# Patient Record
Sex: MALE | Race: WHITE | Employment: FULL TIME | URBAN - NONMETROPOLITAN AREA
[De-identification: names, ages, dates, MRNs, and addresses within clinical notes are randomized per-mention and may not be internally consistent; named-entity substitution may affect disease eponyms.]

---

## 2017-03-23 ENCOUNTER — APPOINTMENT (OUTPATIENT)
Dept: GENERAL RADIOLOGY | Age: 42
DRG: 190 | End: 2017-03-23
Payer: COMMERCIAL

## 2017-03-23 ENCOUNTER — HOSPITAL ENCOUNTER (INPATIENT)
Age: 42
LOS: 4 days | Discharge: HOME | DRG: 190 | End: 2017-03-27
Attending: SPECIALIST | Admitting: INTERNAL MEDICINE
Payer: COMMERCIAL

## 2017-03-23 ENCOUNTER — OFFICE VISIT (OUTPATIENT)
Dept: PRIMARY CARE CLINIC | Age: 42
End: 2017-03-23

## 2017-03-23 DIAGNOSIS — J44.1 ACUTE EXACERBATION OF CHRONIC OBSTRUCTIVE PULMONARY DISEASE (COPD) (HCC): Primary | ICD-10-CM

## 2017-03-23 DIAGNOSIS — R06.02 SHORTNESS OF BREATH: Primary | ICD-10-CM

## 2017-03-23 LAB
ABSOLUTE EOS #: 0.15 K/UL (ref 0–0.4)
ABSOLUTE LYMPH #: 1.53 K/UL (ref 1–4.8)
ABSOLUTE MONO #: 1.24 K/UL (ref 0.1–1.2)
ANION GAP SERPL CALCULATED.3IONS-SCNC: 9 MMOL/L (ref 9–17)
BASOPHILS # BLD: 1 % (ref 0–2)
BASOPHILS ABSOLUTE: 0.07 K/UL (ref 0–0.2)
BNP INTERPRETATION: NORMAL
BUN BLDV-MCNC: 12 MG/DL (ref 6–20)
BUN/CREAT BLD: 15 (ref 9–20)
CALCIUM SERPL-MCNC: 9 MG/DL (ref 8.6–10.4)
CHLORIDE BLD-SCNC: 100 MMOL/L (ref 98–107)
CO2: 34 MMOL/L (ref 20–31)
CREAT SERPL-MCNC: 0.81 MG/DL (ref 0.7–1.2)
D DIMER: 428 NG/ML
DIFFERENTIAL TYPE: ABNORMAL
EKG ATRIAL RATE: 105 BPM
EKG P AXIS: 34 DEGREES
EKG P-R INTERVAL: 166 MS
EKG Q-T INTERVAL: 344 MS
EKG QRS DURATION: 80 MS
EKG QTC CALCULATION (BAZETT): 454 MS
EKG R AXIS: -4 DEGREES
EKG T AXIS: 17 DEGREES
EKG VENTRICULAR RATE: 105 BPM
EOSINOPHILS RELATIVE PERCENT: 2 % (ref 1–4)
GFR AFRICAN AMERICAN: >60 ML/MIN
GFR NON-AFRICAN AMERICAN: >60 ML/MIN
GFR SERPL CREATININE-BSD FRML MDRD: ABNORMAL ML/MIN/{1.73_M2}
GFR SERPL CREATININE-BSD FRML MDRD: ABNORMAL ML/MIN/{1.73_M2}
GLUCOSE BLD-MCNC: 127 MG/DL (ref 70–99)
GLUCOSE BLD-MCNC: 243 MG/DL (ref 75–110)
HCT VFR BLD CALC: 47.3 % (ref 41–53)
HEMOGLOBIN: 14.9 G/DL (ref 13.5–17.5)
LYMPHOCYTES # BLD: 21 % (ref 24–44)
MAGNESIUM: 1.9 MG/DL (ref 1.6–2.6)
MCH RBC QN AUTO: 29.8 PG (ref 26–34)
MCHC RBC AUTO-ENTMCNC: 31.6 G/DL (ref 31–37)
MCV RBC AUTO: 94.5 FL (ref 80–100)
MONOCYTES # BLD: 17 % (ref 1–7)
MORPHOLOGY: ABNORMAL
PDW BLD-RTO: 14.9 % (ref 11–14.5)
PLATELET # BLD: 236 K/UL (ref 140–450)
PLATELET ESTIMATE: ABNORMAL
PMV BLD AUTO: 9.3 FL (ref 6–12)
POTASSIUM SERPL-SCNC: 4.2 MMOL/L (ref 3.7–5.3)
PRO-BNP: 56 PG/ML
RBC # BLD: 5 M/UL (ref 4.5–5.9)
RBC # BLD: ABNORMAL 10*6/UL
SEG NEUTROPHILS: 59 % (ref 36–66)
SEGMENTED NEUTROPHILS ABSOLUTE COUNT: 4.31 K/UL (ref 1.8–7.7)
SODIUM BLD-SCNC: 143 MMOL/L (ref 135–144)
TROPONIN INTERP: NORMAL
TROPONIN T: <0.03 NG/ML
WBC # BLD: 7.3 K/UL (ref 3.5–11)
WBC # BLD: ABNORMAL 10*3/UL

## 2017-03-23 PROCEDURE — 85025 COMPLETE CBC W/AUTO DIFF WBC: CPT

## 2017-03-23 PROCEDURE — 6370000000 HC RX 637 (ALT 250 FOR IP): Performed by: NURSE PRACTITIONER

## 2017-03-23 PROCEDURE — 71020 XR CHEST STANDARD TWO VW: CPT

## 2017-03-23 PROCEDURE — 99999 PR OFFICE/OUTPT VISIT,PROCEDURE ONLY: CPT | Performed by: PHYSICIAN ASSISTANT

## 2017-03-23 PROCEDURE — 6360000002 HC RX W HCPCS: Performed by: SPECIALIST

## 2017-03-23 PROCEDURE — 94761 N-INVAS EAR/PLS OXIMETRY MLT: CPT

## 2017-03-23 PROCEDURE — 6360000002 HC RX W HCPCS: Performed by: NURSE PRACTITIONER

## 2017-03-23 PROCEDURE — 36415 COLL VENOUS BLD VENIPUNCTURE: CPT

## 2017-03-23 PROCEDURE — 94640 AIRWAY INHALATION TREATMENT: CPT

## 2017-03-23 PROCEDURE — 84484 ASSAY OF TROPONIN QUANT: CPT

## 2017-03-23 PROCEDURE — 83735 ASSAY OF MAGNESIUM: CPT

## 2017-03-23 PROCEDURE — 6360000002 HC RX W HCPCS: Performed by: INTERNAL MEDICINE

## 2017-03-23 PROCEDURE — 96374 THER/PROPH/DIAG INJ IV PUSH: CPT

## 2017-03-23 PROCEDURE — 71020 XR CHEST STANDARD TWO VW: CPT | Performed by: RADIOLOGY

## 2017-03-23 PROCEDURE — 2580000003 HC RX 258: Performed by: INTERNAL MEDICINE

## 2017-03-23 PROCEDURE — 80048 BASIC METABOLIC PNL TOTAL CA: CPT

## 2017-03-23 PROCEDURE — 94150 VITAL CAPACITY TEST: CPT

## 2017-03-23 PROCEDURE — 83880 ASSAY OF NATRIURETIC PEPTIDE: CPT

## 2017-03-23 PROCEDURE — 2060000000 HC ICU INTERMEDIATE R&B

## 2017-03-23 PROCEDURE — 82947 ASSAY GLUCOSE BLOOD QUANT: CPT

## 2017-03-23 PROCEDURE — 93005 ELECTROCARDIOGRAM TRACING: CPT

## 2017-03-23 PROCEDURE — 85379 FIBRIN DEGRADATION QUANT: CPT

## 2017-03-23 PROCEDURE — 2500000003 HC RX 250 WO HCPCS: Performed by: NURSE PRACTITIONER

## 2017-03-23 PROCEDURE — 6370000000 HC RX 637 (ALT 250 FOR IP): Performed by: SPECIALIST

## 2017-03-23 PROCEDURE — 99285 EMERGENCY DEPT VISIT HI MDM: CPT

## 2017-03-23 RX ORDER — DEXTROSE MONOHYDRATE 25 G/50ML
12.5 INJECTION, SOLUTION INTRAVENOUS PRN
Status: DISCONTINUED | OUTPATIENT
Start: 2017-03-23 | End: 2017-03-27 | Stop reason: HOSPADM

## 2017-03-23 RX ORDER — DEXTROSE MONOHYDRATE 50 MG/ML
100 INJECTION, SOLUTION INTRAVENOUS PRN
Status: DISCONTINUED | OUTPATIENT
Start: 2017-03-23 | End: 2017-03-27 | Stop reason: HOSPADM

## 2017-03-23 RX ORDER — CEPHALEXIN 500 MG/1
500 CAPSULE ORAL 2 TIMES DAILY
Status: ON HOLD | COMMUNITY
End: 2017-03-27 | Stop reason: HOSPADM

## 2017-03-23 RX ORDER — ALBUTEROL SULFATE 90 UG/1
2 AEROSOL, METERED RESPIRATORY (INHALATION) EVERY 6 HOURS PRN
Status: DISCONTINUED | OUTPATIENT
Start: 2017-03-23 | End: 2017-03-23

## 2017-03-23 RX ORDER — IPRATROPIUM BROMIDE AND ALBUTEROL SULFATE 2.5; .5 MG/3ML; MG/3ML
1 SOLUTION RESPIRATORY (INHALATION)
Status: DISCONTINUED | OUTPATIENT
Start: 2017-03-23 | End: 2017-03-23

## 2017-03-23 RX ORDER — METHYLPREDNISOLONE SODIUM SUCCINATE 125 MG/2ML
125 INJECTION, POWDER, LYOPHILIZED, FOR SOLUTION INTRAMUSCULAR; INTRAVENOUS EVERY 6 HOURS
Status: DISCONTINUED | OUTPATIENT
Start: 2017-03-23 | End: 2017-03-25

## 2017-03-23 RX ORDER — PANTOPRAZOLE SODIUM 40 MG/1
40 TABLET, DELAYED RELEASE ORAL DAILY
Status: DISCONTINUED | OUTPATIENT
Start: 2017-03-24 | End: 2017-03-27 | Stop reason: HOSPADM

## 2017-03-23 RX ORDER — NICOTINE POLACRILEX 4 MG
15 LOZENGE BUCCAL PRN
Status: DISCONTINUED | OUTPATIENT
Start: 2017-03-23 | End: 2017-03-27 | Stop reason: HOSPADM

## 2017-03-23 RX ORDER — FLUTICASONE PROPIONATE 50 MCG
1 SPRAY, SUSPENSION (ML) NASAL DAILY
Status: DISCONTINUED | OUTPATIENT
Start: 2017-03-24 | End: 2017-03-27 | Stop reason: HOSPADM

## 2017-03-23 RX ORDER — ASPIRIN 81 MG/1
81 TABLET ORAL DAILY
Status: DISCONTINUED | OUTPATIENT
Start: 2017-03-24 | End: 2017-03-27 | Stop reason: HOSPADM

## 2017-03-23 RX ORDER — CEPHALEXIN 250 MG/1
500 CAPSULE ORAL 2 TIMES DAILY
Status: DISCONTINUED | OUTPATIENT
Start: 2017-03-23 | End: 2017-03-26

## 2017-03-23 RX ORDER — SODIUM CHLORIDE FOR INHALATION 0.9 %
3 VIAL, NEBULIZER (ML) INHALATION
Status: DISCONTINUED | OUTPATIENT
Start: 2017-03-23 | End: 2017-03-23

## 2017-03-23 RX ORDER — ONDANSETRON 2 MG/ML
4 INJECTION INTRAMUSCULAR; INTRAVENOUS EVERY 6 HOURS PRN
Status: DISCONTINUED | OUTPATIENT
Start: 2017-03-23 | End: 2017-03-27 | Stop reason: HOSPADM

## 2017-03-23 RX ORDER — IPRATROPIUM BROMIDE AND ALBUTEROL SULFATE 2.5; .5 MG/3ML; MG/3ML
1 SOLUTION RESPIRATORY (INHALATION) EVERY 4 HOURS
Status: DISCONTINUED | OUTPATIENT
Start: 2017-03-23 | End: 2017-03-27 | Stop reason: HOSPADM

## 2017-03-23 RX ORDER — ALBUTEROL SULFATE 2.5 MG/3ML
2.5 SOLUTION RESPIRATORY (INHALATION)
Status: DISCONTINUED | OUTPATIENT
Start: 2017-03-23 | End: 2017-03-27 | Stop reason: HOSPADM

## 2017-03-23 RX ORDER — METHYLPREDNISOLONE SODIUM SUCCINATE 125 MG/2ML
125 INJECTION, POWDER, LYOPHILIZED, FOR SOLUTION INTRAMUSCULAR; INTRAVENOUS ONCE
Status: COMPLETED | OUTPATIENT
Start: 2017-03-23 | End: 2017-03-23

## 2017-03-23 RX ORDER — SODIUM CHLORIDE 0.9 % (FLUSH) 0.9 %
10 SYRINGE (ML) INJECTION EVERY 12 HOURS SCHEDULED
Status: DISCONTINUED | OUTPATIENT
Start: 2017-03-23 | End: 2017-03-27 | Stop reason: HOSPADM

## 2017-03-23 RX ORDER — DEXTROSE MONOHYDRATE 50 MG/ML
INJECTION, SOLUTION INTRAVENOUS
Status: DISPENSED
Start: 2017-03-23 | End: 2017-03-24

## 2017-03-23 RX ORDER — ACETAMINOPHEN 325 MG/1
650 TABLET ORAL EVERY 4 HOURS PRN
Status: DISCONTINUED | OUTPATIENT
Start: 2017-03-23 | End: 2017-03-27 | Stop reason: HOSPADM

## 2017-03-23 RX ORDER — LISINOPRIL 5 MG/1
5 TABLET ORAL DAILY
Status: DISCONTINUED | OUTPATIENT
Start: 2017-03-24 | End: 2017-03-27 | Stop reason: HOSPADM

## 2017-03-23 RX ORDER — SODIUM CHLORIDE 0.9 % (FLUSH) 0.9 %
10 SYRINGE (ML) INJECTION PRN
Status: DISCONTINUED | OUTPATIENT
Start: 2017-03-23 | End: 2017-03-27 | Stop reason: HOSPADM

## 2017-03-23 RX ADMIN — AZITHROMYCIN MONOHYDRATE 500 MG: 500 INJECTION, POWDER, LYOPHILIZED, FOR SOLUTION INTRAVENOUS at 19:23

## 2017-03-23 RX ADMIN — INSULIN LISPRO 1 UNITS: 100 INJECTION, SOLUTION INTRAVENOUS; SUBCUTANEOUS at 21:19

## 2017-03-23 RX ADMIN — METHYLPREDNISOLONE SODIUM SUCCINATE 125 MG: 125 INJECTION, POWDER, FOR SOLUTION INTRAMUSCULAR; INTRAVENOUS at 15:52

## 2017-03-23 RX ADMIN — IPRATROPIUM BROMIDE AND ALBUTEROL SULFATE 1 AMPULE: .5; 3 SOLUTION RESPIRATORY (INHALATION) at 16:51

## 2017-03-23 RX ADMIN — CEFTRIAXONE 1 G: 1 INJECTION, POWDER, FOR SOLUTION INTRAMUSCULAR; INTRAVENOUS at 18:55

## 2017-03-23 RX ADMIN — METOPROLOL TARTRATE 25 MG: 25 TABLET ORAL at 21:19

## 2017-03-23 RX ADMIN — IPRATROPIUM BROMIDE AND ALBUTEROL SULFATE 1 AMPULE: .5; 3 SOLUTION RESPIRATORY (INHALATION) at 20:02

## 2017-03-23 RX ADMIN — METHYLPREDNISOLONE SODIUM SUCCINATE 125 MG: 125 INJECTION, POWDER, FOR SOLUTION INTRAMUSCULAR; INTRAVENOUS at 21:19

## 2017-03-23 RX ADMIN — CEPHALEXIN 500 MG: 250 CAPSULE ORAL at 21:19

## 2017-03-23 RX ADMIN — METOPROLOL TARTRATE 5 MG: 5 INJECTION INTRAVENOUS at 21:44

## 2017-03-23 RX ADMIN — IPRATROPIUM BROMIDE AND ALBUTEROL SULFATE 1 AMPULE: .5; 3 SOLUTION RESPIRATORY (INHALATION) at 16:26

## 2017-03-23 ASSESSMENT — PAIN SCALES - GENERAL: PAINLEVEL_OUTOF10: 0

## 2017-03-23 ASSESSMENT — ENCOUNTER SYMPTOMS
WHEEZING: 1
SHORTNESS OF BREATH: 1
COUGH: 1

## 2017-03-24 ENCOUNTER — APPOINTMENT (OUTPATIENT)
Dept: GENERAL RADIOLOGY | Age: 42
DRG: 190 | End: 2017-03-24
Payer: COMMERCIAL

## 2017-03-24 ENCOUNTER — APPOINTMENT (OUTPATIENT)
Dept: CT IMAGING | Age: 42
DRG: 190 | End: 2017-03-24
Payer: COMMERCIAL

## 2017-03-24 LAB
ABSOLUTE EOS #: 0 K/UL (ref 0–0.4)
ABSOLUTE LYMPH #: 0.7 K/UL (ref 1–4.8)
ABSOLUTE MONO #: 0.2 K/UL (ref 0.1–1.2)
ALBUMIN SERPL-MCNC: 4.3 G/DL (ref 3.5–5.2)
ALBUMIN/GLOBULIN RATIO: 1.2 (ref 1–2.5)
ALP BLD-CCNC: 65 U/L (ref 40–129)
ALT SERPL-CCNC: 40 U/L (ref 5–41)
ANION GAP SERPL CALCULATED.3IONS-SCNC: 14 MMOL/L (ref 9–17)
AST SERPL-CCNC: 29 U/L
BASOPHILS # BLD: 0 % (ref 0–2)
BASOPHILS ABSOLUTE: 0 K/UL (ref 0–0.2)
BILIRUB SERPL-MCNC: 0.2 MG/DL (ref 0.3–1.2)
BNP INTERPRETATION: NORMAL
BUN BLDV-MCNC: 16 MG/DL (ref 6–20)
BUN/CREAT BLD: 19 (ref 9–20)
CALCIUM SERPL-MCNC: 9.1 MG/DL (ref 8.6–10.4)
CHLORIDE BLD-SCNC: 96 MMOL/L (ref 98–107)
CHOLESTEROL/HDL RATIO: 4.9
CHOLESTEROL: 194 MG/DL
CO2: 30 MMOL/L (ref 20–31)
CREAT SERPL-MCNC: 0.85 MG/DL (ref 0.7–1.2)
DIFFERENTIAL TYPE: ABNORMAL
EOSINOPHILS RELATIVE PERCENT: 0 % (ref 1–4)
GFR AFRICAN AMERICAN: >60 ML/MIN
GFR NON-AFRICAN AMERICAN: >60 ML/MIN
GFR SERPL CREATININE-BSD FRML MDRD: ABNORMAL ML/MIN/{1.73_M2}
GFR SERPL CREATININE-BSD FRML MDRD: ABNORMAL ML/MIN/{1.73_M2}
GLUCOSE BLD-MCNC: 201 MG/DL (ref 75–110)
GLUCOSE BLD-MCNC: 215 MG/DL (ref 75–110)
GLUCOSE BLD-MCNC: 234 MG/DL (ref 75–110)
GLUCOSE BLD-MCNC: 248 MG/DL (ref 70–99)
HCT VFR BLD CALC: 48.3 % (ref 41–53)
HDLC SERPL-MCNC: 40 MG/DL
HEMOGLOBIN: 15.3 G/DL (ref 13.5–17.5)
LDL CHOLESTEROL: 139 MG/DL (ref 0–130)
LYMPHOCYTES # BLD: 11 % (ref 24–44)
MCH RBC QN AUTO: 29.7 PG (ref 26–34)
MCHC RBC AUTO-ENTMCNC: 31.6 G/DL (ref 31–37)
MCV RBC AUTO: 94.1 FL (ref 80–100)
MONOCYTES # BLD: 3 % (ref 1–7)
PDW BLD-RTO: 14.9 % (ref 11–14.5)
PLATELET # BLD: 248 K/UL (ref 140–450)
PLATELET ESTIMATE: ABNORMAL
PMV BLD AUTO: 9.9 FL (ref 6–12)
POTASSIUM SERPL-SCNC: 4.2 MMOL/L (ref 3.7–5.3)
PRO-BNP: 110 PG/ML
RBC # BLD: 5.13 M/UL (ref 4.5–5.9)
RBC # BLD: ABNORMAL 10*6/UL
SEG NEUTROPHILS: 86 % (ref 36–66)
SEGMENTED NEUTROPHILS ABSOLUTE COUNT: 5.8 K/UL (ref 1.8–7.7)
SODIUM BLD-SCNC: 140 MMOL/L (ref 135–144)
TOTAL PROTEIN: 7.9 G/DL (ref 6.4–8.3)
TRIGL SERPL-MCNC: 74 MG/DL
VLDLC SERPL CALC-MCNC: 15 MG/DL (ref 1–30)
WBC # BLD: 6.7 K/UL (ref 3.5–11)
WBC # BLD: ABNORMAL 10*3/UL

## 2017-03-24 PROCEDURE — 2060000000 HC ICU INTERMEDIATE R&B

## 2017-03-24 PROCEDURE — 80061 LIPID PANEL: CPT

## 2017-03-24 PROCEDURE — 82947 ASSAY GLUCOSE BLOOD QUANT: CPT

## 2017-03-24 PROCEDURE — 80053 COMPREHEN METABOLIC PANEL: CPT

## 2017-03-24 PROCEDURE — 2580000003 HC RX 258: Performed by: INTERNAL MEDICINE

## 2017-03-24 PROCEDURE — 97116 GAIT TRAINING THERAPY: CPT | Performed by: PHYSICAL THERAPIST

## 2017-03-24 PROCEDURE — 6360000002 HC RX W HCPCS: Performed by: INTERNAL MEDICINE

## 2017-03-24 PROCEDURE — 94761 N-INVAS EAR/PLS OXIMETRY MLT: CPT

## 2017-03-24 PROCEDURE — 6370000000 HC RX 637 (ALT 250 FOR IP): Performed by: INTERNAL MEDICINE

## 2017-03-24 PROCEDURE — 36415 COLL VENOUS BLD VENIPUNCTURE: CPT

## 2017-03-24 PROCEDURE — G8978 MOBILITY CURRENT STATUS: HCPCS | Performed by: PHYSICAL THERAPIST

## 2017-03-24 PROCEDURE — 6370000000 HC RX 637 (ALT 250 FOR IP): Performed by: NURSE PRACTITIONER

## 2017-03-24 PROCEDURE — 71275 CT ANGIOGRAPHY CHEST: CPT | Performed by: RADIOLOGY

## 2017-03-24 PROCEDURE — G8979 MOBILITY GOAL STATUS: HCPCS | Performed by: PHYSICAL THERAPIST

## 2017-03-24 PROCEDURE — 6360000004 HC RX CONTRAST MEDICATION: Performed by: INTERNAL MEDICINE

## 2017-03-24 PROCEDURE — 6360000002 HC RX W HCPCS: Performed by: NURSE PRACTITIONER

## 2017-03-24 PROCEDURE — 71010 XR CHEST PORTABLE: CPT

## 2017-03-24 PROCEDURE — 97162 PT EVAL MOD COMPLEX 30 MIN: CPT | Performed by: PHYSICAL THERAPIST

## 2017-03-24 PROCEDURE — G8980 MOBILITY D/C STATUS: HCPCS | Performed by: PHYSICAL THERAPIST

## 2017-03-24 PROCEDURE — 71275 CT ANGIOGRAPHY CHEST: CPT

## 2017-03-24 PROCEDURE — 83880 ASSAY OF NATRIURETIC PEPTIDE: CPT

## 2017-03-24 PROCEDURE — 99232 SBSQ HOSP IP/OBS MODERATE 35: CPT | Performed by: INTERNAL MEDICINE

## 2017-03-24 PROCEDURE — 71010 XR CHEST PORTABLE: CPT | Performed by: RADIOLOGY

## 2017-03-24 PROCEDURE — 85025 COMPLETE CBC W/AUTO DIFF WBC: CPT

## 2017-03-24 PROCEDURE — 94640 AIRWAY INHALATION TREATMENT: CPT

## 2017-03-24 RX ORDER — FUROSEMIDE 10 MG/ML
40 INJECTION INTRAMUSCULAR; INTRAVENOUS ONCE
Status: COMPLETED | OUTPATIENT
Start: 2017-03-24 | End: 2017-03-24

## 2017-03-24 RX ORDER — SIMVASTATIN 20 MG
20 TABLET ORAL NIGHTLY
Status: DISCONTINUED | OUTPATIENT
Start: 2017-03-24 | End: 2017-03-27 | Stop reason: HOSPADM

## 2017-03-24 RX ORDER — FUROSEMIDE 40 MG/1
40 TABLET ORAL DAILY
Status: DISCONTINUED | OUTPATIENT
Start: 2017-03-25 | End: 2017-03-27 | Stop reason: HOSPADM

## 2017-03-24 RX ORDER — INSULIN GLARGINE 100 [IU]/ML
30 INJECTION, SOLUTION SUBCUTANEOUS DAILY
Status: DISCONTINUED | OUTPATIENT
Start: 2017-03-24 | End: 2017-03-25

## 2017-03-24 RX ORDER — ENALAPRILAT 2.5 MG/2ML
1.25 INJECTION INTRAVENOUS EVERY 6 HOURS PRN
Status: DISCONTINUED | OUTPATIENT
Start: 2017-03-24 | End: 2017-03-27 | Stop reason: HOSPADM

## 2017-03-24 RX ADMIN — IPRATROPIUM BROMIDE AND ALBUTEROL SULFATE 1 AMPULE: .5; 3 SOLUTION RESPIRATORY (INHALATION) at 19:57

## 2017-03-24 RX ADMIN — FUROSEMIDE 40 MG: 10 INJECTION, SOLUTION INTRAMUSCULAR; INTRAVENOUS at 03:03

## 2017-03-24 RX ADMIN — METHYLPREDNISOLONE SODIUM SUCCINATE 125 MG: 125 INJECTION, POWDER, FOR SOLUTION INTRAMUSCULAR; INTRAVENOUS at 21:16

## 2017-03-24 RX ADMIN — Medication 10 ML: at 08:39

## 2017-03-24 RX ADMIN — IPRATROPIUM BROMIDE AND ALBUTEROL SULFATE 1 AMPULE: .5; 3 SOLUTION RESPIRATORY (INHALATION) at 00:16

## 2017-03-24 RX ADMIN — SERTRALINE HYDROCHLORIDE 25 MG: 50 TABLET, FILM COATED ORAL at 21:15

## 2017-03-24 RX ADMIN — IOVERSOL 100 ML: 741 INJECTION INTRA-ARTERIAL; INTRAVENOUS at 14:09

## 2017-03-24 RX ADMIN — METHYLPREDNISOLONE SODIUM SUCCINATE 125 MG: 125 INJECTION, POWDER, FOR SOLUTION INTRAMUSCULAR; INTRAVENOUS at 02:33

## 2017-03-24 RX ADMIN — INSULIN LISPRO 2 UNITS: 100 INJECTION, SOLUTION INTRAVENOUS; SUBCUTANEOUS at 17:04

## 2017-03-24 RX ADMIN — INSULIN LISPRO 2 UNITS: 100 INJECTION, SOLUTION INTRAVENOUS; SUBCUTANEOUS at 12:20

## 2017-03-24 RX ADMIN — Medication 10 ML: at 14:59

## 2017-03-24 RX ADMIN — Medication 10 ML: at 14:31

## 2017-03-24 RX ADMIN — METOPROLOL TARTRATE 25 MG: 25 TABLET ORAL at 21:15

## 2017-03-24 RX ADMIN — AZITHROMYCIN MONOHYDRATE 500 MG: 500 INJECTION, POWDER, LYOPHILIZED, FOR SOLUTION INTRAVENOUS at 18:39

## 2017-03-24 RX ADMIN — IPRATROPIUM BROMIDE AND ALBUTEROL SULFATE 1 AMPULE: .5; 3 SOLUTION RESPIRATORY (INHALATION) at 12:28

## 2017-03-24 RX ADMIN — IPRATROPIUM BROMIDE AND ALBUTEROL SULFATE 1 AMPULE: .5; 3 SOLUTION RESPIRATORY (INHALATION) at 08:50

## 2017-03-24 RX ADMIN — CEPHALEXIN 500 MG: 250 CAPSULE ORAL at 08:38

## 2017-03-24 RX ADMIN — INSULIN GLARGINE 30 UNITS: 100 INJECTION, SOLUTION SUBCUTANEOUS at 12:19

## 2017-03-24 RX ADMIN — ASPIRIN 81 MG: 81 TABLET, COATED ORAL at 08:38

## 2017-03-24 RX ADMIN — IPRATROPIUM BROMIDE AND ALBUTEROL SULFATE 1 AMPULE: .5; 3 SOLUTION RESPIRATORY (INHALATION) at 03:47

## 2017-03-24 RX ADMIN — INSULIN LISPRO 10 UNITS: 100 INJECTION, SOLUTION INTRAVENOUS; SUBCUTANEOUS at 12:19

## 2017-03-24 RX ADMIN — IPRATROPIUM BROMIDE AND ALBUTEROL SULFATE 1 AMPULE: .5; 3 SOLUTION RESPIRATORY (INHALATION) at 23:39

## 2017-03-24 RX ADMIN — PANTOPRAZOLE SODIUM 40 MG: 40 TABLET, DELAYED RELEASE ORAL at 08:38

## 2017-03-24 RX ADMIN — SIMVASTATIN 20 MG: 20 TABLET, FILM COATED ORAL at 21:15

## 2017-03-24 RX ADMIN — CEPHALEXIN 500 MG: 250 CAPSULE ORAL at 21:15

## 2017-03-24 RX ADMIN — METHYLPREDNISOLONE SODIUM SUCCINATE 125 MG: 125 INJECTION, POWDER, FOR SOLUTION INTRAMUSCULAR; INTRAVENOUS at 14:59

## 2017-03-24 RX ADMIN — INSULIN LISPRO 2 UNITS: 100 INJECTION, SOLUTION INTRAVENOUS; SUBCUTANEOUS at 08:39

## 2017-03-24 RX ADMIN — IPRATROPIUM BROMIDE AND ALBUTEROL SULFATE 1 AMPULE: .5; 3 SOLUTION RESPIRATORY (INHALATION) at 16:20

## 2017-03-24 RX ADMIN — FUROSEMIDE 40 MG: 10 INJECTION, SOLUTION INTRAMUSCULAR; INTRAVENOUS at 14:31

## 2017-03-24 RX ADMIN — METOPROLOL TARTRATE 25 MG: 25 TABLET ORAL at 08:44

## 2017-03-24 RX ADMIN — INSULIN LISPRO 1 UNITS: 100 INJECTION, SOLUTION INTRAVENOUS; SUBCUTANEOUS at 21:16

## 2017-03-24 RX ADMIN — Medication 10 ML: at 02:33

## 2017-03-24 RX ADMIN — METHYLPREDNISOLONE SODIUM SUCCINATE 125 MG: 125 INJECTION, POWDER, FOR SOLUTION INTRAMUSCULAR; INTRAVENOUS at 08:39

## 2017-03-24 RX ADMIN — CEFTRIAXONE 1 G: 1 INJECTION, POWDER, FOR SOLUTION INTRAMUSCULAR; INTRAVENOUS at 19:53

## 2017-03-24 RX ADMIN — FLUTICASONE PROPIONATE 1 SPRAY: 50 SPRAY, METERED NASAL at 08:41

## 2017-03-24 RX ADMIN — LISINOPRIL 5 MG: 5 TABLET ORAL at 08:38

## 2017-03-24 RX ADMIN — INSULIN LISPRO 10 UNITS: 100 INJECTION, SOLUTION INTRAVENOUS; SUBCUTANEOUS at 17:05

## 2017-03-24 ASSESSMENT — PAIN SCALES - GENERAL: PAINLEVEL_OUTOF10: 0

## 2017-03-25 ENCOUNTER — APPOINTMENT (OUTPATIENT)
Dept: GENERAL RADIOLOGY | Age: 42
DRG: 190 | End: 2017-03-25
Payer: COMMERCIAL

## 2017-03-25 LAB
ABSOLUTE EOS #: 0 K/UL (ref 0–0.4)
ABSOLUTE LYMPH #: 1.4 K/UL (ref 1–4.8)
ABSOLUTE MONO #: 1.2 K/UL (ref 0.1–1.2)
ANION GAP SERPL CALCULATED.3IONS-SCNC: 12 MMOL/L (ref 9–17)
BASOPHILS # BLD: 0 % (ref 0–2)
BASOPHILS ABSOLUTE: 0 K/UL (ref 0–0.2)
BUN BLDV-MCNC: 30 MG/DL (ref 6–20)
BUN/CREAT BLD: 35 (ref 9–20)
CALCIUM SERPL-MCNC: 9 MG/DL (ref 8.6–10.4)
CHLORIDE BLD-SCNC: 95 MMOL/L (ref 98–107)
CO2: 31 MMOL/L (ref 20–31)
CREAT SERPL-MCNC: 0.85 MG/DL (ref 0.7–1.2)
DIFFERENTIAL TYPE: ABNORMAL
EOSINOPHILS RELATIVE PERCENT: 0 % (ref 1–4)
GFR AFRICAN AMERICAN: >60 ML/MIN
GFR NON-AFRICAN AMERICAN: >60 ML/MIN
GFR SERPL CREATININE-BSD FRML MDRD: ABNORMAL ML/MIN/{1.73_M2}
GFR SERPL CREATININE-BSD FRML MDRD: ABNORMAL ML/MIN/{1.73_M2}
GLUCOSE BLD-MCNC: 167 MG/DL (ref 75–110)
GLUCOSE BLD-MCNC: 228 MG/DL (ref 70–99)
GLUCOSE BLD-MCNC: 240 MG/DL (ref 75–110)
GLUCOSE BLD-MCNC: 297 MG/DL (ref 75–110)
GLUCOSE BLD-MCNC: 306 MG/DL (ref 75–110)
HCT VFR BLD CALC: 46.3 % (ref 41–53)
HEMOGLOBIN: 14.6 G/DL (ref 13.5–17.5)
LYMPHOCYTES # BLD: 8 % (ref 24–44)
MCH RBC QN AUTO: 29.6 PG (ref 26–34)
MCHC RBC AUTO-ENTMCNC: 31.4 G/DL (ref 31–37)
MCV RBC AUTO: 94.3 FL (ref 80–100)
MONOCYTES # BLD: 7 % (ref 1–7)
PDW BLD-RTO: 14.9 % (ref 11–14.5)
PLATELET # BLD: 279 K/UL (ref 140–450)
PLATELET ESTIMATE: ABNORMAL
PMV BLD AUTO: 9.8 FL (ref 6–12)
POTASSIUM SERPL-SCNC: 4.4 MMOL/L (ref 3.7–5.3)
RBC # BLD: 4.92 M/UL (ref 4.5–5.9)
RBC # BLD: ABNORMAL 10*6/UL
SEG NEUTROPHILS: 85 % (ref 36–66)
SEGMENTED NEUTROPHILS ABSOLUTE COUNT: 15 K/UL (ref 1.8–7.7)
SODIUM BLD-SCNC: 138 MMOL/L (ref 135–144)
WBC # BLD: 17.7 K/UL (ref 3.5–11)
WBC # BLD: ABNORMAL 10*3/UL

## 2017-03-25 PROCEDURE — 6370000000 HC RX 637 (ALT 250 FOR IP): Performed by: NURSE PRACTITIONER

## 2017-03-25 PROCEDURE — 6370000000 HC RX 637 (ALT 250 FOR IP): Performed by: INTERNAL MEDICINE

## 2017-03-25 PROCEDURE — 94640 AIRWAY INHALATION TREATMENT: CPT

## 2017-03-25 PROCEDURE — 80048 BASIC METABOLIC PNL TOTAL CA: CPT

## 2017-03-25 PROCEDURE — 6360000002 HC RX W HCPCS: Performed by: INTERNAL MEDICINE

## 2017-03-25 PROCEDURE — 94761 N-INVAS EAR/PLS OXIMETRY MLT: CPT

## 2017-03-25 PROCEDURE — 2580000003 HC RX 258: Performed by: INTERNAL MEDICINE

## 2017-03-25 PROCEDURE — 71020 XR CHEST STANDARD TWO VW: CPT

## 2017-03-25 PROCEDURE — 82947 ASSAY GLUCOSE BLOOD QUANT: CPT

## 2017-03-25 PROCEDURE — 36415 COLL VENOUS BLD VENIPUNCTURE: CPT

## 2017-03-25 PROCEDURE — 99232 SBSQ HOSP IP/OBS MODERATE 35: CPT | Performed by: FAMILY MEDICINE

## 2017-03-25 PROCEDURE — 2060000000 HC ICU INTERMEDIATE R&B

## 2017-03-25 PROCEDURE — 85025 COMPLETE CBC W/AUTO DIFF WBC: CPT

## 2017-03-25 PROCEDURE — 71020 XR CHEST STANDARD TWO VW: CPT | Performed by: RADIOLOGY

## 2017-03-25 PROCEDURE — 94150 VITAL CAPACITY TEST: CPT

## 2017-03-25 PROCEDURE — 6360000002 HC RX W HCPCS: Performed by: NURSE PRACTITIONER

## 2017-03-25 RX ORDER — INSULIN GLARGINE 100 [IU]/ML
35 INJECTION, SOLUTION SUBCUTANEOUS DAILY
Status: DISCONTINUED | OUTPATIENT
Start: 2017-03-26 | End: 2017-03-27 | Stop reason: HOSPADM

## 2017-03-25 RX ORDER — METHYLPREDNISOLONE SODIUM SUCCINATE 125 MG/2ML
80 INJECTION, POWDER, LYOPHILIZED, FOR SOLUTION INTRAMUSCULAR; INTRAVENOUS EVERY 8 HOURS
Status: DISCONTINUED | OUTPATIENT
Start: 2017-03-25 | End: 2017-03-26

## 2017-03-25 RX ADMIN — IPRATROPIUM BROMIDE AND ALBUTEROL SULFATE 1 AMPULE: .5; 3 SOLUTION RESPIRATORY (INHALATION) at 04:28

## 2017-03-25 RX ADMIN — INSULIN LISPRO 10 UNITS: 100 INJECTION, SOLUTION INTRAVENOUS; SUBCUTANEOUS at 11:43

## 2017-03-25 RX ADMIN — CEFTRIAXONE 1 G: 1 INJECTION, POWDER, FOR SOLUTION INTRAMUSCULAR; INTRAVENOUS at 19:44

## 2017-03-25 RX ADMIN — INSULIN LISPRO 3 UNITS: 100 INJECTION, SOLUTION INTRAVENOUS; SUBCUTANEOUS at 21:51

## 2017-03-25 RX ADMIN — INSULIN LISPRO 4 UNITS: 100 INJECTION, SOLUTION INTRAVENOUS; SUBCUTANEOUS at 09:11

## 2017-03-25 RX ADMIN — IPRATROPIUM BROMIDE AND ALBUTEROL SULFATE 1 AMPULE: .5; 3 SOLUTION RESPIRATORY (INHALATION) at 19:59

## 2017-03-25 RX ADMIN — SERTRALINE HYDROCHLORIDE 25 MG: 50 TABLET, FILM COATED ORAL at 21:50

## 2017-03-25 RX ADMIN — INSULIN LISPRO 1 UNITS: 100 INJECTION, SOLUTION INTRAVENOUS; SUBCUTANEOUS at 16:36

## 2017-03-25 RX ADMIN — SIMVASTATIN 20 MG: 20 TABLET, FILM COATED ORAL at 21:50

## 2017-03-25 RX ADMIN — PANTOPRAZOLE SODIUM 40 MG: 40 TABLET, DELAYED RELEASE ORAL at 09:14

## 2017-03-25 RX ADMIN — IPRATROPIUM BROMIDE AND ALBUTEROL SULFATE 1 AMPULE: .5; 3 SOLUTION RESPIRATORY (INHALATION) at 11:27

## 2017-03-25 RX ADMIN — METOPROLOL TARTRATE 25 MG: 25 TABLET ORAL at 21:50

## 2017-03-25 RX ADMIN — IPRATROPIUM BROMIDE AND ALBUTEROL SULFATE 1 AMPULE: .5; 3 SOLUTION RESPIRATORY (INHALATION) at 16:08

## 2017-03-25 RX ADMIN — INSULIN GLARGINE 30 UNITS: 100 INJECTION, SOLUTION SUBCUTANEOUS at 09:11

## 2017-03-25 RX ADMIN — INSULIN LISPRO 2 UNITS: 100 INJECTION, SOLUTION INTRAVENOUS; SUBCUTANEOUS at 11:43

## 2017-03-25 RX ADMIN — ACETAMINOPHEN 650 MG: 325 TABLET ORAL at 19:56

## 2017-03-25 RX ADMIN — INSULIN LISPRO 10 UNITS: 100 INJECTION, SOLUTION INTRAVENOUS; SUBCUTANEOUS at 16:35

## 2017-03-25 RX ADMIN — CEPHALEXIN 500 MG: 250 CAPSULE ORAL at 21:50

## 2017-03-25 RX ADMIN — CEPHALEXIN 500 MG: 250 CAPSULE ORAL at 09:13

## 2017-03-25 RX ADMIN — LISINOPRIL 5 MG: 5 TABLET ORAL at 09:14

## 2017-03-25 RX ADMIN — ASPIRIN 81 MG: 81 TABLET, COATED ORAL at 09:14

## 2017-03-25 RX ADMIN — IPRATROPIUM BROMIDE AND ALBUTEROL SULFATE 1 AMPULE: .5; 3 SOLUTION RESPIRATORY (INHALATION) at 07:46

## 2017-03-25 RX ADMIN — Medication 10 ML: at 09:14

## 2017-03-25 RX ADMIN — METHYLPREDNISOLONE SODIUM SUCCINATE 125 MG: 125 INJECTION, POWDER, FOR SOLUTION INTRAMUSCULAR; INTRAVENOUS at 14:38

## 2017-03-25 RX ADMIN — INSULIN LISPRO 10 UNITS: 100 INJECTION, SOLUTION INTRAVENOUS; SUBCUTANEOUS at 09:12

## 2017-03-25 RX ADMIN — METHYLPREDNISOLONE SODIUM SUCCINATE 125 MG: 125 INJECTION, POWDER, FOR SOLUTION INTRAMUSCULAR; INTRAVENOUS at 04:30

## 2017-03-25 RX ADMIN — METHYLPREDNISOLONE SODIUM SUCCINATE 125 MG: 125 INJECTION, POWDER, FOR SOLUTION INTRAMUSCULAR; INTRAVENOUS at 09:13

## 2017-03-25 RX ADMIN — FLUTICASONE PROPIONATE 1 SPRAY: 50 SPRAY, METERED NASAL at 09:16

## 2017-03-25 RX ADMIN — AZITHROMYCIN MONOHYDRATE 500 MG: 500 INJECTION, POWDER, LYOPHILIZED, FOR SOLUTION INTRAVENOUS at 18:33

## 2017-03-25 RX ADMIN — METOPROLOL TARTRATE 25 MG: 25 TABLET ORAL at 09:14

## 2017-03-25 RX ADMIN — Medication 10 ML: at 21:53

## 2017-03-25 ASSESSMENT — PAIN SCALES - GENERAL: PAINLEVEL_OUTOF10: 4

## 2017-03-26 LAB
ABSOLUTE EOS #: 0 K/UL (ref 0–0.4)
ABSOLUTE LYMPH #: 1.5 K/UL (ref 1–4.8)
ABSOLUTE MONO #: 1.5 K/UL (ref 0.1–1.2)
ANION GAP SERPL CALCULATED.3IONS-SCNC: 9 MMOL/L (ref 9–17)
BASOPHILS # BLD: 0 % (ref 0–2)
BASOPHILS ABSOLUTE: 0 K/UL (ref 0–0.2)
BUN BLDV-MCNC: 31 MG/DL (ref 6–20)
BUN/CREAT BLD: 36 (ref 9–20)
CALCIUM SERPL-MCNC: 9.1 MG/DL (ref 8.6–10.4)
CHLORIDE BLD-SCNC: 97 MMOL/L (ref 98–107)
CO2: 36 MMOL/L (ref 20–31)
CREAT SERPL-MCNC: 0.86 MG/DL (ref 0.7–1.2)
DIFFERENTIAL TYPE: ABNORMAL
EOSINOPHILS RELATIVE PERCENT: 0 % (ref 1–4)
GFR AFRICAN AMERICAN: >60 ML/MIN
GFR NON-AFRICAN AMERICAN: >60 ML/MIN
GFR SERPL CREATININE-BSD FRML MDRD: ABNORMAL ML/MIN/{1.73_M2}
GFR SERPL CREATININE-BSD FRML MDRD: ABNORMAL ML/MIN/{1.73_M2}
GLUCOSE BLD-MCNC: 163 MG/DL (ref 70–99)
GLUCOSE BLD-MCNC: 210 MG/DL (ref 75–110)
GLUCOSE BLD-MCNC: 278 MG/DL (ref 75–110)
GLUCOSE BLD-MCNC: 330 MG/DL (ref 75–110)
HCT VFR BLD CALC: 46.8 % (ref 41–53)
HEMOGLOBIN: 14.5 G/DL (ref 13.5–17.5)
LYMPHOCYTES # BLD: 9 % (ref 24–44)
MCH RBC QN AUTO: 29.3 PG (ref 26–34)
MCHC RBC AUTO-ENTMCNC: 31.1 G/DL (ref 31–37)
MCV RBC AUTO: 94.4 FL (ref 80–100)
MONOCYTES # BLD: 9 % (ref 1–7)
PDW BLD-RTO: 15.1 % (ref 11–14.5)
PLATELET # BLD: 303 K/UL (ref 140–450)
PLATELET ESTIMATE: ABNORMAL
PMV BLD AUTO: 9.1 FL (ref 6–12)
POTASSIUM SERPL-SCNC: 5 MMOL/L (ref 3.7–5.3)
RBC # BLD: 4.95 M/UL (ref 4.5–5.9)
RBC # BLD: ABNORMAL 10*6/UL
SEG NEUTROPHILS: 82 % (ref 36–66)
SEGMENTED NEUTROPHILS ABSOLUTE COUNT: 14.3 K/UL (ref 1.8–7.7)
SODIUM BLD-SCNC: 142 MMOL/L (ref 135–144)
WBC # BLD: 17.4 K/UL (ref 3.5–11)
WBC # BLD: ABNORMAL 10*3/UL

## 2017-03-26 PROCEDURE — 94761 N-INVAS EAR/PLS OXIMETRY MLT: CPT

## 2017-03-26 PROCEDURE — 2580000003 HC RX 258: Performed by: INTERNAL MEDICINE

## 2017-03-26 PROCEDURE — 6370000000 HC RX 637 (ALT 250 FOR IP): Performed by: INTERNAL MEDICINE

## 2017-03-26 PROCEDURE — 85025 COMPLETE CBC W/AUTO DIFF WBC: CPT

## 2017-03-26 PROCEDURE — 80048 BASIC METABOLIC PNL TOTAL CA: CPT

## 2017-03-26 PROCEDURE — 94150 VITAL CAPACITY TEST: CPT

## 2017-03-26 PROCEDURE — 6360000002 HC RX W HCPCS: Performed by: INTERNAL MEDICINE

## 2017-03-26 PROCEDURE — 6360000002 HC RX W HCPCS: Performed by: FAMILY MEDICINE

## 2017-03-26 PROCEDURE — 2060000000 HC ICU INTERMEDIATE R&B

## 2017-03-26 PROCEDURE — 36415 COLL VENOUS BLD VENIPUNCTURE: CPT

## 2017-03-26 PROCEDURE — 99232 SBSQ HOSP IP/OBS MODERATE 35: CPT | Performed by: FAMILY MEDICINE

## 2017-03-26 PROCEDURE — 94640 AIRWAY INHALATION TREATMENT: CPT

## 2017-03-26 PROCEDURE — 6370000000 HC RX 637 (ALT 250 FOR IP): Performed by: NURSE PRACTITIONER

## 2017-03-26 PROCEDURE — 82947 ASSAY GLUCOSE BLOOD QUANT: CPT

## 2017-03-26 RX ORDER — METHYLPREDNISOLONE SODIUM SUCCINATE 40 MG/ML
40 INJECTION, POWDER, LYOPHILIZED, FOR SOLUTION INTRAMUSCULAR; INTRAVENOUS EVERY 8 HOURS
Status: DISCONTINUED | OUTPATIENT
Start: 2017-03-26 | End: 2017-03-27 | Stop reason: HOSPADM

## 2017-03-26 RX ORDER — IBUPROFEN 600 MG/1
600 TABLET ORAL 3 TIMES DAILY PRN
Status: DISCONTINUED | OUTPATIENT
Start: 2017-03-26 | End: 2017-03-27 | Stop reason: HOSPADM

## 2017-03-26 RX ADMIN — METHYLPREDNISOLONE SODIUM SUCCINATE 80 MG: 125 INJECTION, POWDER, FOR SOLUTION INTRAMUSCULAR; INTRAVENOUS at 06:24

## 2017-03-26 RX ADMIN — CEFTRIAXONE 1 G: 1 INJECTION, POWDER, FOR SOLUTION INTRAMUSCULAR; INTRAVENOUS at 23:51

## 2017-03-26 RX ADMIN — INSULIN LISPRO 10 UNITS: 100 INJECTION, SOLUTION INTRAVENOUS; SUBCUTANEOUS at 07:57

## 2017-03-26 RX ADMIN — IPRATROPIUM BROMIDE AND ALBUTEROL SULFATE 1 AMPULE: .5; 3 SOLUTION RESPIRATORY (INHALATION) at 12:30

## 2017-03-26 RX ADMIN — INSULIN LISPRO 10 UNITS: 100 INJECTION, SOLUTION INTRAVENOUS; SUBCUTANEOUS at 17:12

## 2017-03-26 RX ADMIN — METOPROLOL TARTRATE 25 MG: 25 TABLET ORAL at 09:17

## 2017-03-26 RX ADMIN — INSULIN LISPRO 6 UNITS: 100 INJECTION, SOLUTION INTRAVENOUS; SUBCUTANEOUS at 11:58

## 2017-03-26 RX ADMIN — ASPIRIN 81 MG: 81 TABLET, COATED ORAL at 09:17

## 2017-03-26 RX ADMIN — Medication 10 ML: at 22:31

## 2017-03-26 RX ADMIN — IPRATROPIUM BROMIDE AND ALBUTEROL SULFATE 1 AMPULE: .5; 3 SOLUTION RESPIRATORY (INHALATION) at 07:40

## 2017-03-26 RX ADMIN — SIMVASTATIN 20 MG: 20 TABLET, FILM COATED ORAL at 22:24

## 2017-03-26 RX ADMIN — INSULIN LISPRO 4 UNITS: 100 INJECTION, SOLUTION INTRAVENOUS; SUBCUTANEOUS at 17:12

## 2017-03-26 RX ADMIN — SERTRALINE HYDROCHLORIDE 25 MG: 50 TABLET, FILM COATED ORAL at 22:25

## 2017-03-26 RX ADMIN — METHYLPREDNISOLONE SODIUM SUCCINATE 40 MG: 125 INJECTION, POWDER, FOR SOLUTION INTRAMUSCULAR; INTRAVENOUS at 14:33

## 2017-03-26 RX ADMIN — LISINOPRIL 5 MG: 5 TABLET ORAL at 09:17

## 2017-03-26 RX ADMIN — METHYLPREDNISOLONE SODIUM SUCCINATE 40 MG: 125 INJECTION, POWDER, FOR SOLUTION INTRAMUSCULAR; INTRAVENOUS at 22:30

## 2017-03-26 RX ADMIN — IBUPROFEN 600 MG: 600 TABLET, FILM COATED ORAL at 20:37

## 2017-03-26 RX ADMIN — METOPROLOL TARTRATE 25 MG: 25 TABLET ORAL at 22:24

## 2017-03-26 RX ADMIN — INSULIN GLARGINE 35 UNITS: 100 INJECTION, SOLUTION SUBCUTANEOUS at 09:17

## 2017-03-26 RX ADMIN — INSULIN LISPRO 10 UNITS: 100 INJECTION, SOLUTION INTRAVENOUS; SUBCUTANEOUS at 11:58

## 2017-03-26 RX ADMIN — FLUTICASONE PROPIONATE 1 SPRAY: 50 SPRAY, METERED NASAL at 09:20

## 2017-03-26 RX ADMIN — INSULIN LISPRO 4 UNITS: 100 INJECTION, SOLUTION INTRAVENOUS; SUBCUTANEOUS at 22:25

## 2017-03-26 RX ADMIN — FUROSEMIDE 40 MG: 40 TABLET ORAL at 09:17

## 2017-03-26 RX ADMIN — IPRATROPIUM BROMIDE AND ALBUTEROL SULFATE 1 AMPULE: .5; 3 SOLUTION RESPIRATORY (INHALATION) at 19:57

## 2017-03-26 RX ADMIN — INSULIN LISPRO 2 UNITS: 100 INJECTION, SOLUTION INTRAVENOUS; SUBCUTANEOUS at 07:57

## 2017-03-26 RX ADMIN — AZITHROMYCIN MONOHYDRATE 500 MG: 500 INJECTION, POWDER, LYOPHILIZED, FOR SOLUTION INTRAVENOUS at 18:45

## 2017-03-26 RX ADMIN — IPRATROPIUM BROMIDE AND ALBUTEROL SULFATE 1 AMPULE: .5; 3 SOLUTION RESPIRATORY (INHALATION) at 00:14

## 2017-03-26 RX ADMIN — Medication 10 ML: at 09:20

## 2017-03-26 RX ADMIN — CEPHALEXIN 500 MG: 250 CAPSULE ORAL at 09:17

## 2017-03-26 RX ADMIN — METHYLPREDNISOLONE SODIUM SUCCINATE 80 MG: 125 INJECTION, POWDER, FOR SOLUTION INTRAMUSCULAR; INTRAVENOUS at 00:18

## 2017-03-26 RX ADMIN — IPRATROPIUM BROMIDE AND ALBUTEROL SULFATE 1 AMPULE: .5; 3 SOLUTION RESPIRATORY (INHALATION) at 15:51

## 2017-03-26 RX ADMIN — PANTOPRAZOLE SODIUM 40 MG: 40 TABLET, DELAYED RELEASE ORAL at 09:17

## 2017-03-26 ASSESSMENT — PAIN SCALES - GENERAL: PAINLEVEL_OUTOF10: 4

## 2017-03-27 VITALS
OXYGEN SATURATION: 91 % | DIASTOLIC BLOOD PRESSURE: 83 MMHG | RESPIRATION RATE: 18 BRPM | BODY MASS INDEX: 42.66 KG/M2 | HEART RATE: 97 BPM | SYSTOLIC BLOOD PRESSURE: 146 MMHG | TEMPERATURE: 98.6 F | HEIGHT: 72 IN | WEIGHT: 315 LBS

## 2017-03-27 LAB
ABSOLUTE EOS #: 0 K/UL (ref 0–0.4)
ABSOLUTE LYMPH #: 1.5 K/UL (ref 1–4.8)
ABSOLUTE MONO #: 1.3 K/UL (ref 0.1–1.2)
ALBUMIN SERPL-MCNC: 4.1 G/DL (ref 3.5–5.2)
ALBUMIN/GLOBULIN RATIO: 1.4 (ref 1–2.5)
ALP BLD-CCNC: 57 U/L (ref 40–129)
ALT SERPL-CCNC: 46 U/L (ref 5–41)
ANION GAP SERPL CALCULATED.3IONS-SCNC: 10 MMOL/L (ref 9–17)
AST SERPL-CCNC: 23 U/L
BASOPHILS # BLD: 0 % (ref 0–2)
BASOPHILS ABSOLUTE: 0 K/UL (ref 0–0.2)
BILIRUB SERPL-MCNC: 0.25 MG/DL (ref 0.3–1.2)
BUN BLDV-MCNC: 30 MG/DL (ref 6–20)
BUN/CREAT BLD: 33 (ref 9–20)
CALCIUM SERPL-MCNC: 8.9 MG/DL (ref 8.6–10.4)
CHLORIDE BLD-SCNC: 98 MMOL/L (ref 98–107)
CO2: 36 MMOL/L (ref 20–31)
CREAT SERPL-MCNC: 0.91 MG/DL (ref 0.7–1.2)
DIFFERENTIAL TYPE: ABNORMAL
EOSINOPHILS RELATIVE PERCENT: 0 % (ref 1–4)
GFR AFRICAN AMERICAN: >60 ML/MIN
GFR NON-AFRICAN AMERICAN: >60 ML/MIN
GFR SERPL CREATININE-BSD FRML MDRD: ABNORMAL ML/MIN/{1.73_M2}
GFR SERPL CREATININE-BSD FRML MDRD: ABNORMAL ML/MIN/{1.73_M2}
GLUCOSE BLD-MCNC: 176 MG/DL (ref 70–99)
GLUCOSE BLD-MCNC: 248 MG/DL (ref 75–110)
HCT VFR BLD CALC: 47.4 % (ref 41–53)
HEMOGLOBIN: 14.8 G/DL (ref 13.5–17.5)
LYMPHOCYTES # BLD: 10 % (ref 24–44)
MCH RBC QN AUTO: 29.6 PG (ref 26–34)
MCHC RBC AUTO-ENTMCNC: 31.3 G/DL (ref 31–37)
MCV RBC AUTO: 94.7 FL (ref 80–100)
MONOCYTES # BLD: 9 % (ref 1–7)
PDW BLD-RTO: 14.7 % (ref 11–14.5)
PLATELET # BLD: 326 K/UL (ref 140–450)
PLATELET ESTIMATE: ABNORMAL
PMV BLD AUTO: 9.1 FL (ref 6–12)
POTASSIUM SERPL-SCNC: 4.3 MMOL/L (ref 3.7–5.3)
RBC # BLD: 5.01 M/UL (ref 4.5–5.9)
RBC # BLD: ABNORMAL 10*6/UL
SEG NEUTROPHILS: 81 % (ref 36–66)
SEGMENTED NEUTROPHILS ABSOLUTE COUNT: 11.7 K/UL (ref 1.8–7.7)
SODIUM BLD-SCNC: 144 MMOL/L (ref 135–144)
TOTAL PROTEIN: 7 G/DL (ref 6.4–8.3)
WBC # BLD: 14.5 K/UL (ref 3.5–11)
WBC # BLD: ABNORMAL 10*3/UL

## 2017-03-27 PROCEDURE — 6370000000 HC RX 637 (ALT 250 FOR IP): Performed by: NURSE PRACTITIONER

## 2017-03-27 PROCEDURE — 85025 COMPLETE CBC W/AUTO DIFF WBC: CPT

## 2017-03-27 PROCEDURE — 36415 COLL VENOUS BLD VENIPUNCTURE: CPT

## 2017-03-27 PROCEDURE — 80053 COMPREHEN METABOLIC PANEL: CPT

## 2017-03-27 PROCEDURE — 6360000002 HC RX W HCPCS: Performed by: FAMILY MEDICINE

## 2017-03-27 PROCEDURE — 99239 HOSP IP/OBS DSCHRG MGMT >30: CPT | Performed by: FAMILY MEDICINE

## 2017-03-27 PROCEDURE — 82947 ASSAY GLUCOSE BLOOD QUANT: CPT

## 2017-03-27 PROCEDURE — 94640 AIRWAY INHALATION TREATMENT: CPT

## 2017-03-27 PROCEDURE — 94760 N-INVAS EAR/PLS OXIMETRY 1: CPT

## 2017-03-27 RX ORDER — SIMVASTATIN 20 MG
20 TABLET ORAL NIGHTLY
Qty: 30 TABLET | Refills: 0 | Status: SHIPPED | OUTPATIENT
Start: 2017-03-27 | End: 2017-05-17 | Stop reason: SDUPTHER

## 2017-03-27 RX ORDER — CEFUROXIME AXETIL 500 MG/1
500 TABLET ORAL 2 TIMES DAILY
Qty: 14 TABLET | Refills: 0 | Status: SHIPPED | OUTPATIENT
Start: 2017-03-27 | End: 2017-03-27

## 2017-03-27 RX ORDER — PREDNISONE 10 MG/1
TABLET ORAL
Qty: 14 TABLET | Refills: 0 | Status: SHIPPED | OUTPATIENT
Start: 2017-03-27 | End: 2017-03-31

## 2017-03-27 RX ORDER — CEFUROXIME AXETIL 500 MG/1
500 TABLET ORAL 2 TIMES DAILY
Qty: 14 TABLET | Refills: 0 | Status: SHIPPED | OUTPATIENT
Start: 2017-03-27 | End: 2017-04-03

## 2017-03-27 RX ORDER — AZITHROMYCIN 500 MG/1
500 TABLET, FILM COATED ORAL DAILY
Qty: 4 TABLET | Refills: 0 | Status: SHIPPED | OUTPATIENT
Start: 2017-03-27 | End: 2017-03-31 | Stop reason: ALTCHOICE

## 2017-03-27 RX ADMIN — IPRATROPIUM BROMIDE AND ALBUTEROL SULFATE 1 AMPULE: .5; 3 SOLUTION RESPIRATORY (INHALATION) at 04:34

## 2017-03-27 RX ADMIN — ASPIRIN 81 MG: 81 TABLET, COATED ORAL at 08:24

## 2017-03-27 RX ADMIN — INSULIN LISPRO 15 UNITS: 100 INJECTION, SOLUTION INTRAVENOUS; SUBCUTANEOUS at 11:51

## 2017-03-27 RX ADMIN — FLUTICASONE PROPIONATE 1 SPRAY: 50 SPRAY, METERED NASAL at 08:24

## 2017-03-27 RX ADMIN — METOPROLOL TARTRATE 25 MG: 25 TABLET ORAL at 08:24

## 2017-03-27 RX ADMIN — FUROSEMIDE 40 MG: 40 TABLET ORAL at 08:24

## 2017-03-27 RX ADMIN — IPRATROPIUM BROMIDE AND ALBUTEROL SULFATE 1 AMPULE: .5; 3 SOLUTION RESPIRATORY (INHALATION) at 00:10

## 2017-03-27 RX ADMIN — IPRATROPIUM BROMIDE AND ALBUTEROL SULFATE 1 AMPULE: .5; 3 SOLUTION RESPIRATORY (INHALATION) at 07:39

## 2017-03-27 RX ADMIN — INSULIN LISPRO 2 UNITS: 100 INJECTION, SOLUTION INTRAVENOUS; SUBCUTANEOUS at 08:26

## 2017-03-27 RX ADMIN — METHYLPREDNISOLONE SODIUM SUCCINATE 40 MG: 125 INJECTION, POWDER, FOR SOLUTION INTRAMUSCULAR; INTRAVENOUS at 06:44

## 2017-03-27 RX ADMIN — PANTOPRAZOLE SODIUM 40 MG: 40 TABLET, DELAYED RELEASE ORAL at 08:24

## 2017-03-27 RX ADMIN — INSULIN LISPRO 15 UNITS: 100 INJECTION, SOLUTION INTRAVENOUS; SUBCUTANEOUS at 08:25

## 2017-03-27 RX ADMIN — LISINOPRIL 5 MG: 5 TABLET ORAL at 08:24

## 2017-03-27 RX ADMIN — INSULIN GLARGINE 35 UNITS: 100 INJECTION, SOLUTION SUBCUTANEOUS at 08:24

## 2017-03-27 RX ADMIN — IPRATROPIUM BROMIDE AND ALBUTEROL SULFATE 1 AMPULE: .5; 3 SOLUTION RESPIRATORY (INHALATION) at 11:31

## 2017-03-27 RX ADMIN — INSULIN LISPRO 4 UNITS: 100 INJECTION, SOLUTION INTRAVENOUS; SUBCUTANEOUS at 11:49

## 2017-03-27 ASSESSMENT — PAIN SCALES - GENERAL: PAINLEVEL_OUTOF10: 0

## 2017-03-28 ENCOUNTER — TELEPHONE (OUTPATIENT)
Dept: INTERNAL MEDICINE | Age: 42
End: 2017-03-28

## 2017-03-31 ENCOUNTER — OFFICE VISIT (OUTPATIENT)
Dept: INTERNAL MEDICINE | Age: 42
End: 2017-03-31
Payer: COMMERCIAL

## 2017-03-31 VITALS
HEIGHT: 73 IN | HEART RATE: 92 BPM | TEMPERATURE: 99.3 F | SYSTOLIC BLOOD PRESSURE: 140 MMHG | BODY MASS INDEX: 41.75 KG/M2 | RESPIRATION RATE: 20 BRPM | DIASTOLIC BLOOD PRESSURE: 80 MMHG | WEIGHT: 315 LBS

## 2017-03-31 DIAGNOSIS — Z11.4 SCREENING FOR HIV (HUMAN IMMUNODEFICIENCY VIRUS): ICD-10-CM

## 2017-03-31 DIAGNOSIS — R91.1 PULMONARY NODULE: ICD-10-CM

## 2017-03-31 DIAGNOSIS — E11.9 TYPE 2 DIABETES MELLITUS WITHOUT COMPLICATION, WITHOUT LONG-TERM CURRENT USE OF INSULIN (HCC): ICD-10-CM

## 2017-03-31 DIAGNOSIS — J44.9 CHRONIC OBSTRUCTIVE PULMONARY DISEASE, UNSPECIFIED COPD TYPE (HCC): ICD-10-CM

## 2017-03-31 DIAGNOSIS — I10 ESSENTIAL HYPERTENSION: Primary | ICD-10-CM

## 2017-03-31 DIAGNOSIS — I71.21 ASCENDING AORTIC ANEURYSM: ICD-10-CM

## 2017-03-31 PROCEDURE — 99214 OFFICE O/P EST MOD 30 MIN: CPT | Performed by: INTERNAL MEDICINE

## 2017-03-31 ASSESSMENT — ENCOUNTER SYMPTOMS
CONSTIPATION: 0
DIARRHEA: 0
ABDOMINAL PAIN: 0
BLOOD IN STOOL: 0
NAUSEA: 0
COUGH: 0
VOMITING: 0
SHORTNESS OF BREATH: 0
BACK PAIN: 0
EYE PAIN: 0

## 2017-03-31 ASSESSMENT — COPD QUESTIONNAIRES: COPD: 1

## 2017-04-04 ENCOUNTER — TELEPHONE (OUTPATIENT)
Dept: CARDIOTHORACIC SURGERY | Age: 42
End: 2017-04-04

## 2017-05-18 RX ORDER — SIMVASTATIN 20 MG
20 TABLET ORAL NIGHTLY
Qty: 30 TABLET | Refills: 11 | Status: SHIPPED | OUTPATIENT
Start: 2017-05-18 | End: 2018-01-01 | Stop reason: SDUPTHER

## 2017-05-26 DIAGNOSIS — J44.9 CHRONIC OBSTRUCTIVE PULMONARY DISEASE, UNSPECIFIED COPD TYPE (HCC): Primary | ICD-10-CM

## 2017-05-31 ENCOUNTER — OFFICE VISIT (OUTPATIENT)
Dept: PULMONOLOGY | Age: 42
End: 2017-05-31
Payer: COMMERCIAL

## 2017-05-31 VITALS
HEIGHT: 71 IN | RESPIRATION RATE: 18 BRPM | HEART RATE: 111 BPM | TEMPERATURE: 98.9 F | OXYGEN SATURATION: 91 % | WEIGHT: 315 LBS | BODY MASS INDEX: 44.1 KG/M2 | SYSTOLIC BLOOD PRESSURE: 134 MMHG | DIASTOLIC BLOOD PRESSURE: 84 MMHG

## 2017-05-31 DIAGNOSIS — E66.9 RESTRICTIVE LUNG DISEASE SECONDARY TO OBESITY: ICD-10-CM

## 2017-05-31 DIAGNOSIS — G47.30 SLEEP-RELATED BREATHING DISORDER: ICD-10-CM

## 2017-05-31 DIAGNOSIS — E66.01 MORBID OBESITY DUE TO EXCESS CALORIES (HCC): ICD-10-CM

## 2017-05-31 DIAGNOSIS — J45.909 UNCOMPLICATED ASTHMA, UNSPECIFIED ASTHMA SEVERITY: ICD-10-CM

## 2017-05-31 DIAGNOSIS — I71.21 ASCENDING AORTIC ANEURYSM: ICD-10-CM

## 2017-05-31 DIAGNOSIS — Z90.81 S/P SPLENECTOMY: ICD-10-CM

## 2017-05-31 DIAGNOSIS — R91.8 MULTIPLE LUNG NODULES ON CT: Primary | ICD-10-CM

## 2017-05-31 DIAGNOSIS — I27.81 COR PULMONALE (CHRONIC) (HCC): ICD-10-CM

## 2017-05-31 DIAGNOSIS — J98.4 RESTRICTIVE LUNG DISEASE SECONDARY TO OBESITY: ICD-10-CM

## 2017-05-31 PROCEDURE — 99205 OFFICE O/P NEW HI 60 MIN: CPT | Performed by: INTERNAL MEDICINE

## 2017-07-12 ENCOUNTER — INITIAL CONSULT (OUTPATIENT)
Dept: CARDIOTHORACIC SURGERY | Age: 42
End: 2017-07-12
Payer: COMMERCIAL

## 2017-07-12 VITALS
DIASTOLIC BLOOD PRESSURE: 83 MMHG | SYSTOLIC BLOOD PRESSURE: 133 MMHG | HEART RATE: 113 BPM | WEIGHT: 315 LBS | HEIGHT: 73 IN | OXYGEN SATURATION: 78 % | BODY MASS INDEX: 41.75 KG/M2 | RESPIRATION RATE: 23 BRPM

## 2017-07-12 DIAGNOSIS — I71.21 ASCENDING AORTIC ANEURYSM: Primary | ICD-10-CM

## 2017-07-12 PROCEDURE — 99215 OFFICE O/P EST HI 40 MIN: CPT | Performed by: PHYSICIAN ASSISTANT

## 2017-07-12 RX ORDER — SERTRALINE HYDROCHLORIDE 20 MG/ML
25 SOLUTION ORAL DAILY
COMMUNITY
End: 2017-08-07 | Stop reason: CLARIF

## 2017-08-02 DIAGNOSIS — G47.33 SEVERE OBSTRUCTIVE SLEEP APNEA: Primary | ICD-10-CM

## 2017-08-07 ENCOUNTER — OFFICE VISIT (OUTPATIENT)
Dept: INTERNAL MEDICINE | Age: 42
End: 2017-08-07
Payer: COMMERCIAL

## 2017-08-07 VITALS
OXYGEN SATURATION: 88 % | HEIGHT: 73 IN | HEART RATE: 100 BPM | SYSTOLIC BLOOD PRESSURE: 124 MMHG | BODY MASS INDEX: 41.75 KG/M2 | DIASTOLIC BLOOD PRESSURE: 88 MMHG | RESPIRATION RATE: 20 BRPM | WEIGHT: 315 LBS

## 2017-08-07 DIAGNOSIS — J44.9 CHRONIC OBSTRUCTIVE PULMONARY DISEASE, UNSPECIFIED COPD TYPE (HCC): ICD-10-CM

## 2017-08-07 DIAGNOSIS — I10 ESSENTIAL HYPERTENSION: Primary | ICD-10-CM

## 2017-08-07 DIAGNOSIS — E11.9 TYPE 2 DIABETES MELLITUS WITHOUT COMPLICATION, WITHOUT LONG-TERM CURRENT USE OF INSULIN (HCC): ICD-10-CM

## 2017-08-07 DIAGNOSIS — G47.33 SEVERE OBSTRUCTIVE SLEEP APNEA: ICD-10-CM

## 2017-08-07 PROCEDURE — 99214 OFFICE O/P EST MOD 30 MIN: CPT | Performed by: INTERNAL MEDICINE

## 2017-08-07 RX ORDER — LISINOPRIL 5 MG/1
TABLET ORAL
Qty: 90 TABLET | Refills: 3 | Status: SHIPPED | OUTPATIENT
Start: 2017-08-07 | End: 2018-01-01 | Stop reason: SDUPTHER

## 2017-08-07 RX ORDER — SERTRALINE HYDROCHLORIDE 25 MG/1
25 TABLET, FILM COATED ORAL DAILY
COMMUNITY
End: 2018-03-02 | Stop reason: SDUPTHER

## 2017-08-07 ASSESSMENT — PATIENT HEALTH QUESTIONNAIRE - PHQ9
1. LITTLE INTEREST OR PLEASURE IN DOING THINGS: 0
2. FEELING DOWN, DEPRESSED OR HOPELESS: 0
SUM OF ALL RESPONSES TO PHQ9 QUESTIONS 1 & 2: 0
SUM OF ALL RESPONSES TO PHQ QUESTIONS 1-9: 0

## 2017-08-07 ASSESSMENT — ENCOUNTER SYMPTOMS
CONSTIPATION: 0
ABDOMINAL PAIN: 0
VOMITING: 0
BLOOD IN STOOL: 0
EYE PAIN: 0
SHORTNESS OF BREATH: 0
DIARRHEA: 0
BACK PAIN: 0
NAUSEA: 0
COUGH: 0

## 2017-08-07 ASSESSMENT — COPD QUESTIONNAIRES: COPD: 1

## 2017-09-20 ENCOUNTER — TELEPHONE (OUTPATIENT)
Dept: INTERNAL MEDICINE | Age: 42
End: 2017-09-20

## 2017-10-01 ENCOUNTER — HOSPITAL ENCOUNTER (INPATIENT)
Age: 42
LOS: 1 days | Discharge: ANOTHER HOSPITAL | DRG: 189 | End: 2017-10-02
Attending: EMERGENCY MEDICINE | Admitting: FAMILY MEDICINE
Payer: COMMERCIAL

## 2017-10-01 ENCOUNTER — APPOINTMENT (OUTPATIENT)
Dept: GENERAL RADIOLOGY | Age: 42
DRG: 189 | End: 2017-10-01
Payer: COMMERCIAL

## 2017-10-01 DIAGNOSIS — I50.9 ACUTE CONGESTIVE HEART FAILURE, UNSPECIFIED CONGESTIVE HEART FAILURE TYPE: Primary | ICD-10-CM

## 2017-10-01 DIAGNOSIS — R06.89 HYPERCAPNIA: ICD-10-CM

## 2017-10-01 PROBLEM — J96.92 HYPERCAPNIC RESPIRATORY FAILURE (HCC): Status: ACTIVE | Noted: 2017-10-01

## 2017-10-01 LAB
ABSOLUTE EOS #: 0.1 K/UL (ref 0–0.4)
ABSOLUTE LYMPH #: 2.8 K/UL (ref 1–4.8)
ABSOLUTE MONO #: 1.4 K/UL (ref 0.1–1.2)
ANION GAP SERPL CALCULATED.3IONS-SCNC: 7 MMOL/L (ref 9–17)
BASOPHILS # BLD: 0 % (ref 0–2)
BASOPHILS ABSOLUTE: 0.1 K/UL (ref 0–0.2)
BNP INTERPRETATION: ABNORMAL
BUN BLDV-MCNC: 25 MG/DL (ref 6–20)
BUN/CREAT BLD: 29 (ref 9–20)
CALCIUM SERPL-MCNC: 8.9 MG/DL (ref 8.6–10.4)
CHLORIDE BLD-SCNC: 99 MMOL/L (ref 98–107)
CK MB: 5 NG/ML
CO2: 37 MMOL/L (ref 20–31)
CREAT SERPL-MCNC: 0.86 MG/DL (ref 0.7–1.2)
DIFFERENTIAL TYPE: ABNORMAL
EOSINOPHILS RELATIVE PERCENT: 1 % (ref 1–8)
FIO2: 3
GFR AFRICAN AMERICAN: >60 ML/MIN
GFR NON-AFRICAN AMERICAN: >60 ML/MIN
GFR SERPL CREATININE-BSD FRML MDRD: ABNORMAL ML/MIN/{1.73_M2}
GFR SERPL CREATININE-BSD FRML MDRD: ABNORMAL ML/MIN/{1.73_M2}
GLUCOSE BLD-MCNC: 124 MG/DL (ref 75–110)
GLUCOSE BLD-MCNC: 190 MG/DL (ref 70–99)
HCT VFR BLD CALC: 50.7 % (ref 41–53)
HEMOGLOBIN: 15.2 G/DL (ref 13.5–17.5)
INR BLD: 1.1
LYMPHOCYTES # BLD: 21 % (ref 15–43)
MCH RBC QN AUTO: 28.3 PG (ref 26–34)
MCHC RBC AUTO-ENTMCNC: 30 G/DL (ref 31–37)
MCV RBC AUTO: 94.5 FL (ref 80–100)
MONOCYTES # BLD: 11 % (ref 6–14)
MYOGLOBIN: 53 NG/ML (ref 28–72)
NEGATIVE BASE EXCESS, ART: ABNORMAL (ref 0–2)
O2 DEVICE/FLOW/%: ABNORMAL
PARTIAL THROMBOPLASTIN TIME: 25.9 SEC (ref 27–35)
PATIENT TEMP: 37
PDW BLD-RTO: 16.4 % (ref 11–14.5)
PLATELET # BLD: 234 K/UL (ref 140–450)
PLATELET ESTIMATE: ABNORMAL
PMV BLD AUTO: 9.6 FL (ref 6–12)
POC HCO3: 38.8 MMOL/L (ref 22–27)
POC O2 SATURATION: ABNORMAL %
POC PCO2 TEMP: ABNORMAL MM HG
POC PCO2: 76 MM HG (ref 32–45)
POC PH TEMP: ABNORMAL
POC PH: 7.32 (ref 7.35–7.45)
POC PO2 TEMP: ABNORMAL MM HG
POC PO2: 42 MM HG (ref 75–95)
POSITIVE BASE EXCESS, ART: 13 (ref 0–2)
POTASSIUM SERPL-SCNC: 4.7 MMOL/L (ref 3.7–5.3)
PRO-BNP: 2914 PG/ML
PROTHROMBIN TIME: 11.8 SEC (ref 9.4–11.3)
RBC # BLD: 5.36 M/UL (ref 4.5–5.9)
RBC # BLD: ABNORMAL 10*6/UL
SEG NEUTROPHILS: 67 % (ref 44–74)
SEGMENTED NEUTROPHILS ABSOLUTE COUNT: 9.2 K/UL (ref 1.8–7.7)
SODIUM BLD-SCNC: 143 MMOL/L (ref 135–144)
TCO2 (CALC), ART: 41 MMOL/L (ref 23–28)
TOTAL CK: 142 U/L (ref 39–308)
TROPONIN INTERP: NORMAL
TROPONIN T: <0.03 NG/ML
WBC # BLD: 13.6 K/UL (ref 3.5–11)
WBC # BLD: ABNORMAL 10*3/UL

## 2017-10-01 PROCEDURE — 93005 ELECTROCARDIOGRAM TRACING: CPT

## 2017-10-01 PROCEDURE — 2060000000 HC ICU INTERMEDIATE R&B

## 2017-10-01 PROCEDURE — 82803 BLOOD GASES ANY COMBINATION: CPT

## 2017-10-01 PROCEDURE — 82550 ASSAY OF CK (CPK): CPT

## 2017-10-01 PROCEDURE — 82553 CREATINE MB FRACTION: CPT

## 2017-10-01 PROCEDURE — 85730 THROMBOPLASTIN TIME PARTIAL: CPT

## 2017-10-01 PROCEDURE — 96374 THER/PROPH/DIAG INJ IV PUSH: CPT

## 2017-10-01 PROCEDURE — 36415 COLL VENOUS BLD VENIPUNCTURE: CPT

## 2017-10-01 PROCEDURE — 36600 WITHDRAWAL OF ARTERIAL BLOOD: CPT

## 2017-10-01 PROCEDURE — 82947 ASSAY GLUCOSE BLOOD QUANT: CPT

## 2017-10-01 PROCEDURE — 85610 PROTHROMBIN TIME: CPT

## 2017-10-01 PROCEDURE — 83880 ASSAY OF NATRIURETIC PEPTIDE: CPT

## 2017-10-01 PROCEDURE — 84484 ASSAY OF TROPONIN QUANT: CPT

## 2017-10-01 PROCEDURE — 99285 EMERGENCY DEPT VISIT HI MDM: CPT

## 2017-10-01 PROCEDURE — 6370000000 HC RX 637 (ALT 250 FOR IP): Performed by: EMERGENCY MEDICINE

## 2017-10-01 PROCEDURE — 6360000002 HC RX W HCPCS: Performed by: EMERGENCY MEDICINE

## 2017-10-01 PROCEDURE — 83874 ASSAY OF MYOGLOBIN: CPT

## 2017-10-01 PROCEDURE — 85025 COMPLETE CBC W/AUTO DIFF WBC: CPT

## 2017-10-01 PROCEDURE — 80048 BASIC METABOLIC PNL TOTAL CA: CPT

## 2017-10-01 PROCEDURE — 71020 XR CHEST STANDARD TWO VW: CPT

## 2017-10-01 PROCEDURE — 94002 VENT MGMT INPAT INIT DAY: CPT

## 2017-10-01 PROCEDURE — 94640 AIRWAY INHALATION TREATMENT: CPT

## 2017-10-01 PROCEDURE — 51702 INSERT TEMP BLADDER CATH: CPT

## 2017-10-01 RX ORDER — NICOTINE POLACRILEX 4 MG
15 LOZENGE BUCCAL PRN
Status: DISCONTINUED | OUTPATIENT
Start: 2017-10-01 | End: 2017-10-02 | Stop reason: HOSPADM

## 2017-10-01 RX ORDER — IPRATROPIUM BROMIDE AND ALBUTEROL SULFATE 2.5; .5 MG/3ML; MG/3ML
1 SOLUTION RESPIRATORY (INHALATION) ONCE
Status: COMPLETED | OUTPATIENT
Start: 2017-10-01 | End: 2017-10-01

## 2017-10-01 RX ORDER — ONDANSETRON 2 MG/ML
4 INJECTION INTRAMUSCULAR; INTRAVENOUS EVERY 6 HOURS PRN
Status: DISCONTINUED | OUTPATIENT
Start: 2017-10-01 | End: 2017-10-02 | Stop reason: HOSPADM

## 2017-10-01 RX ORDER — ASPIRIN 81 MG/1
81 TABLET ORAL DAILY
Status: DISCONTINUED | OUTPATIENT
Start: 2017-10-02 | End: 2017-10-02 | Stop reason: HOSPADM

## 2017-10-01 RX ORDER — ALBUTEROL SULFATE 2.5 MG/3ML
2.5 SOLUTION RESPIRATORY (INHALATION) EVERY 4 HOURS
Status: DISCONTINUED | OUTPATIENT
Start: 2017-10-01 | End: 2017-10-02 | Stop reason: HOSPADM

## 2017-10-01 RX ORDER — FUROSEMIDE 10 MG/ML
40 INJECTION INTRAMUSCULAR; INTRAVENOUS 2 TIMES DAILY
Status: DISCONTINUED | OUTPATIENT
Start: 2017-10-02 | End: 2017-10-02 | Stop reason: HOSPADM

## 2017-10-01 RX ORDER — FUROSEMIDE 10 MG/ML
40 INJECTION INTRAMUSCULAR; INTRAVENOUS ONCE
Status: COMPLETED | OUTPATIENT
Start: 2017-10-01 | End: 2017-10-01

## 2017-10-01 RX ORDER — DEXTROSE MONOHYDRATE 25 G/50ML
12.5 INJECTION, SOLUTION INTRAVENOUS PRN
Status: DISCONTINUED | OUTPATIENT
Start: 2017-10-01 | End: 2017-10-02 | Stop reason: HOSPADM

## 2017-10-01 RX ORDER — SODIUM CHLORIDE 0.9 % (FLUSH) 0.9 %
10 SYRINGE (ML) INJECTION EVERY 12 HOURS SCHEDULED
Status: DISCONTINUED | OUTPATIENT
Start: 2017-10-01 | End: 2017-10-02 | Stop reason: HOSPADM

## 2017-10-01 RX ORDER — ACETAMINOPHEN 325 MG/1
650 TABLET ORAL EVERY 4 HOURS PRN
Status: DISCONTINUED | OUTPATIENT
Start: 2017-10-01 | End: 2017-10-02 | Stop reason: HOSPADM

## 2017-10-01 RX ORDER — LISINOPRIL 5 MG/1
5 TABLET ORAL DAILY
Status: DISCONTINUED | OUTPATIENT
Start: 2017-10-02 | End: 2017-10-02 | Stop reason: HOSPADM

## 2017-10-01 RX ORDER — DEXTROSE MONOHYDRATE 50 MG/ML
100 INJECTION, SOLUTION INTRAVENOUS PRN
Status: DISCONTINUED | OUTPATIENT
Start: 2017-10-01 | End: 2017-10-02 | Stop reason: HOSPADM

## 2017-10-01 RX ORDER — PANTOPRAZOLE SODIUM 40 MG/1
40 TABLET, DELAYED RELEASE ORAL
Status: DISCONTINUED | OUTPATIENT
Start: 2017-10-02 | End: 2017-10-02 | Stop reason: HOSPADM

## 2017-10-01 RX ORDER — SIMVASTATIN 20 MG
20 TABLET ORAL NIGHTLY
Status: DISCONTINUED | OUTPATIENT
Start: 2017-10-01 | End: 2017-10-02 | Stop reason: HOSPADM

## 2017-10-01 RX ORDER — SODIUM CHLORIDE 0.9 % (FLUSH) 0.9 %
10 SYRINGE (ML) INJECTION PRN
Status: DISCONTINUED | OUTPATIENT
Start: 2017-10-01 | End: 2017-10-02 | Stop reason: HOSPADM

## 2017-10-01 RX ORDER — METHYLPREDNISOLONE SODIUM SUCCINATE 125 MG/2ML
125 INJECTION, POWDER, LYOPHILIZED, FOR SOLUTION INTRAMUSCULAR; INTRAVENOUS EVERY 6 HOURS
Status: DISCONTINUED | OUTPATIENT
Start: 2017-10-01 | End: 2017-10-02 | Stop reason: HOSPADM

## 2017-10-01 RX ADMIN — IPRATROPIUM BROMIDE AND ALBUTEROL SULFATE 1 AMPULE: .5; 3 SOLUTION RESPIRATORY (INHALATION) at 21:49

## 2017-10-01 RX ADMIN — FUROSEMIDE 40 MG: 10 INJECTION, SOLUTION INTRAMUSCULAR; INTRAVENOUS at 22:50

## 2017-10-01 ASSESSMENT — PAIN SCALES - GENERAL: PAINLEVEL_OUTOF10: 0

## 2017-10-01 ASSESSMENT — ENCOUNTER SYMPTOMS
SHORTNESS OF BREATH: 1
EYES NEGATIVE: 1
GASTROINTESTINAL NEGATIVE: 1

## 2017-10-02 ENCOUNTER — APPOINTMENT (OUTPATIENT)
Dept: GENERAL RADIOLOGY | Age: 42
DRG: 208 | End: 2017-10-02
Attending: INTERNAL MEDICINE
Payer: COMMERCIAL

## 2017-10-02 ENCOUNTER — HOSPITAL ENCOUNTER (INPATIENT)
Age: 42
LOS: 5 days | Discharge: HOME | DRG: 208 | End: 2017-10-07
Attending: INTERNAL MEDICINE | Admitting: INTERNAL MEDICINE
Payer: COMMERCIAL

## 2017-10-02 ENCOUNTER — APPOINTMENT (OUTPATIENT)
Dept: GENERAL RADIOLOGY | Age: 42
DRG: 189 | End: 2017-10-02
Payer: COMMERCIAL

## 2017-10-02 VITALS
RESPIRATION RATE: 19 BRPM | TEMPERATURE: 98.5 F | BODY MASS INDEX: 42.66 KG/M2 | OXYGEN SATURATION: 96 % | SYSTOLIC BLOOD PRESSURE: 110 MMHG | HEIGHT: 72 IN | WEIGHT: 315 LBS | DIASTOLIC BLOOD PRESSURE: 64 MMHG | HEART RATE: 66 BPM

## 2017-10-02 DIAGNOSIS — J44.1 ACUTE EXACERBATION OF CHRONIC OBSTRUCTIVE PULMONARY DISEASE (COPD) (HCC): Primary | ICD-10-CM

## 2017-10-02 PROBLEM — J45.901 ASTHMA WITH ACUTE EXACERBATION: Status: ACTIVE | Noted: 2017-10-02

## 2017-10-02 PROBLEM — J98.4 MIXED RESTRICTIVE AND OBSTRUCTIVE LUNG DISEASE (HCC): Status: ACTIVE | Noted: 2017-10-02

## 2017-10-02 PROBLEM — J43.9 MIXED RESTRICTIVE AND OBSTRUCTIVE LUNG DISEASE (HCC): Status: ACTIVE | Noted: 2017-10-02

## 2017-10-02 PROBLEM — I50.813 ACUTE ON CHRONIC RIGHT HEART FAILURE (HCC): Status: ACTIVE | Noted: 2017-10-02

## 2017-10-02 PROBLEM — L03.311 ABDOMINAL WALL CELLULITIS: Status: ACTIVE | Noted: 2017-10-02

## 2017-10-02 PROBLEM — J96.21 ACUTE ON CHRONIC RESPIRATORY FAILURE WITH HYPOXIA AND HYPERCAPNIA (HCC): Status: ACTIVE | Noted: 2017-10-01

## 2017-10-02 PROBLEM — J96.22 ACUTE ON CHRONIC RESPIRATORY FAILURE WITH HYPOXIA AND HYPERCAPNIA (HCC): Status: ACTIVE | Noted: 2017-10-01

## 2017-10-02 PROBLEM — E66.01 MORBID OBESITY WITH BMI OF 45.0-49.9, ADULT (HCC): Status: ACTIVE | Noted: 2017-10-02

## 2017-10-02 LAB
-: ABNORMAL
ABSOLUTE EOS #: 0 K/UL (ref 0–0.4)
ABSOLUTE LYMPH #: 1.1 K/UL (ref 1–4.8)
ABSOLUTE MONO #: 0.6 K/UL (ref 0.1–1.2)
ALBUMIN SERPL-MCNC: 2.8 G/DL (ref 3.5–5.2)
ALBUMIN/GLOBULIN RATIO: 0.8 (ref 1–2.5)
ALP BLD-CCNC: 56 U/L (ref 40–129)
ALT SERPL-CCNC: 22 U/L (ref 5–41)
ALT SERPL-CCNC: ABNORMAL U/L (ref 5–41)
AMORPHOUS: ABNORMAL
ANION GAP SERPL CALCULATED.3IONS-SCNC: 17 MMOL/L (ref 9–17)
AST SERPL-CCNC: 32 U/L
AST SERPL-CCNC: ABNORMAL U/L
BACTERIA: ABNORMAL
BASOPHILS # BLD: 0 %
BASOPHILS ABSOLUTE: 0 K/UL (ref 0–0.2)
BILIRUB SERPL-MCNC: 0.51 MG/DL (ref 0.3–1.2)
BILIRUB SERPL-MCNC: ABNORMAL MG/DL (ref 0.3–1.2)
BILIRUBIN DIRECT: <0.08 MG/DL
BILIRUBIN DIRECT: ABNORMAL MG/DL
BILIRUBIN URINE: NEGATIVE
BILIRUBIN, INDIRECT: ABNORMAL MG/DL (ref 0–1)
BILIRUBIN, INDIRECT: NORMAL MG/DL (ref 0–1)
BNP INTERPRETATION: ABNORMAL
BUN BLDV-MCNC: 25 MG/DL (ref 6–20)
BUN/CREAT BLD: ABNORMAL (ref 9–20)
CALCIUM SERPL-MCNC: 8.8 MG/DL (ref 8.6–10.4)
CASTS UA: ABNORMAL /LPF (ref 0–2)
CHLORIDE BLD-SCNC: 94 MMOL/L (ref 98–107)
CO2: 30 MMOL/L (ref 20–31)
COLOR: YELLOW
COMMENT UA: ABNORMAL
CREAT SERPL-MCNC: 0.91 MG/DL (ref 0.7–1.2)
CRYSTALS, UA: ABNORMAL /HPF
DIFFERENTIAL TYPE: ABNORMAL
DIRECT EXAM: NORMAL
EKG ATRIAL RATE: 96 BPM
EKG P AXIS: 37 DEGREES
EKG P-R INTERVAL: 160 MS
EKG Q-T INTERVAL: 328 MS
EKG QRS DURATION: 84 MS
EKG QTC CALCULATION (BAZETT): 414 MS
EKG R AXIS: 15 DEGREES
EKG T AXIS: 1 DEGREES
EKG VENTRICULAR RATE: 96 BPM
EOSINOPHILS RELATIVE PERCENT: 0 %
EPITHELIAL CELLS UA: ABNORMAL /HPF (ref 0–5)
GFR AFRICAN AMERICAN: >60 ML/MIN
GFR NON-AFRICAN AMERICAN: >60 ML/MIN
GFR SERPL CREATININE-BSD FRML MDRD: ABNORMAL ML/MIN/{1.73_M2}
GFR SERPL CREATININE-BSD FRML MDRD: ABNORMAL ML/MIN/{1.73_M2}
GLOBULIN: ABNORMAL G/DL (ref 1.5–3.8)
GLUCOSE BLD-MCNC: 132 MG/DL (ref 75–110)
GLUCOSE BLD-MCNC: 142 MG/DL (ref 70–99)
GLUCOSE BLD-MCNC: 143 MG/DL (ref 75–110)
GLUCOSE BLD-MCNC: 147 MG/DL (ref 75–110)
GLUCOSE BLD-MCNC: 151 MG/DL (ref 75–110)
GLUCOSE URINE: NEGATIVE
HCT VFR BLD CALC: 48 % (ref 41–53)
HEMOGLOBIN: 15.2 G/DL (ref 13.5–17.5)
KETONES, URINE: NEGATIVE
LACTIC ACID, WHOLE BLOOD: 2 MMOL/L (ref 0.7–2.1)
LEUKOCYTE ESTERASE, URINE: ABNORMAL
LV EF: 50 %
LVEF MODALITY: NORMAL
LYMPHOCYTES # BLD: 8 %
Lab: NORMAL
MAGNESIUM: 1.9 MG/DL (ref 1.6–2.6)
MCH RBC QN AUTO: 28.7 PG (ref 26–34)
MCHC RBC AUTO-ENTMCNC: 31.7 G/DL (ref 31–37)
MCV RBC AUTO: 90.6 FL (ref 80–100)
MONOCYTES # BLD: 4 %
MRSA, DNA, NASAL: NORMAL
MUCUS: ABNORMAL
NITRITE, URINE: NEGATIVE
OTHER OBSERVATIONS UA: ABNORMAL
PDW BLD-RTO: 16.9 % (ref 12.5–15.4)
PH UA: 5.5 (ref 5–8)
PHOSPHORUS: 1.8 MG/DL (ref 2.5–4.5)
PLATELET # BLD: 224 K/UL (ref 140–450)
PLATELET ESTIMATE: ABNORMAL
PMV BLD AUTO: 9.3 FL (ref 6–12)
POTASSIUM SERPL-SCNC: 4.7 MMOL/L (ref 3.7–5.3)
PRO-BNP: 1470 PG/ML
PROTEIN UA: ABNORMAL
RBC # BLD: 5.3 M/UL (ref 4.5–5.9)
RBC # BLD: ABNORMAL 10*6/UL
RBC UA: ABNORMAL /HPF (ref 0–2)
RENAL EPITHELIAL, UA: ABNORMAL /HPF
SEG NEUTROPHILS: 88 %
SEGMENTED NEUTROPHILS ABSOLUTE COUNT: 12.2 K/UL (ref 1.8–7.7)
SODIUM BLD-SCNC: 141 MMOL/L (ref 135–144)
SPECIFIC GRAVITY UA: 1.02 (ref 1–1.03)
SPECIMEN DESCRIPTION: NORMAL
STATUS: NORMAL
TOTAL PROTEIN: 6.4 G/DL (ref 6.4–8.3)
TRICHOMONAS: ABNORMAL
TROPONIN INTERP: NORMAL
TROPONIN T: <0.03 NG/ML
TURBIDITY: ABNORMAL
URINE HGB: ABNORMAL
UROBILINOGEN, URINE: NORMAL
WBC # BLD: 13.8 K/UL (ref 3.5–11)
WBC # BLD: ABNORMAL 10*3/UL
WBC UA: ABNORMAL /HPF (ref 0–5)
YEAST: ABNORMAL

## 2017-10-02 PROCEDURE — 94003 VENT MGMT INPAT SUBQ DAY: CPT

## 2017-10-02 PROCEDURE — 82947 ASSAY GLUCOSE BLOOD QUANT: CPT

## 2017-10-02 PROCEDURE — 36600 WITHDRAWAL OF ARTERIAL BLOOD: CPT

## 2017-10-02 PROCEDURE — 87205 SMEAR GRAM STAIN: CPT

## 2017-10-02 PROCEDURE — 51798 US URINE CAPACITY MEASURE: CPT

## 2017-10-02 PROCEDURE — 84450 TRANSFERASE (AST) (SGOT): CPT

## 2017-10-02 PROCEDURE — 87804 INFLUENZA ASSAY W/OPTIC: CPT

## 2017-10-02 PROCEDURE — 82803 BLOOD GASES ANY COMBINATION: CPT

## 2017-10-02 PROCEDURE — 76937 US GUIDE VASCULAR ACCESS: CPT

## 2017-10-02 PROCEDURE — 6360000002 HC RX W HCPCS: Performed by: PHYSICIAN ASSISTANT

## 2017-10-02 PROCEDURE — 31500 INSERT EMERGENCY AIRWAY: CPT

## 2017-10-02 PROCEDURE — 93970 EXTREMITY STUDY: CPT

## 2017-10-02 PROCEDURE — 2500000003 HC RX 250 WO HCPCS: Performed by: EMERGENCY MEDICINE

## 2017-10-02 PROCEDURE — 6360000002 HC RX W HCPCS: Performed by: EMERGENCY MEDICINE

## 2017-10-02 PROCEDURE — 87641 MR-STAPH DNA AMP PROBE: CPT

## 2017-10-02 PROCEDURE — 6370000000 HC RX 637 (ALT 250 FOR IP): Performed by: PHYSICIAN ASSISTANT

## 2017-10-02 PROCEDURE — 36620 INSERTION CATHETER ARTERY: CPT

## 2017-10-02 PROCEDURE — 99291 CRITICAL CARE FIRST HOUR: CPT | Performed by: INTERNAL MEDICINE

## 2017-10-02 PROCEDURE — 87449 NOS EACH ORGANISM AG IA: CPT

## 2017-10-02 PROCEDURE — 6360000002 HC RX W HCPCS

## 2017-10-02 PROCEDURE — 81001 URINALYSIS AUTO W/SCOPE: CPT

## 2017-10-02 PROCEDURE — 6360000002 HC RX W HCPCS: Performed by: HOSPITALIST

## 2017-10-02 PROCEDURE — 84460 ALANINE AMINO (ALT) (SGPT): CPT

## 2017-10-02 PROCEDURE — 74000 XR ABDOMEN LIMITED (KUB): CPT

## 2017-10-02 PROCEDURE — 6370000000 HC RX 637 (ALT 250 FOR IP): Performed by: EMERGENCY MEDICINE

## 2017-10-02 PROCEDURE — 87040 BLOOD CULTURE FOR BACTERIA: CPT

## 2017-10-02 PROCEDURE — 94770 HC ETCO2 MONITOR DAILY: CPT

## 2017-10-02 PROCEDURE — 83605 ASSAY OF LACTIC ACID: CPT

## 2017-10-02 PROCEDURE — 85025 COMPLETE CBC W/AUTO DIFF WBC: CPT

## 2017-10-02 PROCEDURE — S0028 INJECTION, FAMOTIDINE, 20 MG: HCPCS | Performed by: EMERGENCY MEDICINE

## 2017-10-02 PROCEDURE — 94640 AIRWAY INHALATION TREATMENT: CPT

## 2017-10-02 PROCEDURE — 82247 BILIRUBIN TOTAL: CPT

## 2017-10-02 PROCEDURE — 0BH17EZ INSERTION OF ENDOTRACHEAL AIRWAY INTO TRACHEA, VIA NATURAL OR ARTIFICIAL OPENING: ICD-10-PCS | Performed by: EMERGENCY MEDICINE

## 2017-10-02 PROCEDURE — 87070 CULTURE OTHR SPECIMN AEROBIC: CPT

## 2017-10-02 PROCEDURE — 2000000000 HC ICU R&B

## 2017-10-02 PROCEDURE — 6370000000 HC RX 637 (ALT 250 FOR IP): Performed by: HOSPITALIST

## 2017-10-02 PROCEDURE — 87899 AGENT NOS ASSAY W/OPTIC: CPT

## 2017-10-02 PROCEDURE — 80076 HEPATIC FUNCTION PANEL: CPT

## 2017-10-02 PROCEDURE — 2580000003 HC RX 258: Performed by: EMERGENCY MEDICINE

## 2017-10-02 PROCEDURE — 93306 TTE W/DOPPLER COMPLETE: CPT

## 2017-10-02 PROCEDURE — 94762 N-INVAS EAR/PLS OXIMTRY CONT: CPT

## 2017-10-02 PROCEDURE — 94002 VENT MGMT INPAT INIT DAY: CPT

## 2017-10-02 PROCEDURE — 2580000003 HC RX 258: Performed by: HOSPITALIST

## 2017-10-02 PROCEDURE — 6360000002 HC RX W HCPCS: Performed by: INTERNAL MEDICINE

## 2017-10-02 PROCEDURE — 86738 MYCOPLASMA ANTIBODY: CPT

## 2017-10-02 PROCEDURE — 87086 URINE CULTURE/COLONY COUNT: CPT

## 2017-10-02 PROCEDURE — 6370000000 HC RX 637 (ALT 250 FOR IP): Performed by: INTERNAL MEDICINE

## 2017-10-02 PROCEDURE — 84100 ASSAY OF PHOSPHORUS: CPT

## 2017-10-02 PROCEDURE — 84484 ASSAY OF TROPONIN QUANT: CPT

## 2017-10-02 PROCEDURE — 2500000003 HC RX 250 WO HCPCS: Performed by: HOSPITALIST

## 2017-10-02 PROCEDURE — 71010 XR CHEST PORTABLE: CPT

## 2017-10-02 PROCEDURE — 51701 INSERT BLADDER CATHETER: CPT

## 2017-10-02 PROCEDURE — 82248 BILIRUBIN DIRECT: CPT

## 2017-10-02 PROCEDURE — 5A1945Z RESPIRATORY VENTILATION, 24-96 CONSECUTIVE HOURS: ICD-10-PCS | Performed by: INTERNAL MEDICINE

## 2017-10-02 PROCEDURE — 83880 ASSAY OF NATRIURETIC PEPTIDE: CPT

## 2017-10-02 PROCEDURE — 94761 N-INVAS EAR/PLS OXIMETRY MLT: CPT

## 2017-10-02 PROCEDURE — 80048 BASIC METABOLIC PNL TOTAL CA: CPT

## 2017-10-02 PROCEDURE — 2580000003 HC RX 258: Performed by: PHYSICIAN ASSISTANT

## 2017-10-02 PROCEDURE — 83735 ASSAY OF MAGNESIUM: CPT

## 2017-10-02 RX ORDER — SENNA PLUS 8.6 MG/1
1 TABLET ORAL NIGHTLY
Status: DISCONTINUED | OUTPATIENT
Start: 2017-10-02 | End: 2017-10-07 | Stop reason: HOSPADM

## 2017-10-02 RX ORDER — FUROSEMIDE 10 MG/ML
20 INJECTION INTRAMUSCULAR; INTRAVENOUS 2 TIMES DAILY
Status: DISCONTINUED | OUTPATIENT
Start: 2017-10-02 | End: 2017-10-05

## 2017-10-02 RX ORDER — PROPOFOL 10 MG/ML
INJECTION, EMULSION INTRAVENOUS
Status: COMPLETED
Start: 2017-10-02 | End: 2017-10-02

## 2017-10-02 RX ORDER — ONDANSETRON 2 MG/ML
4 INJECTION INTRAMUSCULAR; INTRAVENOUS EVERY 6 HOURS PRN
Status: DISCONTINUED | OUTPATIENT
Start: 2017-10-02 | End: 2017-10-07 | Stop reason: HOSPADM

## 2017-10-02 RX ORDER — METHYLPREDNISOLONE SODIUM SUCCINATE 125 MG/2ML
80 INJECTION, POWDER, LYOPHILIZED, FOR SOLUTION INTRAMUSCULAR; INTRAVENOUS EVERY 8 HOURS
Status: DISCONTINUED | OUTPATIENT
Start: 2017-10-02 | End: 2017-10-02

## 2017-10-02 RX ORDER — ALBUTEROL SULFATE 2.5 MG/3ML
2.5 SOLUTION RESPIRATORY (INHALATION) 2 TIMES DAILY
Status: DISCONTINUED | OUTPATIENT
Start: 2017-10-03 | End: 2017-10-02

## 2017-10-02 RX ORDER — PROPOFOL 10 MG/ML
20 INJECTION, EMULSION INTRAVENOUS
Status: DISCONTINUED | OUTPATIENT
Start: 2017-10-02 | End: 2017-10-02 | Stop reason: HOSPADM

## 2017-10-02 RX ORDER — SUCCINYLCHOLINE CHLORIDE 20 MG/ML
INJECTION INTRAMUSCULAR; INTRAVENOUS
Status: COMPLETED | OUTPATIENT
Start: 2017-10-02 | End: 2017-10-02

## 2017-10-02 RX ORDER — SODIUM CHLORIDE 0.9 % (FLUSH) 0.9 %
10 SYRINGE (ML) INJECTION PRN
Status: DISCONTINUED | OUTPATIENT
Start: 2017-10-02 | End: 2017-10-07 | Stop reason: HOSPADM

## 2017-10-02 RX ORDER — SODIUM CHLORIDE 9 MG/ML
INJECTION, SOLUTION INTRAVENOUS CONTINUOUS
Status: DISCONTINUED | OUTPATIENT
Start: 2017-10-02 | End: 2017-10-07 | Stop reason: HOSPADM

## 2017-10-02 RX ORDER — CHLORHEXIDINE GLUCONATE 0.12 MG/ML
15 RINSE ORAL 2 TIMES DAILY
Status: DISCONTINUED | OUTPATIENT
Start: 2017-10-02 | End: 2017-10-02

## 2017-10-02 RX ORDER — DEXTROSE MONOHYDRATE 25 G/50ML
12.5 INJECTION, SOLUTION INTRAVENOUS PRN
Status: DISCONTINUED | OUTPATIENT
Start: 2017-10-02 | End: 2017-10-07 | Stop reason: HOSPADM

## 2017-10-02 RX ORDER — MIDAZOLAM HYDROCHLORIDE 1 MG/ML
INJECTION INTRAMUSCULAR; INTRAVENOUS
Status: COMPLETED | OUTPATIENT
Start: 2017-10-02 | End: 2017-10-02

## 2017-10-02 RX ORDER — FENTANYL CITRATE 50 UG/ML
50 INJECTION, SOLUTION INTRAMUSCULAR; INTRAVENOUS
Status: DISCONTINUED | OUTPATIENT
Start: 2017-10-02 | End: 2017-10-03

## 2017-10-02 RX ORDER — NICOTINE POLACRILEX 4 MG
15 LOZENGE BUCCAL PRN
Status: DISCONTINUED | OUTPATIENT
Start: 2017-10-02 | End: 2017-10-07 | Stop reason: HOSPADM

## 2017-10-02 RX ORDER — AZITHROMYCIN 500 MG/1
INJECTION, POWDER, LYOPHILIZED, FOR SOLUTION INTRAVENOUS
Status: DISCONTINUED
Start: 2017-10-02 | End: 2017-10-02 | Stop reason: HOSPADM

## 2017-10-02 RX ORDER — DEXTROSE MONOHYDRATE 50 MG/ML
INJECTION, SOLUTION INTRAVENOUS
Status: DISCONTINUED
Start: 2017-10-02 | End: 2017-10-02 | Stop reason: HOSPADM

## 2017-10-02 RX ORDER — CEFTRIAXONE 1 G/1
INJECTION, POWDER, FOR SOLUTION INTRAMUSCULAR; INTRAVENOUS
Status: DISCONTINUED
Start: 2017-10-02 | End: 2017-10-02 | Stop reason: HOSPADM

## 2017-10-02 RX ORDER — SODIUM CHLORIDE 0.9 % (FLUSH) 0.9 %
10 SYRINGE (ML) INJECTION EVERY 12 HOURS SCHEDULED
Status: DISCONTINUED | OUTPATIENT
Start: 2017-10-02 | End: 2017-10-07 | Stop reason: HOSPADM

## 2017-10-02 RX ORDER — IPRATROPIUM BROMIDE AND ALBUTEROL SULFATE 2.5; .5 MG/3ML; MG/3ML
1 SOLUTION RESPIRATORY (INHALATION) 2 TIMES DAILY
Status: DISCONTINUED | OUTPATIENT
Start: 2017-10-02 | End: 2017-10-02

## 2017-10-02 RX ORDER — IPRATROPIUM BROMIDE AND ALBUTEROL SULFATE 2.5; .5 MG/3ML; MG/3ML
1 SOLUTION RESPIRATORY (INHALATION) 4 TIMES DAILY
Status: DISCONTINUED | OUTPATIENT
Start: 2017-10-02 | End: 2017-10-06

## 2017-10-02 RX ORDER — DEXTROSE MONOHYDRATE 50 MG/ML
100 INJECTION, SOLUTION INTRAVENOUS PRN
Status: DISCONTINUED | OUTPATIENT
Start: 2017-10-02 | End: 2017-10-07 | Stop reason: HOSPADM

## 2017-10-02 RX ORDER — METHYLPREDNISOLONE SODIUM SUCCINATE 40 MG/ML
40 INJECTION, POWDER, LYOPHILIZED, FOR SOLUTION INTRAMUSCULAR; INTRAVENOUS EVERY 12 HOURS
Status: DISCONTINUED | OUTPATIENT
Start: 2017-10-02 | End: 2017-10-03

## 2017-10-02 RX ORDER — ACETAMINOPHEN 325 MG/1
650 TABLET ORAL EVERY 4 HOURS PRN
Status: DISCONTINUED | OUTPATIENT
Start: 2017-10-02 | End: 2017-10-07 | Stop reason: HOSPADM

## 2017-10-02 RX ORDER — KETAMINE HYDROCHLORIDE 50 MG/ML
INJECTION, SOLUTION, CONCENTRATE INTRAMUSCULAR; INTRAVENOUS
Status: COMPLETED | OUTPATIENT
Start: 2017-10-02 | End: 2017-10-02

## 2017-10-02 RX ORDER — PROPOFOL 10 MG/ML
10 INJECTION, EMULSION INTRAVENOUS
Status: DISCONTINUED | OUTPATIENT
Start: 2017-10-02 | End: 2017-10-04

## 2017-10-02 RX ORDER — IPRATROPIUM BROMIDE AND ALBUTEROL SULFATE 2.5; .5 MG/3ML; MG/3ML
1 SOLUTION RESPIRATORY (INHALATION) 4 TIMES DAILY
Status: DISCONTINUED | OUTPATIENT
Start: 2017-10-02 | End: 2017-10-02

## 2017-10-02 RX ORDER — ALBUTEROL SULFATE 2.5 MG/3ML
2.5 SOLUTION RESPIRATORY (INHALATION) 2 TIMES DAILY
Status: DISCONTINUED | OUTPATIENT
Start: 2017-10-03 | End: 2017-10-04

## 2017-10-02 RX ORDER — DOCUSATE SODIUM 100 MG/1
100 CAPSULE, LIQUID FILLED ORAL DAILY
Status: DISCONTINUED | OUTPATIENT
Start: 2017-10-02 | End: 2017-10-03

## 2017-10-02 RX ADMIN — FENTANYL CITRATE 50 MCG: 50 INJECTION, SOLUTION INTRAMUSCULAR; INTRAVENOUS at 23:44

## 2017-10-02 RX ADMIN — SUCCINYLCHOLINE CHLORIDE 200 MG: 20 INJECTION, SOLUTION INTRAMUSCULAR; INTRAVENOUS at 02:12

## 2017-10-02 RX ADMIN — PROPOFOL 50 MCG/KG/MIN: 10 INJECTION, EMULSION INTRAVENOUS at 07:00

## 2017-10-02 RX ADMIN — PROPOFOL 10 MCG/KG/MIN: 10 INJECTION, EMULSION INTRAVENOUS at 07:58

## 2017-10-02 RX ADMIN — IPRATROPIUM BROMIDE AND ALBUTEROL SULFATE 1 AMPULE: .5; 3 SOLUTION RESPIRATORY (INHALATION) at 16:35

## 2017-10-02 RX ADMIN — SODIUM CHLORIDE: 9 INJECTION, SOLUTION INTRAVENOUS at 10:29

## 2017-10-02 RX ADMIN — METHYLPREDNISOLONE SODIUM SUCCINATE 40 MG: 40 INJECTION, POWDER, FOR SOLUTION INTRAMUSCULAR; INTRAVENOUS at 20:18

## 2017-10-02 RX ADMIN — VANCOMYCIN HYDROCHLORIDE 1500 MG: 10 INJECTION, POWDER, LYOPHILIZED, FOR SOLUTION INTRAVENOUS at 08:13

## 2017-10-02 RX ADMIN — IPRATROPIUM BROMIDE AND ALBUTEROL SULFATE 1 AMPULE: .5; 3 SOLUTION RESPIRATORY (INHALATION) at 07:27

## 2017-10-02 RX ADMIN — IPRATROPIUM BROMIDE AND ALBUTEROL SULFATE 1 AMPULE: .5; 3 SOLUTION RESPIRATORY (INHALATION) at 11:22

## 2017-10-02 RX ADMIN — METOPROLOL TARTRATE 25 MG: 25 TABLET ORAL at 00:00

## 2017-10-02 RX ADMIN — PROPOFOL 35 MCG/KG/MIN: 10 INJECTION, EMULSION INTRAVENOUS at 18:27

## 2017-10-02 RX ADMIN — PROPOFOL 20 MCG/KG/MIN: 10 INJECTION, EMULSION INTRAVENOUS at 02:25

## 2017-10-02 RX ADMIN — VANCOMYCIN HYDROCHLORIDE 1500 MG: 10 INJECTION, POWDER, LYOPHILIZED, FOR SOLUTION INTRAVENOUS at 22:35

## 2017-10-02 RX ADMIN — DOCUSATE SODIUM 100 MG: 100 CAPSULE, LIQUID FILLED ORAL at 10:30

## 2017-10-02 RX ADMIN — ENOXAPARIN SODIUM 30 MG: 30 INJECTION SUBCUTANEOUS at 10:30

## 2017-10-02 RX ADMIN — ENOXAPARIN SODIUM 30 MG: 30 INJECTION SUBCUTANEOUS at 20:19

## 2017-10-02 RX ADMIN — PROPOFOL 50 MCG/KG/MIN: 10 INJECTION, EMULSION INTRAVENOUS at 03:50

## 2017-10-02 RX ADMIN — INSULIN LISPRO 2 UNITS: 100 INJECTION, SOLUTION INTRAVENOUS; SUBCUTANEOUS at 16:30

## 2017-10-02 RX ADMIN — FENTANYL CITRATE 50 MCG: 50 INJECTION, SOLUTION INTRAMUSCULAR; INTRAVENOUS at 08:15

## 2017-10-02 RX ADMIN — METHYLPREDNISOLONE SODIUM SUCCINATE 125 MG: 125 INJECTION, POWDER, FOR SOLUTION INTRAMUSCULAR; INTRAVENOUS at 00:00

## 2017-10-02 RX ADMIN — Medication 10 ML: at 08:07

## 2017-10-02 RX ADMIN — IPRATROPIUM BROMIDE AND ALBUTEROL SULFATE 1 AMPULE: .5; 3 SOLUTION RESPIRATORY (INHALATION) at 19:34

## 2017-10-02 RX ADMIN — VANCOMYCIN HYDROCHLORIDE 1500 MG: 10 INJECTION, POWDER, LYOPHILIZED, FOR SOLUTION INTRAVENOUS at 15:49

## 2017-10-02 RX ADMIN — ALBUTEROL SULFATE 2.5 MG: 2.5 SOLUTION RESPIRATORY (INHALATION) at 23:14

## 2017-10-02 RX ADMIN — PROPOFOL 20 MCG/KG/MIN: 10 INJECTION, EMULSION INTRAVENOUS at 12:07

## 2017-10-02 RX ADMIN — CEFTRIAXONE 1 G: 1 INJECTION, POWDER, FOR SOLUTION INTRAMUSCULAR; INTRAVENOUS at 03:34

## 2017-10-02 RX ADMIN — Medication 10 ML: at 20:19

## 2017-10-02 RX ADMIN — FENTANYL CITRATE 50 MCG: 50 INJECTION, SOLUTION INTRAMUSCULAR; INTRAVENOUS at 12:21

## 2017-10-02 RX ADMIN — FENTANYL CITRATE 50 MCG: 50 INJECTION, SOLUTION INTRAMUSCULAR; INTRAVENOUS at 16:09

## 2017-10-02 RX ADMIN — PROPOFOL 35 MCG/KG/MIN: 10 INJECTION, EMULSION INTRAVENOUS at 21:59

## 2017-10-02 RX ADMIN — INSULIN LISPRO 1 UNITS: 100 INJECTION, SOLUTION INTRAVENOUS; SUBCUTANEOUS at 20:16

## 2017-10-02 RX ADMIN — FAMOTIDINE 20 MG: 10 INJECTION, SOLUTION INTRAVENOUS at 20:18

## 2017-10-02 RX ADMIN — Medication 10 ML: at 00:00

## 2017-10-02 RX ADMIN — CEFTRIAXONE SODIUM 2 G: 2 INJECTION, POWDER, FOR SOLUTION INTRAMUSCULAR; INTRAVENOUS at 07:27

## 2017-10-02 RX ADMIN — SENNA 8.6 MG: 8.6 TABLET, COATED ORAL at 20:18

## 2017-10-02 RX ADMIN — SODIUM PHOSPHATE, MONOBASIC, MONOHYDRATE 26.43 MMOL: 276; 142 INJECTION, SOLUTION INTRAVENOUS at 22:05

## 2017-10-02 RX ADMIN — PROPOFOL 30 MCG/KG/MIN: 10 INJECTION, EMULSION INTRAVENOUS at 15:47

## 2017-10-02 RX ADMIN — METHYLPREDNISOLONE SODIUM SUCCINATE 80 MG: 125 INJECTION, POWDER, FOR SOLUTION INTRAMUSCULAR; INTRAVENOUS at 07:44

## 2017-10-02 RX ADMIN — FAMOTIDINE 20 MG: 10 INJECTION, SOLUTION INTRAVENOUS at 08:07

## 2017-10-02 RX ADMIN — FENTANYL CITRATE 50 MCG: 50 INJECTION, SOLUTION INTRAMUSCULAR; INTRAVENOUS at 18:27

## 2017-10-02 RX ADMIN — PROPOFOL 1 MCG/KG/MIN: 10 INJECTION, EMULSION INTRAVENOUS at 02:18

## 2017-10-02 RX ADMIN — FENTANYL CITRATE 50 MCG: 50 INJECTION, SOLUTION INTRAMUSCULAR; INTRAVENOUS at 20:23

## 2017-10-02 RX ADMIN — FUROSEMIDE 20 MG: 10 INJECTION, SOLUTION INTRAVENOUS at 18:30

## 2017-10-02 RX ADMIN — SIMVASTATIN 20 MG: 20 TABLET, FILM COATED ORAL at 00:00

## 2017-10-02 RX ADMIN — INSULIN LISPRO 2 UNITS: 100 INJECTION, SOLUTION INTRAVENOUS; SUBCUTANEOUS at 07:51

## 2017-10-02 RX ADMIN — ALBUTEROL SULFATE 2.5 MG: 2.5 SOLUTION RESPIRATORY (INHALATION) at 00:18

## 2017-10-02 RX ADMIN — FUROSEMIDE 20 MG: 10 INJECTION, SOLUTION INTRAVENOUS at 10:30

## 2017-10-02 RX ADMIN — KETAMINE HYDROCHLORIDE 150 MG: 50 INJECTION INTRAMUSCULAR; INTRAVENOUS at 02:04

## 2017-10-02 RX ADMIN — MIDAZOLAM HYDROCHLORIDE 2 MG: 1 INJECTION, SOLUTION INTRAMUSCULAR; INTRAVENOUS at 02:07

## 2017-10-02 ASSESSMENT — PULMONARY FUNCTION TESTS
PIF_VALUE: 39
PIF_VALUE: 33
PIF_VALUE: 36
PIF_VALUE: 30
PIF_VALUE: 33
PIF_VALUE: 41
PIF_VALUE: 37
PIF_VALUE: 33
PIF_VALUE: 31
PIF_VALUE: 33
PIF_VALUE: 39
PIF_VALUE: 31

## 2017-10-02 NOTE — PROGRESS NOTES
Smoking Cessation - topics covered   []  Health Risks  []  Benefits of Quitting   []  Smoking Cessation  []  Patient has no history of tobacco use  [x]  Patient is former smoker. Patient quit in 2000. [x]  No need for tobacco cessation education. []  Booklet given  []  Patient verbalizes understanding. []  Patient denies need for tobacco cessation education.   Adam Dalal  7:59 AM

## 2017-10-02 NOTE — ED PROVIDER NOTES
eMERGENCY dEPARTMENT eNCOUnter      Pt Name: Teresa Camacho  MRN: [id number]  [place] 1975  Date of evaluation: 10/1/2017      CHIEF COMPLAINT       Chief Complaint   Patient presents with    Shortness of Breath     x 2 weeks          HISTORY OF PRESENT ILLNESS    Teresa Camacho is a 38 y.o. male who presents To the emergency department complaining of several day history of dizziness and shortness of breath. The patient is quite overweight is a diabetic and has a history of COPD. He denies any chest pain nausea vomiting or diaphoresis. He is afebrile here today. He is not on home O2. His sats on room air however were running about 77%. REVIEW OF SYSTEMS       Review of Systems   Constitutional: Positive for malaise/fatigue. HENT: Negative. Eyes: Negative. Respiratory: Positive for shortness of breath. Cardiovascular: Positive for leg swelling. Gastrointestinal: Negative. Genitourinary: Negative. Musculoskeletal:        Positive for joint swelling   Skin: Negative. Neurological: Negative. Psychiatric/Behavioral: Negative. PAST MEDICAL HISTORY    has a past medical history of Anxiety; Arthritis; Asthma; COPD (chronic obstructive pulmonary disease) ([address] Diabetes mellitus ([address] GERD (gastroesophageal reflux disease); Hypertension; Morbid obesity ([address] Peripheral edema; and Thoracic ascending aortic aneurysm ([address] SURGICAL HISTORY      has a past surgical history that includes Splenectomy; Anterior cruciate ligament repair (Left); Arm Surgery (Right); and Anterior cruciate ligament repair (Left). CURRENT MEDICATIONS       Previous Medications    ASPIRIN EC 81 MG EC TABLET    Take 1 tablet by mouth daily    CPAP MACHINE MISC    by Does not apply route With O2 at 4 LPM. Pressure of 13.     FLUTICASONE (FLONASE) 50 MCG/ACT NASAL SPRAY    1 spray by Nasal route daily as needed     LISINOPRIL (PRINIVIL;ZESTRIL) 5 MG TABLET    TAKE 1 TABLET BY MOUTH DAILY    METFORMIN (GLUCOPHAGE) 500 MG TABLET    TAKE 1 TABLET BY MOUTH TWICE DAILY WITH MEALS    METOPROLOL TARTRATE (LOPRESSOR) 25 MG TABLET    TAKE 1 TABLET BY MOUTH TWICE DAILY    MOMETASONE-FORMOTEROL (DULERA) 200-5 MCG/ACT INHALER    Inhale 2 puffs into the lungs 2 times daily    PANTOPRAZOLE (PROTONIX) 40 MG TABLET    TAKE 1 TABLET BY MOUTH DAILY    PROAIR  (90 BASE) MCG/ACT INHALER    INHALE 2 PUFFS BY MOUTH INTO LUNGS EVERY 6 HOURS AS NEEDED FOR WHEEZING    SERTRALINE (ZOLOFT) 25 MG TABLET    Take 25 mg by mouth daily    SIMVASTATIN (ZOCOR) 20 MG TABLET    Take 1 tablet by mouth nightly       ALLERGIES     is allergic to bee venom and other. FAMILY HISTORY     indicated that his mother is alive. He indicated that his father is alive. He indicated that both of his sisters are alive. He indicated that all of his seven brothers are alive. family history includes Heart Disease in his father. SOCIAL HISTORY      reports that he quit smoking about 17 years ago. His smoking use included Cigarettes. He has a 5.00 pack-year smoking history. He has never used smokeless tobacco. He reports that he drinks alcohol. He reports that he does not use illicit drugs. PHYSICAL EXAM     INITIAL VITALS:  height is 6' (1.829 m) and weight is 320 lb (145.2 kg) (abnormal). His blood pressure is 151/89 (abnormal) and his pulse is 99. His respiration is 14 and oxygen saturation is 90%. Physical Exam   Constitutional: He is oriented to person, place, and time. He appears well-developed. He appears distressed. HENT:   Head: Normocephalic and atraumatic. Eyes: EOM are normal. Pupils are equal, round, and reactive to light. Neck: Normal range of motion. Neck supple. No JVD present. Cardiovascular: Regular rhythm, normal heart sounds and intact distal pulses. Exam reveals no gallop and no friction rub. No murmur heard. Pulmonary/Chest: He is in respiratory distress. He has wheezes. He has no rales.  He exhibits no 1 - 8 %    Basophils 0 0 - 2 %    Segs Absolute 9.20 (H) 1.8 - 7.7 k/uL    Absolute Lymph # 2.80 1.0 - 4.8 k/uL    Absolute Mono # 1.40 (H) 0.1 - 1.2 k/uL    Absolute Eos # 0.10 0.0 - 0.4 k/uL    Basophils # 0.10 0.0 - 0.2 k/uL   CK   Result Value Ref Range    Total  39 - 308 U/L   CK MB   Result Value Ref Range    CK-MB 5.0 <10.5 ng/mL   Myoglobin, Serum   Result Value Ref Range    Myoglobin 53 28 - 72 ng/mL   Protime-INR   Result Value Ref Range    Protime 11.8 (H) 9.4 - 11.3 sec    INR 1.1    APTT   Result Value Ref Range    PTT 25.9 (L) 27.0 - 35.0 sec   Troponin   Result Value Ref Range    Troponin T <0.03 <0.03 ng/mL    Troponin Interp         EKG 12 Lead   Result Value Ref Range    Ventricular Rate 96 BPM    Atrial Rate 96 BPM    P-R Interval 160 ms    QRS Duration 84 ms    Q-T Interval 328 ms    QTc Calculation (Bazett) 414 ms    P Axis 37 degrees    R Axis 15 degrees    T Axis 1 degrees       Not indicated unless otherwise documented above    RADIOLOGY:   I reviewed the radiologist interpretations:  XR CHEST STANDARD (2 VW)   Preliminary Result   No focal airspace disease. Cardiomegaly and pulmonary venous congestion. Xr Chest Standard (2 Vw)    Result Date: 10/1/2017  EXAMINATION: TWO VIEWS OF THE CHEST, 10/1/2017 9:34 pm COMPARISON: None HISTORY: ORDERING SYSTEM PROVIDED HISTORY: CHF TECHNOLOGIST PROVIDED HISTORY: Reason for exam:->CHF Ordering Physician Provided Reason for Exam: Increased SOB today, hx asthma, COPD, former smoker Acuity: Acute Type of Exam: Initial FINDINGS: Cardiomegaly. Indistinct pulmonary vasculature. No focal consolidation. No significant pleural effusion. No pneumothorax. No focal airspace disease. Cardiomegaly and pulmonary venous congestion.      Not indicated unless otherwise documented above    EMERGENCY DEPARTMENT COURSE:     The patient was given the following medications:  Orders Placed This Encounter   Medications    ipratropium-albuterol voice recognition program.  Efforts were made to edit the dictations but occasionally words are mis-transcribed.)    Veliz MD  Attending Emergency Physician               Opal Garcia MD  10/02/17 [id number]

## 2017-10-02 NOTE — PLAN OF CARE
Problem: OXYGENATION/RESPIRATORY FUNCTION  Goal: Patient will maintain patent airway  Outcome: Ongoing  Goal: Patient will achieve/maintain normal respiratory rate/effort  Respiratory rate and effort will be within normal limits for the patient  Outcome: Ongoing    Problem: MECHANICAL VENTILATION  Goal: Patient will maintain patent airway  Outcome: Ongoing  Goal: Oral health is maintained or improved  Outcome: Ongoing  Goal: ET tube will be managed safely  Outcome: Ongoing  Goal: Ability to express needs and understand communication  Outcome: Ongoing    Problem: RESPIRATORY  Intervention: Respiratory assessment  Ventilator Bronchodilator assessment     Breath sounds: diminished  Inspiratory Pressure: 31  Plateau Pressure: 26     Patient assessed at level 3              [ ]    Bronchodilator Assessment     BRONCHODILATOR ASSESSMENT SCORE  Score 0 (Home) 1 2 3 4   Breath Sounds    [ ]  Chronic Ventilator: Patient at baseline [ ]  Mild Wheezes/ Clear [ ]  Intermittent wheezes with good air entry [X]  Bilateral/unilateral wheezing with diminished air entry [ ]  Insp/Exp wheeze and/or poor aeration   Ventilator Pressures   [ ]  Chronic Ventilator [ ]  Insp. Pressure less than 25 cm H20 [ ]  Insp. Pressure less than 25 cm H20 [X]  Insp. Pressure exceeds 25 cm H20 [X]  Insp.  Pressure exceeds 30 cm H20   Plateau Pressure [ ]  NA    [ ]  Plateau Pressure less than 4  [X]  Plateau Pressure less than or equal to 5 [X]  Plateau Pressure greater than or equal to 6 [ ]  Plateau Pressure greater than or equal to 8         YE JUAREZ  8:08 AM

## 2017-10-02 NOTE — LETTER
[facility]  [address] [place] [address]  Phone: [phone]             October 7, 2017    Patient: Mary John   YOB: 1975   Date of Visit: 10/2/2017       To Whom It May Concern:    Clementine Gamino was seen and treated in our facility  beginning 10/2/2017 until 10/7/2017. He can not return to work until he is cleared by his personal care physician.      Sincerely,       Stacy Luque RN         Signature:__________________________________

## 2017-10-02 NOTE — PLAN OF CARE
Problem: OXYGENATION/RESPIRATORY FUNCTION  Goal: Patient will maintain patent airway  Outcome: Met This Shift  Goal: Patient will achieve/maintain normal respiratory rate/effort  Respiratory rate and effort will be within normal limits for the patient   Outcome: Met This Shift    Problem: MECHANICAL VENTILATION  Goal: Patient will maintain patent airway  Outcome: Met This Shift  Goal: Oral health is maintained or improved  Outcome: Met This Shift  Goal: ET tube will be managed safely  Outcome: Met This Shift  Goal: Ability to express needs and understand communication  Outcome: Met This Shift  Goal: Mobility/activity is maintained at optimum level for patient  Outcome: Met This Shift    Problem: Risk for Impaired Skin Integrity  Goal: Tissue integrity - skin and mucous membranes  Structural intactness and normal physiological function of skin and  mucous membranes.    Outcome: Met This Shift    Problem: Falls - Risk of  Goal: Absence of falls  Outcome: Met This Shift    Problem: Restraint Use - Nonviolent/Non-Self-Destructive Behavior:  Goal: Absence of restraint indications  Absence of restraint indications   Outcome: Not Met This Shift  Goal: Absence of restraint-related injury  Absence of restraint-related injury   Outcome: Met This Shift    Problem: Nutrition  Goal: Optimal nutrition therapy  Outcome: Met This Shift

## 2017-10-02 NOTE — H&P
Critical Care - History and Physical Examination    Patient's name:  Nury Bowen  Medical Record Number: [id number]  Patient's account/billing number: [de-identified]  Patient's YOB: 1975  Age: 38 y.o. Date of Admission: 10/2/2017  5:28 AM  Date of History and Physical Examination: 10/2/2017      Primary Care Physician: Montana Clay MD  Attending Physician:  [x]Dr. Holger Mora  []Dr. Sabrina Katz  []Dr. Serafin Mcfarland  []Dr. Zohaib Paige    Code Status: Full Code    Chief complaint: shortness of breath    HISTORY OF PRESENT ILLNESS:   History was obtained from chart review and unobtainable from patient due to  Patient being intubated and sedated and mechanically ventilated. Nury Bowen is a 38 y. o.  who comes from [place]. We're told the patient presented to the ER with progressive shortness of breath and feeling weak all over for the last 2 weeks. He had also complained of scrotal edema peripheral edema for the last few weeks  He denied any chest pain, cough or fever. Patient was a poor historian on arrival, According to the ER notes. He did have sats in the 70s. He was immediately placed on nasal cannula and PCO2 was 76. He was then transitioned to BiPAP. He had wheezes so he was given 125 mg Solu-Medrol, DuoNeb, albuterol. He also got 40 mg of IV Lasix. He was also treated with zithromax and Rocephin and admitted to the hospital at [place] to the internal medicine service. On the internal medicine service, he became more confused, trying to rip off his BiPAP and CO2 was obtained and it had increased from 76 in the ER to 118 on the floor. The decision was then made to intubate the patient and transfer him here. No Other history is available at this time because patient is currently intubated. Upon chart review, it appears that patient was admitted for COPD exacerbation in March of this year and was discharged with zithromax and ceftin.  His Uncle Was also present at bedside an hour after patient arrived to Encounters:   10/02/17 (!) 364 lb 3.2 oz (165.2 kg)   10/01/17 (!) 362 lb 8 oz (164.4 kg)   08/07/17 (!) 355 lb (161 kg)       Body Mass Index : Body mass index is 49.39 kg/(m^2). PHYSICAL EXAMINATION :    General appearance - intubated, sedated and mechanically ventilated  Eyes - pupils equal and reactive  Mouth - mucous membranes moist, pharynx normal without lesions  Neck - supple, no significant adenopathy  Chest - Rales to bibasilar area with rhonchi. No wheezes noted  Heart - normal rate, regular rhythm, normal S1, S2, no murmurs, rubs, clicks or gallops  Abdomen - soft, nontender, nondistended, no masses or organomegaly. There is a large area of erythema and warmth on the anterior abdominal wall  Extremities - peripheral pulses normal, +3 pedal edema and scrotal edema, no clubbing or cyanosis  Skin - normal coloration and turgor    Any additional physical findings:      Laboratory findings:-    CBC:   Recent Labs      10/01/17   2055   WBC  13.6*   HGB  15.2   PLT  234     BMP:    Recent Labs      10/01/17   2055   NA  143   K  4.7   CL  99   CO2  37*   BUN  25*   CREATININE  0.86   GLUCOSE  190*     S. Calcium:  Recent Labs      10/01/17   2055   CALCIUM  8.9     S. Ionized Calcium:No results for input(s): IONCA in the last 72 hours. S. Magnesium:No results for input(s): MG in the last 72 hours. S. Phosphorus:No results for input(s): PHOS in the last 72 hours. S. Glucose:  Recent Labs      10/01/17   2346   POCGLU  124*     Glycosylated hemoglobin A1C: No results for input(s): LABA1C in the last 72 hours. INR:   Recent Labs      10/01/17   2055   INR  1.1     Hepatic functions: No results for input(s): ALKPHOS, ALT, AST, PROT, BILITOT, BILIDIR, LABALBU in the last 72 hours. Pancreatic functions:No results for input(s): LACTA, AMYLASE in the last 72 hours. S. Lactic Acid: No results for input(s): LACTA in the last 72 hours.   Cardiac enzymes:  Recent Labs      10/01/17   2055   CKTOTAL  142 CKMB  5.0     BNP:No results for input(s): BNP in the last 72 hours. Lipid profile: No results for input(s): CHOL, TRIG, HDL, LDLCALC in the last 72 hours. Invalid input(s): LDL  Blood Gases: No results found for: PH, PCO2, PO2, HCO3, O2SAT  Thyroid functions: No results found for: TSH     Urinalysis:     Microbiology:    Cultures during this admission:     Blood cultures:                 [x] pending      [] Negative             []  Positive (Details:  )  Urine Culture:                   [x] pending        [] Negative             []  Positive (Details:  )  Sputum Culture:               [x] pending         [] Negative             []  Positive (Details:  )   Endotracheal aspirate:     [] None drawn       [] Negative             []  Positive (Details:  )         -----------------------------------------------------------------  Radiological reports:    EXAMINATION:   SINGLE VIEW OF THE CHEST       10/2/2017 2:29 am       COMPARISON:   October 1, 2017       HISTORY:   ORDERING SYSTEM PROVIDED HISTORY: post intubation   TECHNOLOGIST PROVIDED HISTORY:   Reason for exam:->post intubation   Ordering Physician Provided Reason for Exam: post intubation   Acuity: Acute   Type of Exam: Subsequent/Follow-up       FINDINGS:   Enteric tube tip terminates at the level of the clavicles, approximately 5 cm   above the joan.  Enlarged cardiomediastinal silhouette is stable.    Indistinct pulmonary vascularity suggests mild vascular congestion.  Low lung   volumes.  No lobar lung consolidation.  No pneumothorax or significant   pleural effusion.  Osseous structures are unchanged.           Impression   Endotracheal tube in appropriate position.       Stable exam.           Recent Cardiac Catheterization summary:   (See actual reports for details)    Electrocardiogram:     Last Echocardiogram findings:   March 31 2017 - EF 55-60%       Assessment and Plan       Active Hospital Problems    Diagnosis Date Noted    Acute on chronic not charted yet. The patient has made at least 900 mL since given Lasix  - Continue to monitor  - IVF: NS @ 10 / hr for med; pt already in fluid overload and will need more diuresis  - will discuss continuing Lasix 20 mg twice a day IV    ID/ HEME:  - Afebrile   - history of asplenia. - Vancomycin for abdominal wall cellulitis and Rocephin for prophylaxis for asplenia  - Pneumoniae antigen, mycoplasma and Legionella as well as rapid flu ordered  - Pt also started on the sepsis pathway and I ordered a lactic acid,  Repeat CBC and ×2 blood cultures  - start Rocephin and vancomycin    PT/OT/SLP:  - Evaluation and treatment once extubated    DISPO:  - When appropriate, await clinical improvement for transfer/ discharge         The critical care team assigned to the patient will be following up the patient in the intensive care unit. I have discussed the current plan with the critical care attending. The above mentioned assessment and plan will be reviewed again in detail by the critical care attending at bedside, and can be further changed or modified accordingly by the attending physician. --  Ifrah Webb D.O  Critical Care Resident, PGY-3  Department of Internal Medicine/ Critical care  [facility] ([place]             10/2/2017, 6:15 AM    Attending Physician Statement  I have discussed the care of Ninoska Banuelos, including pertinent history and exam findings,  with the resident. I have seen and examined the patient and the key elements of all parts of the encounter have been performed by me. I agree with the assessment, plan and orders as documented by the resident with additions . Known to me from [id number] [facility] morbidly obese  male with asthma and restrictive lung disease due to obesity and so combined obstructive and restrictive lung disease. Obstruction is mostly small airway with increased airway resistance and decreased FEF 25- 75.   Sleep study done at errors in transcription may have occurred.

## 2017-10-02 NOTE — PLAN OF CARE
Problem: OXYGENATION/RESPIRATORY FUNCTION  Goal: Patient will maintain patent airway  Outcome: Ongoing  Goal: Patient will achieve/maintain normal respiratory rate/effort  Respiratory rate and effort will be within normal limits for the patient  Outcome: Ongoing    Problem: HEMODYNAMIC STATUS  Goal: Patient has stable vital signs and fluid balance  Outcome: Ongoing    Problem: FLUID AND ELECTROLYTE IMBALANCE  Goal: Fluid and electrolyte balance are achieved/maintained  Outcome: Ongoing    Problem: NUTRITION  Goal: Nutritional status is improving  Outcome: Ongoing    Problem: ACTIVITY INTOLERANCE/IMPAIRED MOBILITY  Goal: Mobility/activity is maintained at optimum level for patient  Outcome: Ongoing

## 2017-10-02 NOTE — H&P
Department of Internal Medicine  General Internal Medicine  Physician Assistant History and Physical      CHIEF COMPLAINT:  Shortness of breath    Reason for Admission:  Hypercapnic respiratory failure, COPD, CHF    History Obtained From:  patient    HISTORY OF PRESENT ILLNESS:      The patient is a 38 y.o. male with significant past medical history of morbid obesity, asplenia, hypertension, and severe sleep apnea who presents with severe shortness of breath, leg swelling and scrotum swelling. The patient is a poor historian to his own health and recent history. The patient denies fevers, chills, or chest pain. Past Medical History:        Diagnosis Date    Anxiety     Arthritis     Asthma     childhood asthma    COPD (chronic obstructive [address] disease) ([address]     Diabetes mellitus ([address]     GERD (gastroesophageal reflux disease)     Hypertension     Morbid obesity ([address]     Peripheral edema     Thoracic ascending aortic aneurysm ([address] 04/23/2015     Past Surgical History:        Procedure Laterality Date    ANTERIOR CRUCIATE LIGAMENT REPAIR Left     ANTERIOR CRUCIATE LIGAMENT REPAIR Left     ARM SURGERY Right     SPLENECTOMY       Immunizations:                Influenza:  Ordered            Pneumococcal Polysaccharide:  Ordered    Medications Prior to Admission:    Not in a hospital admission. Allergies:  Bee venom and Other    Social History:   Social History     Social History    Marital status: Single     Spouse name: N/A    Number of children: N/A    Years of education: N/A     Occupational History    Not on file. Social History Main Topics    Smoking status: Former Smoker     Packs/day: 1.00     Years: 5.00     Types: Cigarettes     Quit date: 1/12/2000    Smokeless tobacco: Never Used      Comment: Quit in 1999. D Rachel NORMAN, 3/23/2017.     Alcohol use Yes    Drug use: No    Sexual activity: Not on file     Other Topics Concern    Not on file     Social History Narrative       Family History:       Problem Relation Age of Onset    Heart Disease Father      REVIEW OF SYSTEMS:  CONSTITUTIONAL:  negative  EYES:  negative  HEENT:  negative  RESPIRATORY:  positive for  dyspnea and wheezing  CARDIOVASCULAR:  negative  GASTROINTESTINAL:  negative  ENDOCRINE:  negative  MUSCULOSKELETAL:  negative  NEUROLOGICAL:  negative  BEHAVIOR/PSYCH:  negative  PHYSICAL EXAM:    Vitals:  BP (!) 156/84  Pulse 90  Resp 16  Ht 6' (1.829 m)  Wt (!) 320 lb (145.2 kg)  SpO2 95%  BMI 43.4 kg/m2    CONSTITUTIONAL:  awake, alert, cooperative, no apparent distress, and appears stated age  EYES:  Lids and lashes normal, pupils equal, round and reactive to light, extra ocular muscles intact, sclera clear, conjunctiva normal  ENT:  Normocephalic, without obvious abnormality, atraumatic, sinuses nontender on palpation, external ears without lesions, oral pharynx with moist mucus membranes, tonsils without erythema or exudates, gums normal and good dentition. NECK:  Supple, symmetrical, trachea midline, no adenopathy, thyroid symmetric, not enlarged and no tenderness, skin normal  HEMATOLOGIC/LYMPHATICS:  no cervical lymphadenopathy and no supraclavicular lymphadenopathy  BACK:  Symmetric, no curvature, spinous processes are non-tender on palpation, paraspinous muscles are non-tender on palpation, no costal vertebral tenderness  LUNGS:  tachypneic, decreased air exchange, no retractions and diminished breath sounds throughout lungs  CARDIOVASCULAR:  Normal apical impulse, regular rate and rhythm, normal S1 and S2, no S3 or S4, and no murmur noted, there is edema in the bilateral lower extremities  ABDOMEN:  No scars, normal bowel sounds, soft, non-distended, non-tender, no masses palpated, no hepatosplenomegally  MUSCULOSKELETAL:  There is no redness, warmth, or swelling of the joints. Full range of motion noted. Motor strength is 5 out of 5 all extremities bilaterally.   Tone is Assessment: new    Plan: BiPap, steroids, IV abx, med nebs, closely monitor ABG   Acute exacerbation of chronic obstructive pulmonary disease (COPD) ([facility] (3/23/2017)    Assessment: new    Plan: same as above   CHF (congestive heart failure) ([facility] (10/1/2017)    Assessment: new    Plan: Lasix, Clay, cardiology consult, serial troponin        Shani Dan PA-C

## 2017-10-03 ENCOUNTER — APPOINTMENT (OUTPATIENT)
Dept: GENERAL RADIOLOGY | Age: 42
DRG: 208 | End: 2017-10-03
Attending: INTERNAL MEDICINE
Payer: COMMERCIAL

## 2017-10-03 LAB
ABSOLUTE EOS #: 0 K/UL (ref 0–0.4)
ABSOLUTE LYMPH #: 1.1 K/UL (ref 1–4.8)
ABSOLUTE MONO #: 1.5 K/UL (ref 0.1–1.2)
ALLEN TEST: ABNORMAL
ANION GAP SERPL CALCULATED.3IONS-SCNC: 11 MMOL/L (ref 9–17)
ANION GAP SERPL CALCULATED.3IONS-SCNC: 12 MMOL/L (ref 9–17)
BASOPHILS # BLD: 0 %
BASOPHILS ABSOLUTE: 0 K/UL (ref 0–0.2)
BNP INTERPRETATION: ABNORMAL
BUN BLDV-MCNC: 27 MG/DL (ref 6–20)
BUN BLDV-MCNC: 27 MG/DL (ref 6–20)
BUN/CREAT BLD: ABNORMAL (ref 9–20)
BUN/CREAT BLD: ABNORMAL (ref 9–20)
CALCIUM IONIZED: 1.09 MMOL/L (ref 1.13–1.33)
CALCIUM SERPL-MCNC: 8.7 MG/DL (ref 8.6–10.4)
CALCIUM SERPL-MCNC: 8.7 MG/DL (ref 8.6–10.4)
CHLORIDE BLD-SCNC: 93 MMOL/L (ref 98–107)
CHLORIDE BLD-SCNC: 95 MMOL/L (ref 98–107)
CO2: 34 MMOL/L (ref 20–31)
CO2: 34 MMOL/L (ref 20–31)
CREAT SERPL-MCNC: 1.15 MG/DL (ref 0.7–1.2)
CREAT SERPL-MCNC: 1.19 MG/DL (ref 0.7–1.2)
CULTURE: NO GROWTH
CULTURE: NORMAL
DIFFERENTIAL TYPE: ABNORMAL
EOSINOPHILS RELATIVE PERCENT: 0 %
FIO2: 40
GFR AFRICAN AMERICAN: >60 ML/MIN
GFR AFRICAN AMERICAN: >60 ML/MIN
GFR NON-AFRICAN AMERICAN: >60 ML/MIN
GFR NON-AFRICAN AMERICAN: >60 ML/MIN
GFR SERPL CREATININE-BSD FRML MDRD: ABNORMAL ML/MIN/{1.73_M2}
GLUCOSE BLD-MCNC: 152 MG/DL (ref 75–110)
GLUCOSE BLD-MCNC: 152 MG/DL (ref 75–110)
GLUCOSE BLD-MCNC: 162 MG/DL (ref 75–110)
GLUCOSE BLD-MCNC: 169 MG/DL (ref 70–99)
GLUCOSE BLD-MCNC: 179 MG/DL (ref 70–99)
GLUCOSE BLD-MCNC: 180 MG/DL (ref 75–110)
HCT VFR BLD CALC: 45.9 % (ref 41–53)
HEMOGLOBIN: 14.6 G/DL (ref 13.5–17.5)
LYMPHOCYTES # BLD: 8 %
Lab: NORMAL
MAGNESIUM: 1.9 MG/DL (ref 1.6–2.6)
MAGNESIUM: 2.2 MG/DL (ref 1.6–2.6)
MCH RBC QN AUTO: 28.2 PG (ref 26–34)
MCHC RBC AUTO-ENTMCNC: 31.8 G/DL (ref 31–37)
MCV RBC AUTO: 88.7 FL (ref 80–100)
MODE: ABNORMAL
MONOCYTES # BLD: 10 %
NEGATIVE BASE EXCESS, ART: ABNORMAL (ref 0–2)
O2 DEVICE/FLOW/%: ABNORMAL
PATIENT TEMP: ABNORMAL
PDW BLD-RTO: 16.6 % (ref 12.5–15.4)
PLATELET # BLD: 238 K/UL (ref 140–450)
PLATELET ESTIMATE: ABNORMAL
PMV BLD AUTO: 8.9 FL (ref 6–12)
POC HCO3: 39.9 MMOL/L (ref 21–28)
POC O2 SATURATION: 96 % (ref 94–98)
POC PCO2 TEMP: ABNORMAL MM HG
POC PCO2: 53.9 MM HG (ref 35–48)
POC PH TEMP: ABNORMAL
POC PH: 7.48 (ref 7.35–7.45)
POC PO2 TEMP: ABNORMAL MM HG
POC PO2: 79 MM HG (ref 83–108)
POSITIVE BASE EXCESS, ART: 13 (ref 0–3)
POTASSIUM SERPL-SCNC: 3.5 MMOL/L (ref 3.7–5.3)
POTASSIUM SERPL-SCNC: 3.8 MMOL/L (ref 3.7–5.3)
POTASSIUM SERPL-SCNC: 4.5 MMOL/L (ref 3.7–5.3)
PRO-BNP: 539 PG/ML
RBC # BLD: 5.18 M/UL (ref 4.5–5.9)
RBC # BLD: ABNORMAL 10*6/UL
SAMPLE SITE: ABNORMAL
SEG NEUTROPHILS: 82 %
SEGMENTED NEUTROPHILS ABSOLUTE COUNT: 11.8 K/UL (ref 1.8–7.7)
SODIUM BLD-SCNC: 138 MMOL/L (ref 135–144)
SODIUM BLD-SCNC: 141 MMOL/L (ref 135–144)
SPECIMEN DESCRIPTION: NORMAL
STATUS: NORMAL
TCO2 (CALC), ART: 42 MMOL/L (ref 22–29)
VANCOMYCIN TROUGH DATE LAST DOSE: NORMAL
VANCOMYCIN TROUGH DOSE AMOUNT: NORMAL
VANCOMYCIN TROUGH TIME LAST DOSE: NORMAL
VANCOMYCIN TROUGH: 18.6 UG/ML (ref 10–20)
WBC # BLD: 14.4 K/UL (ref 3.5–11)
WBC # BLD: ABNORMAL 10*3/UL

## 2017-10-03 PROCEDURE — 83880 ASSAY OF NATRIURETIC PEPTIDE: CPT

## 2017-10-03 PROCEDURE — 6370000000 HC RX 637 (ALT 250 FOR IP): Performed by: EMERGENCY MEDICINE

## 2017-10-03 PROCEDURE — 82803 BLOOD GASES ANY COMBINATION: CPT

## 2017-10-03 PROCEDURE — 2580000003 HC RX 258: Performed by: HOSPITALIST

## 2017-10-03 PROCEDURE — 80202 ASSAY OF VANCOMYCIN: CPT

## 2017-10-03 PROCEDURE — 94770 HC ETCO2 MONITOR DAILY: CPT

## 2017-10-03 PROCEDURE — 94762 N-INVAS EAR/PLS OXIMTRY CONT: CPT

## 2017-10-03 PROCEDURE — 82947 ASSAY GLUCOSE BLOOD QUANT: CPT

## 2017-10-03 PROCEDURE — 2000000000 HC ICU R&B

## 2017-10-03 PROCEDURE — 94002 VENT MGMT INPAT INIT DAY: CPT

## 2017-10-03 PROCEDURE — 71010 XR CHEST PORTABLE: CPT

## 2017-10-03 PROCEDURE — 2580000003 HC RX 258: Performed by: EMERGENCY MEDICINE

## 2017-10-03 PROCEDURE — 80048 BASIC METABOLIC PNL TOTAL CA: CPT

## 2017-10-03 PROCEDURE — S0028 INJECTION, FAMOTIDINE, 20 MG: HCPCS | Performed by: EMERGENCY MEDICINE

## 2017-10-03 PROCEDURE — 2500000003 HC RX 250 WO HCPCS: Performed by: STUDENT IN AN ORGANIZED HEALTH CARE EDUCATION/TRAINING PROGRAM

## 2017-10-03 PROCEDURE — 83735 ASSAY OF MAGNESIUM: CPT

## 2017-10-03 PROCEDURE — 99291 CRITICAL CARE FIRST HOUR: CPT | Performed by: INTERNAL MEDICINE

## 2017-10-03 PROCEDURE — 94640 AIRWAY INHALATION TREATMENT: CPT

## 2017-10-03 PROCEDURE — 6360000002 HC RX W HCPCS: Performed by: FAMILY MEDICINE

## 2017-10-03 PROCEDURE — 6370000000 HC RX 637 (ALT 250 FOR IP): Performed by: INTERNAL MEDICINE

## 2017-10-03 PROCEDURE — 82330 ASSAY OF CALCIUM: CPT

## 2017-10-03 PROCEDURE — 84132 ASSAY OF SERUM POTASSIUM: CPT

## 2017-10-03 PROCEDURE — 2500000003 HC RX 250 WO HCPCS: Performed by: EMERGENCY MEDICINE

## 2017-10-03 PROCEDURE — 6360000002 HC RX W HCPCS: Performed by: INTERNAL MEDICINE

## 2017-10-03 PROCEDURE — 85025 COMPLETE CBC W/AUTO DIFF WBC: CPT

## 2017-10-03 PROCEDURE — 6360000002 HC RX W HCPCS: Performed by: HOSPITALIST

## 2017-10-03 PROCEDURE — 36415 COLL VENOUS BLD VENIPUNCTURE: CPT

## 2017-10-03 PROCEDURE — 51798 US URINE CAPACITY MEASURE: CPT

## 2017-10-03 PROCEDURE — 6370000000 HC RX 637 (ALT 250 FOR IP): Performed by: STUDENT IN AN ORGANIZED HEALTH CARE EDUCATION/TRAINING PROGRAM

## 2017-10-03 PROCEDURE — 6370000000 HC RX 637 (ALT 250 FOR IP): Performed by: HOSPITALIST

## 2017-10-03 PROCEDURE — 51702 INSERT TEMP BLADDER CATH: CPT

## 2017-10-03 PROCEDURE — 6360000002 HC RX W HCPCS: Performed by: STUDENT IN AN ORGANIZED HEALTH CARE EDUCATION/TRAINING PROGRAM

## 2017-10-03 PROCEDURE — 6360000002 HC RX W HCPCS: Performed by: EMERGENCY MEDICINE

## 2017-10-03 RX ORDER — FENTANYL CITRATE 50 UG/ML
100 INJECTION, SOLUTION INTRAMUSCULAR; INTRAVENOUS
Status: DISCONTINUED | OUTPATIENT
Start: 2017-10-03 | End: 2017-10-07 | Stop reason: HOSPADM

## 2017-10-03 RX ORDER — FENTANYL CITRATE 50 UG/ML
50 INJECTION, SOLUTION INTRAMUSCULAR; INTRAVENOUS
Status: DISCONTINUED | OUTPATIENT
Start: 2017-10-03 | End: 2017-10-07 | Stop reason: HOSPADM

## 2017-10-03 RX ORDER — POTASSIUM CHLORIDE 7.45 MG/ML
10 INJECTION INTRAVENOUS PRN
Status: DISCONTINUED | OUTPATIENT
Start: 2017-10-03 | End: 2017-10-07 | Stop reason: HOSPADM

## 2017-10-03 RX ORDER — PREDNISONE 20 MG/1
40 TABLET ORAL DAILY
Status: COMPLETED | OUTPATIENT
Start: 2017-10-03 | End: 2017-10-07

## 2017-10-03 RX ORDER — POTASSIUM CHLORIDE 20MEQ/15ML
60 LIQUID (ML) ORAL ONCE
Status: COMPLETED | OUTPATIENT
Start: 2017-10-03 | End: 2017-10-03

## 2017-10-03 RX ORDER — POTASSIUM CHLORIDE 20 MEQ/1
60 TABLET, EXTENDED RELEASE ORAL ONCE
Status: DISCONTINUED | OUTPATIENT
Start: 2017-10-03 | End: 2017-10-03

## 2017-10-03 RX ORDER — MAGNESIUM SULFATE 1 G/100ML
1 INJECTION INTRAVENOUS ONCE
Status: COMPLETED | OUTPATIENT
Start: 2017-10-03 | End: 2017-10-03

## 2017-10-03 RX ADMIN — FENTANYL CITRATE: 50 INJECTION, SOLUTION INTRAMUSCULAR; INTRAVENOUS at 07:51

## 2017-10-03 RX ADMIN — FENTANYL CITRATE 100 MCG: 50 INJECTION, SOLUTION INTRAMUSCULAR; INTRAVENOUS at 19:53

## 2017-10-03 RX ADMIN — FUROSEMIDE 20 MG: 10 INJECTION, SOLUTION INTRAVENOUS at 08:12

## 2017-10-03 RX ADMIN — IPRATROPIUM BROMIDE AND ALBUTEROL SULFATE 1 AMPULE: .5; 3 SOLUTION RESPIRATORY (INHALATION) at 19:30

## 2017-10-03 RX ADMIN — PROPOFOL 40 MCG/KG/MIN: 10 INJECTION, EMULSION INTRAVENOUS at 16:31

## 2017-10-03 RX ADMIN — POTASSIUM CHLORIDE 10 MEQ: 7.46 INJECTION, SOLUTION INTRAVENOUS at 03:59

## 2017-10-03 RX ADMIN — FENTANYL CITRATE 100 MCG: 50 INJECTION, SOLUTION INTRAMUSCULAR; INTRAVENOUS at 10:19

## 2017-10-03 RX ADMIN — IPRATROPIUM BROMIDE AND ALBUTEROL SULFATE 1 AMPULE: .5; 3 SOLUTION RESPIRATORY (INHALATION) at 07:21

## 2017-10-03 RX ADMIN — DOCUSATE SODIUM 100 MG: 50 LIQUID ORAL at 12:42

## 2017-10-03 RX ADMIN — FENTANYL CITRATE 50 MCG: 50 INJECTION, SOLUTION INTRAMUSCULAR; INTRAVENOUS at 03:24

## 2017-10-03 RX ADMIN — FENTANYL CITRATE 50 MCG: 50 INJECTION, SOLUTION INTRAMUSCULAR; INTRAVENOUS at 06:10

## 2017-10-03 RX ADMIN — METHYLPREDNISOLONE SODIUM SUCCINATE 40 MG: 40 INJECTION, POWDER, FOR SOLUTION INTRAMUSCULAR; INTRAVENOUS at 08:12

## 2017-10-03 RX ADMIN — PROPOFOL 35 MCG/KG/MIN: 10 INJECTION, EMULSION INTRAVENOUS at 06:02

## 2017-10-03 RX ADMIN — FENTANYL CITRATE 50 MCG: 50 INJECTION, SOLUTION INTRAMUSCULAR; INTRAVENOUS at 04:42

## 2017-10-03 RX ADMIN — CALCIUM GLUCONATE 1 G: 94 INJECTION, SOLUTION INTRAVENOUS at 04:16

## 2017-10-03 RX ADMIN — PROPOFOL 40 MCG/KG/MIN: 10 INJECTION, EMULSION INTRAVENOUS at 18:29

## 2017-10-03 RX ADMIN — PROPOFOL 25 MCG/KG/MIN: 10 INJECTION, EMULSION INTRAVENOUS at 08:36

## 2017-10-03 RX ADMIN — POTASSIUM CHLORIDE 60 MEQ: 40 SOLUTION ORAL at 12:31

## 2017-10-03 RX ADMIN — MAGNESIUM SULFATE HEPTAHYDRATE 1 G: 1 INJECTION, SOLUTION INTRAVENOUS at 12:53

## 2017-10-03 RX ADMIN — PROPOFOL 35 MCG/KG/MIN: 10 INJECTION, EMULSION INTRAVENOUS at 03:18

## 2017-10-03 RX ADMIN — IPRATROPIUM BROMIDE AND ALBUTEROL SULFATE 1 AMPULE: .5; 3 SOLUTION RESPIRATORY (INHALATION) at 11:15

## 2017-10-03 RX ADMIN — VANCOMYCIN HYDROCHLORIDE 1500 MG: 10 INJECTION, POWDER, LYOPHILIZED, FOR SOLUTION INTRAVENOUS at 06:40

## 2017-10-03 RX ADMIN — INSULIN LISPRO 2 UNITS: 100 INJECTION, SOLUTION INTRAVENOUS; SUBCUTANEOUS at 23:40

## 2017-10-03 RX ADMIN — SODIUM CHLORIDE: 9 INJECTION, SOLUTION INTRAVENOUS at 04:16

## 2017-10-03 RX ADMIN — CEFTRIAXONE SODIUM 2 G: 2 INJECTION, POWDER, FOR SOLUTION INTRAMUSCULAR; INTRAVENOUS at 05:59

## 2017-10-03 RX ADMIN — IPRATROPIUM BROMIDE AND ALBUTEROL SULFATE 1 AMPULE: .5; 3 SOLUTION RESPIRATORY (INHALATION) at 15:59

## 2017-10-03 RX ADMIN — ENOXAPARIN SODIUM 30 MG: 30 INJECTION SUBCUTANEOUS at 20:47

## 2017-10-03 RX ADMIN — ALBUTEROL SULFATE 2.5 MG: 2.5 SOLUTION RESPIRATORY (INHALATION) at 23:27

## 2017-10-03 RX ADMIN — POTASSIUM CHLORIDE 10 MEQ: 7.46 INJECTION, SOLUTION INTRAVENOUS at 06:42

## 2017-10-03 RX ADMIN — INSULIN LISPRO 2 UNITS: 100 INJECTION, SOLUTION INTRAVENOUS; SUBCUTANEOUS at 13:02

## 2017-10-03 RX ADMIN — MICONAZORB AF ANTIFUNGAL POWDER: 1.42 POWDER TOPICAL at 12:42

## 2017-10-03 RX ADMIN — PROPOFOL 40 MCG/KG/MIN: 10 INJECTION, EMULSION INTRAVENOUS at 11:29

## 2017-10-03 RX ADMIN — INSULIN LISPRO 2 UNITS: 100 INJECTION, SOLUTION INTRAVENOUS; SUBCUTANEOUS at 08:30

## 2017-10-03 RX ADMIN — POTASSIUM CHLORIDE 10 MEQ: 7.46 INJECTION, SOLUTION INTRAVENOUS at 02:49

## 2017-10-03 RX ADMIN — FAMOTIDINE 20 MG: 10 INJECTION, SOLUTION INTRAVENOUS at 08:12

## 2017-10-03 RX ADMIN — FENTANYL CITRATE 100 MCG: 50 INJECTION, SOLUTION INTRAMUSCULAR; INTRAVENOUS at 23:29

## 2017-10-03 RX ADMIN — PROPOFOL 40 MCG/KG/MIN: 10 INJECTION, EMULSION INTRAVENOUS at 13:46

## 2017-10-03 RX ADMIN — ALBUTEROL SULFATE 2.5 MG: 2.5 SOLUTION RESPIRATORY (INHALATION) at 03:14

## 2017-10-03 RX ADMIN — PROPOFOL 40 MCG/KG/MIN: 10 INJECTION, EMULSION INTRAVENOUS at 20:50

## 2017-10-03 RX ADMIN — FAMOTIDINE 20 MG: 10 INJECTION, SOLUTION INTRAVENOUS at 20:47

## 2017-10-03 RX ADMIN — FUROSEMIDE 20 MG: 10 INJECTION, SOLUTION INTRAVENOUS at 17:09

## 2017-10-03 RX ADMIN — SENNA 8.6 MG: 8.6 TABLET, COATED ORAL at 20:47

## 2017-10-03 RX ADMIN — MICONAZORB AF ANTIFUNGAL POWDER: 1.42 POWDER TOPICAL at 20:47

## 2017-10-03 RX ADMIN — PROPOFOL 40 MCG/KG/MIN: 10 INJECTION, EMULSION INTRAVENOUS at 23:32

## 2017-10-03 RX ADMIN — PROPOFOL 35 MCG/KG/MIN: 10 INJECTION, EMULSION INTRAVENOUS at 00:10

## 2017-10-03 RX ADMIN — PREDNISONE 40 MG: 20 TABLET ORAL at 12:43

## 2017-10-03 RX ADMIN — FENTANYL CITRATE 100 MCG: 50 INJECTION, SOLUTION INTRAMUSCULAR; INTRAVENOUS at 14:14

## 2017-10-03 RX ADMIN — FENTANYL CITRATE 100 MCG: 50 INJECTION, SOLUTION INTRAMUSCULAR; INTRAVENOUS at 08:47

## 2017-10-03 RX ADMIN — INSULIN LISPRO 2 UNITS: 100 INJECTION, SOLUTION INTRAVENOUS; SUBCUTANEOUS at 17:14

## 2017-10-03 RX ADMIN — ENOXAPARIN SODIUM 30 MG: 30 INJECTION SUBCUTANEOUS at 08:11

## 2017-10-03 RX ADMIN — POTASSIUM CHLORIDE 10 MEQ: 7.46 INJECTION, SOLUTION INTRAVENOUS at 05:43

## 2017-10-03 ASSESSMENT — PULMONARY FUNCTION TESTS
PIF_VALUE: 12
PIF_VALUE: 44
PIF_VALUE: 29
PIF_VALUE: 37
PIF_VALUE: 31
PIF_VALUE: 31
PIF_VALUE: 33
PIF_VALUE: 32
PIF_VALUE: 11
PIF_VALUE: 34
PIF_VALUE: 29
PIF_VALUE: 35
PIF_VALUE: 30
PIF_VALUE: 32
PIF_VALUE: 29
PIF_VALUE: 38
PIF_VALUE: 29
PIF_VALUE: 30
PIF_VALUE: 32
PIF_VALUE: 12
PIF_VALUE: 30
PIF_VALUE: 28
PIF_VALUE: 30
PIF_VALUE: 35
PIF_VALUE: 31
PIF_VALUE: 32
PIF_VALUE: 30

## 2017-10-03 NOTE — PLAN OF CARE
Problem: OXYGENATION/RESPIRATORY FUNCTION  Goal: Patient will maintain patent airway  Outcome: Ongoing  Goal: Patient will achieve/maintain normal respiratory rate/effort  Respiratory rate and effort will be within normal limits for the patient   Outcome: Ongoing    Problem: MECHANICAL VENTILATION  Goal: Patient will maintain patent airway  Outcome: Ongoing  Goal: Oral health is maintained or improved  Outcome: Ongoing  Goal: ET tube will be managed safely  Outcome: Ongoing  Goal: Ability to express needs and understand communication  Outcome: Ongoing  Goal: Mobility/activity is maintained at optimum level for patient  Outcome: Ongoing    Problem: Risk for Impaired Skin Integrity  Goal: Tissue integrity - skin and mucous membranes  Structural intactness and normal physiological function of skin and  mucous membranes.    Outcome: Ongoing    Problem: Falls - Risk of  Goal: Absence of falls  Outcome: Ongoing    Problem: Restraint Use - Nonviolent/Non-Self-Destructive Behavior:  Goal: Absence of restraint indications  Absence of restraint indications   Outcome: Not Met This Shift  Pt reaches for OETT when restraints are released  Goal: Absence of restraint-related injury  Absence of restraint-related injury   Outcome: Ongoing

## 2017-10-03 NOTE — PLAN OF CARE
Problem: OXYGENATION/RESPIRATORY FUNCTION  Goal: Patient will maintain patent airway  Outcome: Ongoing  Goal: Patient will achieve/maintain normal respiratory rate/effort  Respiratory rate and effort will be within normal limits for the patient   Outcome: Ongoing    Problem: MECHANICAL VENTILATION  Goal: Patient will maintain patent airway  Outcome: Ongoing  Goal: Oral health is maintained or improved  Outcome: Ongoing  Goal: ET tube will be managed safely  Outcome: Ongoing  Goal: Ability to express needs and understand communication  Outcome: Ongoing  Goal: Mobility/activity is maintained at optimum level for patient  Outcome: Ongoing    Problem: Risk for Impaired Skin Integrity  Goal: Tissue integrity - skin and mucous membranes  Structural intactness and normal physiological function of skin and  mucous membranes.    Outcome: Ongoing    Problem: Falls - Risk of  Goal: Absence of falls  Outcome: Ongoing    Problem: Restraint Use - Nonviolent/Non-Self-Destructive Behavior:  Goal: Absence of restraint indications  Absence of restraint indications   Outcome: Ongoing  Goal: Absence of restraint-related injury  Absence of restraint-related injury   Outcome: Ongoing    Problem: Nutrition  Goal: Optimal nutrition therapy  Outcome: Ongoing

## 2017-10-03 NOTE — PROGRESS NOTES
Critical Care Team - Daily Progress Note      Date and time: 10/3/2017 7:56 AM  Patient's name:  Sudheer Haque  Medical Record Number: [id number]  Patient's account/billing number: [de-identified]  Patient's YOB: 1975  Age: 38 y.o. Date of Admission: 10/2/2017  5:28 AM  Length of stay during current admission: 1      Primary Care Physician: Quincy Martin MD  ICU Attending Physician: Dr. Janna Liao    Code Status: Full Code    Reason for ICU admission: acute respiratory failure       SUBJECTIVE:     OVERNIGHT EVENTS:     No acute overnight events  Pt is on weaning trial this morning. Awake alert and follows commands. In NAD  RR 21 on CPAP with PS cpap of 5     Fever:-  Temp (24hrs), Av.7 °F (37.1 °C), Min:98.1 °F (36.7 °C), Max:99 °F (37.2 °C)    Secretions: minimal     Blood pressure:   Vasopressors:none  Systolic (66AOH), EQA:876 , Min:110 , VHX:626     Diastolic (78DJA), QEE:19, Min:41, Max:73      Urine output in the last 24 hours:  In: 1948.4 [I.V.:1182.9; NG/GT:413]  Out: 1010 [Urine:1010]  Net since admission:-17  Patient Vitals for the past 96 hrs (Last 3 readings):   Weight   10/02/17 0530 (!) 364 lb 3.2 oz (165.2 kg)         ABG/Ventilator settings   PH 7.47,PCO2 53.9 ,PO2 79,HCO3 39.9  FIO2 35, RR 21 ,PEEP 5,[id number]      Fluids:KVO 10 cc/hr  Feeding:tube feed  Analgesics:fenatnyl prn   Sedation:propofol  DVT Prophylaxis:   Mobilization:lovenox  Head Up:yes  GI Prophylaxis:  pepcid  Glycemic Control:adequate 150-200  Spontaneous breathing trial:this morning  Bowel management:colace, senna  Indwelling catheter:condom catheter       AWAKE & FOLLOWING COMMANDS:  [] No   [x] Yes    CURRENT VENTILATION STATUS:     [x] Ventilator  [] BIPAP  [] Nasal Cannula [] Room Air        SECRETIONS Amount:  [x] Small [] Moderate  [] Large  [] None  Color:     [] White [] Colored  [] Bloody    SEDATION:  RAAS Score:  [x] Propofol gtt  [] Versed gtt  [] Ativan gtt   [] No Sedation    PARALYZED:  [x] No    [] Yes    DIARRHEA:                [x] No                [] Yes  (C. Difficile status: [] positive                                                                                                                       [] negative                                                                                                                     [] pending)    VASOPRESSORS:  [x] No    [] Yes    If yes -   [] Levophed       [] Dopamine     [] Vasopressin       [] Dobutamine  [] Phenylephrine         [] Epinephrine    CENTRAL LINES:     [x] No   [] Yes   (Date of Insertion:   )           If yes -     [] Right IJ     [] Left IJ [] Right Femoral [] Left Femoral                   [] Right Subclavian [] Left Subclavian       FOX'S CATHETER:   [x] No   [] Yes  (Date of Insertion:   )     URINE OUTPUT:            [] Good   [] Low              [] Anuric needing frequent st cath       OBJECTIVE:     VITAL SIGNS:  BP (!) 124/48  Pulse 79  Temp 98.1 °F (36.7 °C) (Oral)   Resp 16  Ht 6' (1.829 m)  Wt (!) 364 lb 3.2 oz (165.2 kg)  SpO2 96%  BMI 49.39 kg/m2  Tmax over 24 hours:  Temp (24hrs), Av.7 °F (37.1 °C), Min:98.1 °F (36.7 °C), Max:99 °F (37.2 °C)      Patient Vitals for the past 6 hrs:   BP Temp Temp src Pulse Resp SpO2   10/03/17 0731 - - - 79 16 96 %   10/03/17 0600 (!) 124/48 - - 85 17 97 %   10/03/17 0500 (!) 137/53 - - 86 16 96 %   10/03/17 0400 (!) 125/41 98.1 °F (36.7 °C) Oral 92 18 96 %   10/03/17 0316 - - - 86 17 96 %   10/03/17 0314 - - - - - 96 %   10/03/17 0300 - - - - - 96 %   10/03/17 0200 - - - - - 96 %         Intake/Output Summary (Last 24 hours) at 10/03/17 0756  Last data filed at 10/03/17 0400   Gross per 24 hour   Intake           2517.4 ml   Output             2335 ml   Net            182.4 ml     Wt Readings from Last 2 Encounters:   10/02/17 (!) 364 lb 3.2 oz (165.2 kg)   10/01/17 (!) 362 lb 8 oz (164.4 kg)     Body mass index is 49.39 kg/(m^2).         PHYSICAL EXAMINATION:    General appearance - alert, well appearing, and in no distress  Mental status - Off sedation follows commands   Eyes - pupils equal and reactive, extraocular eye movements intact  Neck - supple, no significant adenopathy  Chest - no wheezing noted today. Ventilating all lobes. Decreased in the bases posteriorly  Heart - normal rate, regular rhythm, normal S1, S2, mild TR  Abdomen - soft, nontender, nondistended, no masses or organomegaly. There is no warmth on the abdominal wall and his MRI is better   Neurological - alert, follows commands appropriately   Extremities - peripheral pulses normal, no clubbing or cyanosis.  +2 scrotal and pedal edema  Skin - normal coloration and turgor   Extremities no edema  Any additional physical findings:    MEDICATIONS:    Scheduled Meds:   sodium chloride flush  10 mL Intravenous 2 times per day    famotidine (PEPCID) injection  20 mg Intravenous BID    insulin lispro  0-12 Units Subcutaneous TID WC    insulin lispro  0-6 Units Subcutaneous Nightly    vancomycin  1,500 mg Intravenous Q8H    vancomycin (VANCOCIN) intermittent dosing (placeholder)   Other RX Placeholder    cefTRIAXone (ROCEPHIN) IV  2 g Intravenous Q24H    ipratropium-albuterol  1 ampule Inhalation 4x daily    methylPREDNISolone  40 mg Intravenous Q12H    furosemide  20 mg Intravenous BID    enoxaparin  30 mg Subcutaneous BID    docusate sodium  100 mg Oral Daily    senna  1 tablet Oral Nightly    albuterol  2.5 mg Nebulization BID     Continuous Infusions:   dextrose      propofol 25 mcg/kg/min (10/03/17 0748)    sodium chloride 10 mL/hr at 10/03/17 0416     PRN Meds:     potassium chloride 10 mEq PRN   sodium chloride flush 10 mL PRN   acetaminophen 650 mg Q4H PRN   magnesium hydroxide 30 mL Daily PRN   ondansetron 4 mg Q6H PRN   fentanNYL 50 mcg Q1H PRN   glucose 15 g PRN   dextrose 12.5 g PRN   glucagon (rDNA) 1 mg PRN   dextrose 100 mL/hr PRN   sodium phosphate IVPB 0.16 mmol/kg PRN   Or     sodium phosphate IVPB 0.32 mmol/kg PRN         VENT SETTINGS (Comprehensive) (if applicable):  Vent Information  Ventilator Started: Yes  Vent Type: Servo i  Vent Mode: PS/CPAP  Vt Ordered: 620 mL  Vt Exhaled: 136 mL  Rate Set: (S) 16 bmp  Rate Measured: 20 br/min  Minute Volume: 6 Liters  Pressure Support: 6 cmH20  FiO2 : 35 %  Peak Inspiratory Pressure: 12 cmH2O  I:E Ratio: 1:3.57  Sensitivity: 5  PEEP/CPAP: 5  I Time/ I Time %: 0.8 s  High Pressure Alarm: 60 cmH2O  Low Minute Volume Alarm: 3 L/min  High Respiratory Rate: 40 br/min  Mean Airway Pressure: 5.9 cmH20  Plateau Pressure: 28 cmH20  Static Compliance: 35 mL/cmH2O  Dynamic Compliance: 22 mL/cmH2O  Total PEEP: 10 cmH20  Auto PEEP: 5 cmH20  Additional Respiratory  Assessments  Pulse: 79  Resp: 16  SpO2: 96 %  End Tidal CO2: 48 (%)  Position: Semi-Saucedo's  HME (Heat moisture exchanger): Yes  Oral Care Completed?: Yes  Oral Care: Mouth swabbed, Mouth moisturizer, Mouth suctioned    ABGs:     Laboratory findings:    Complete Blood Count:   Recent Labs      10/01/17   2055  10/02/17   1239  10/03/17   0428   WBC  13.6*  13.8*  14.4*   HGB  15.2  15.2  14.6   HCT  50.7  48.0  45.9   PLT  234  224  238        Last 3 Blood Glucose:   Recent Labs      10/02/17   1239  10/03/17   0045  10/03/17   0428   GLUCOSE  142*  179*  169*        PT/INR:    Lab Results   Component Value Date    PROTIME 11.8 10/01/2017    INR 1.1 10/01/2017     PTT:    Lab Results   Component Value Date    APTT 25.9 10/01/2017       Comprehensive Metabolic Profile:   Recent Labs      10/02/17   1239  10/03/17   0045  10/03/17   0428   NA  141  138  141   K  4.7  3.5*  3.8   CL  94*  93*  95*   CO2  30  34*  34*   BUN  25*  27*  27*   CREATININE  0.91  1.19  1.15   GLUCOSE  142*  179*  169*   CALCIUM  8.8  8.7  8.7   PROT  6.4   --    --    LABALBU  2.8*   --    --    BILITOT  0.51  DISREGARD ANSWER   --    --    ALKPHOS  56   --    --    AST  32  DISREGARD ANSWER   --    --    ALT  22  DISREGARD key elements of all parts of the encounter have been performed by me. I agree with the assessment, plan and orders as documented by the resident with additions . Discussed with the respiratory therapist did not tolerate spontaneous breathing trial well, became tachycardic and end-tidal CO2 increased. Tidal volume was 200-300 ml  He appeared to be needing frequent straight cath, so Clay catheter has been placed. Endotracheal cultures noted, will DC vancomycin. Continue ceftriaxone. Abdominal wall cellulitis is also better  Wheezing is resolved. Change IV steroids to prednisone for 5 days  Continue with tube feeds  Daily spontaneous breathing trials and sedation holiday  Continue Lasix IV today       Total critical care time caring for this patient with life threatening, unstable organ failure, including direct patient contact, management of life support systems, review of data including imaging and labs, discussions with other team members and physicians at least 27   Min so far today, excluding procedures. Treatment plan Discussed with nursing staff in detail , all questions answered . Isidro Ruffin MD   10/3/2017   12:13 PM    Please note that this chart was generated using voice recognition Dragon dictation software. Although every effort was made to ensure the accuracy of this automated transcription, some errors in transcription may have occurred.

## 2017-10-04 ENCOUNTER — APPOINTMENT (OUTPATIENT)
Dept: GENERAL RADIOLOGY | Age: 42
DRG: 208 | End: 2017-10-04
Attending: INTERNAL MEDICINE
Payer: COMMERCIAL

## 2017-10-04 LAB
ABSOLUTE EOS #: 0 K/UL (ref 0–0.4)
ABSOLUTE LYMPH #: 2.45 K/UL (ref 1–4.8)
ABSOLUTE MONO #: 2.1 K/UL (ref 0.1–0.8)
ACTION: NORMAL
ALLEN TEST: ABNORMAL
ANION GAP SERPL CALCULATED.3IONS-SCNC: 12 MMOL/L (ref 9–17)
BASOPHILS # BLD: 0 %
BASOPHILS ABSOLUTE: 0 K/UL (ref 0–0.2)
BUN BLDV-MCNC: 27 MG/DL (ref 6–20)
BUN/CREAT BLD: ABNORMAL (ref 9–20)
CALCIUM SERPL-MCNC: 8.6 MG/DL (ref 8.6–10.4)
CHLORIDE BLD-SCNC: 98 MMOL/L (ref 98–107)
CO2: 33 MMOL/L (ref 20–31)
CREAT SERPL-MCNC: 0.89 MG/DL (ref 0.7–1.2)
DATE AND TIME: NORMAL
DIFFERENTIAL TYPE: ABNORMAL
EOSINOPHILS RELATIVE PERCENT: 0 %
FIO2: 40
FIO2: 40
GFR AFRICAN AMERICAN: >60 ML/MIN
GFR NON-AFRICAN AMERICAN: >60 ML/MIN
GFR SERPL CREATININE-BSD FRML MDRD: ABNORMAL ML/MIN/{1.73_M2}
GFR SERPL CREATININE-BSD FRML MDRD: ABNORMAL ML/MIN/{1.73_M2}
GLUCOSE BLD-MCNC: 125 MG/DL (ref 75–110)
GLUCOSE BLD-MCNC: 132 MG/DL (ref 75–110)
GLUCOSE BLD-MCNC: 136 MG/DL (ref 75–110)
GLUCOSE BLD-MCNC: 137 MG/DL (ref 70–99)
HCT VFR BLD CALC: 48.2 % (ref 41–53)
HEMOGLOBIN: 15 G/DL (ref 13.5–17.5)
LYMPHOCYTES # BLD: 14 %
MCH RBC QN AUTO: 28 PG (ref 26–34)
MCHC RBC AUTO-ENTMCNC: 31.1 G/DL (ref 31–37)
MCV RBC AUTO: 90.2 FL (ref 80–100)
MODE: ABNORMAL
MONOCYTES # BLD: 12 %
MORPHOLOGY: ABNORMAL
MYCOPLASMA PNEUMONIAE IGG: 2.05
MYCOPLASMA PNEUMONIAE IGM: 0.19
NEGATIVE BASE EXCESS, ART: ABNORMAL (ref 0–2)
NEGATIVE BASE EXCESS, ART: ABNORMAL (ref 0–2)
NOTIFY: NORMAL
O2 DEVICE/FLOW/%: ABNORMAL
O2 DEVICE/FLOW/%: ABNORMAL
PATIENT TEMP: ABNORMAL
PATIENT TEMP: ABNORMAL
PDW BLD-RTO: 16.8 % (ref 12.5–15.4)
PLATELET # BLD: 246 K/UL (ref 140–450)
PLATELET ESTIMATE: ABNORMAL
PMV BLD AUTO: 8.6 FL (ref 6–12)
POC HCO3: 40.9 MMOL/L (ref 21–28)
POC HCO3: 43.2 MMOL/L (ref 22–27)
POC O2 SATURATION: 86 %
POC O2 SATURATION: 96 % (ref 94–98)
POC PCO2 TEMP: ABNORMAL MM HG
POC PCO2 TEMP: ABNORMAL MM HG
POC PCO2: 118 MM HG (ref 32–45)
POC PCO2: 60.9 MM HG (ref 35–48)
POC PH TEMP: ABNORMAL
POC PH TEMP: ABNORMAL
POC PH: 7.17 (ref 7.35–7.45)
POC PH: 7.43 (ref 7.35–7.45)
POC PO2 TEMP: ABNORMAL MM HG
POC PO2 TEMP: ABNORMAL MM HG
POC PO2: 70 MM HG (ref 75–95)
POC PO2: 84.9 MM HG (ref 83–108)
POSITIVE BASE EXCESS, ART: 13 (ref 0–3)
POSITIVE BASE EXCESS, ART: 15 (ref 0–2)
POTASSIUM SERPL-SCNC: 4.3 MMOL/L (ref 3.7–5.3)
RBC # BLD: 5.34 M/UL (ref 4.5–5.9)
RBC # BLD: ABNORMAL 10*6/UL
READ BACK: YES
SAMPLE SITE: ABNORMAL
SEG NEUTROPHILS: 74 %
SEGMENTED NEUTROPHILS ABSOLUTE COUNT: 12.95 K/UL (ref 1.8–7.7)
SODIUM BLD-SCNC: 143 MMOL/L (ref 135–144)
TCO2 (CALC), ART: 43 MMOL/L (ref 22–29)
TCO2 (CALC), ART: 47 MMOL/L (ref 23–28)
TRIGL SERPL-MCNC: 115 MG/DL
WBC # BLD: 17.5 K/UL (ref 3.5–11)
WBC # BLD: ABNORMAL 10*3/UL

## 2017-10-04 PROCEDURE — 94003 VENT MGMT INPAT SUBQ DAY: CPT

## 2017-10-04 PROCEDURE — 6370000000 HC RX 637 (ALT 250 FOR IP): Performed by: INTERNAL MEDICINE

## 2017-10-04 PROCEDURE — 6360000002 HC RX W HCPCS: Performed by: HOSPITALIST

## 2017-10-04 PROCEDURE — 6360000002 HC RX W HCPCS: Performed by: INTERNAL MEDICINE

## 2017-10-04 PROCEDURE — 85025 COMPLETE CBC W/AUTO DIFF WBC: CPT

## 2017-10-04 PROCEDURE — 2500000003 HC RX 250 WO HCPCS: Performed by: EMERGENCY MEDICINE

## 2017-10-04 PROCEDURE — 6370000000 HC RX 637 (ALT 250 FOR IP): Performed by: FAMILY MEDICINE

## 2017-10-04 PROCEDURE — 6360000002 HC RX W HCPCS: Performed by: FAMILY MEDICINE

## 2017-10-04 PROCEDURE — 80048 BASIC METABOLIC PNL TOTAL CA: CPT

## 2017-10-04 PROCEDURE — 94640 AIRWAY INHALATION TREATMENT: CPT

## 2017-10-04 PROCEDURE — 2000000000 HC ICU R&B

## 2017-10-04 PROCEDURE — 6370000000 HC RX 637 (ALT 250 FOR IP): Performed by: STUDENT IN AN ORGANIZED HEALTH CARE EDUCATION/TRAINING PROGRAM

## 2017-10-04 PROCEDURE — 71010 XR CHEST PORTABLE: CPT

## 2017-10-04 PROCEDURE — 94664 DEMO&/EVAL PT USE INHALER: CPT

## 2017-10-04 PROCEDURE — 84478 ASSAY OF TRIGLYCERIDES: CPT

## 2017-10-04 PROCEDURE — 82803 BLOOD GASES ANY COMBINATION: CPT

## 2017-10-04 PROCEDURE — 2580000003 HC RX 258: Performed by: EMERGENCY MEDICINE

## 2017-10-04 PROCEDURE — 99291 CRITICAL CARE FIRST HOUR: CPT | Performed by: INTERNAL MEDICINE

## 2017-10-04 PROCEDURE — 82947 ASSAY GLUCOSE BLOOD QUANT: CPT

## 2017-10-04 PROCEDURE — 6360000002 HC RX W HCPCS: Performed by: EMERGENCY MEDICINE

## 2017-10-04 PROCEDURE — 99211 OFF/OP EST MAY X REQ PHY/QHP: CPT

## 2017-10-04 PROCEDURE — 94762 N-INVAS EAR/PLS OXIMTRY CONT: CPT

## 2017-10-04 PROCEDURE — 6370000000 HC RX 637 (ALT 250 FOR IP): Performed by: HOSPITALIST

## 2017-10-04 PROCEDURE — 76937 US GUIDE VASCULAR ACCESS: CPT

## 2017-10-04 PROCEDURE — S0028 INJECTION, FAMOTIDINE, 20 MG: HCPCS | Performed by: EMERGENCY MEDICINE

## 2017-10-04 PROCEDURE — 94770 HC ETCO2 MONITOR DAILY: CPT

## 2017-10-04 RX ORDER — ALBUTEROL SULFATE 2.5 MG/3ML
2.5 SOLUTION RESPIRATORY (INHALATION) EVERY 4 HOURS PRN
Status: DISCONTINUED | OUTPATIENT
Start: 2017-10-04 | End: 2017-10-07 | Stop reason: HOSPADM

## 2017-10-04 RX ORDER — ASPIRIN 81 MG/1
81 TABLET ORAL DAILY
Status: DISCONTINUED | OUTPATIENT
Start: 2017-10-04 | End: 2017-10-07 | Stop reason: HOSPADM

## 2017-10-04 RX ORDER — LISINOPRIL 5 MG/1
5 TABLET ORAL DAILY
Status: DISCONTINUED | OUTPATIENT
Start: 2017-10-04 | End: 2017-10-07 | Stop reason: HOSPADM

## 2017-10-04 RX ORDER — FLUTICASONE PROPIONATE 50 MCG
1 SPRAY, SUSPENSION (ML) NASAL DAILY PRN
Status: DISCONTINUED | OUTPATIENT
Start: 2017-10-04 | End: 2017-10-07 | Stop reason: HOSPADM

## 2017-10-04 RX ORDER — SERTRALINE HYDROCHLORIDE 25 MG/1
25 TABLET, FILM COATED ORAL DAILY
Status: DISCONTINUED | OUTPATIENT
Start: 2017-10-04 | End: 2017-10-07 | Stop reason: HOSPADM

## 2017-10-04 RX ORDER — SIMVASTATIN 20 MG
20 TABLET ORAL NIGHTLY
Status: DISCONTINUED | OUTPATIENT
Start: 2017-10-04 | End: 2017-10-07 | Stop reason: HOSPADM

## 2017-10-04 RX ADMIN — PROPOFOL 50 MCG/KG/MIN: 10 INJECTION, EMULSION INTRAVENOUS at 06:09

## 2017-10-04 RX ADMIN — METOPROLOL TARTRATE 25 MG: 25 TABLET ORAL at 20:13

## 2017-10-04 RX ADMIN — FENTANYL CITRATE 100 MCG: 50 INJECTION, SOLUTION INTRAMUSCULAR; INTRAVENOUS at 03:46

## 2017-10-04 RX ADMIN — ASPIRIN 81 MG: 81 TABLET, COATED ORAL at 14:52

## 2017-10-04 RX ADMIN — MOMETASONE FUROATE AND FORMOTEROL FUMARATE DIHYDRATE 2 PUFF: 200; 5 AEROSOL RESPIRATORY (INHALATION) at 20:40

## 2017-10-04 RX ADMIN — IPRATROPIUM BROMIDE AND ALBUTEROL SULFATE 1 AMPULE: .5; 3 SOLUTION RESPIRATORY (INHALATION) at 08:26

## 2017-10-04 RX ADMIN — ENOXAPARIN SODIUM 30 MG: 30 INJECTION SUBCUTANEOUS at 08:15

## 2017-10-04 RX ADMIN — Medication 10 ML: at 20:13

## 2017-10-04 RX ADMIN — MICONAZORB AF ANTIFUNGAL POWDER: 1.42 POWDER TOPICAL at 08:11

## 2017-10-04 RX ADMIN — PREDNISONE 40 MG: 20 TABLET ORAL at 08:16

## 2017-10-04 RX ADMIN — FAMOTIDINE 20 MG: 10 INJECTION, SOLUTION INTRAVENOUS at 20:13

## 2017-10-04 RX ADMIN — SERTRALINE 25 MG: 25 TABLET, FILM COATED ORAL at 14:51

## 2017-10-04 RX ADMIN — PROPOFOL 50 MCG/KG/MIN: 10 INJECTION, EMULSION INTRAVENOUS at 04:14

## 2017-10-04 RX ADMIN — SENNA 8.6 MG: 8.6 TABLET, COATED ORAL at 20:13

## 2017-10-04 RX ADMIN — IPRATROPIUM BROMIDE AND ALBUTEROL SULFATE 1 AMPULE: .5; 3 SOLUTION RESPIRATORY (INHALATION) at 20:38

## 2017-10-04 RX ADMIN — PROPOFOL 40 MCG/KG/MIN: 10 INJECTION, EMULSION INTRAVENOUS at 02:30

## 2017-10-04 RX ADMIN — METOPROLOL TARTRATE 25 MG: 25 TABLET ORAL at 14:51

## 2017-10-04 RX ADMIN — SODIUM CHLORIDE: 9 INJECTION, SOLUTION INTRAVENOUS at 06:23

## 2017-10-04 RX ADMIN — ENOXAPARIN SODIUM 30 MG: 30 INJECTION SUBCUTANEOUS at 20:13

## 2017-10-04 RX ADMIN — LISINOPRIL 5 MG: 5 TABLET ORAL at 14:51

## 2017-10-04 RX ADMIN — IPRATROPIUM BROMIDE AND ALBUTEROL SULFATE 1 AMPULE: .5; 3 SOLUTION RESPIRATORY (INHALATION) at 17:01

## 2017-10-04 RX ADMIN — PROPOFOL 50 MCG/KG/MIN: 10 INJECTION, EMULSION INTRAVENOUS at 07:55

## 2017-10-04 RX ADMIN — DOCUSATE SODIUM 100 MG: 50 LIQUID ORAL at 08:16

## 2017-10-04 RX ADMIN — CEFTRIAXONE SODIUM 2 G: 2 INJECTION, POWDER, FOR SOLUTION INTRAMUSCULAR; INTRAVENOUS at 10:19

## 2017-10-04 RX ADMIN — MICONAZORB AF ANTIFUNGAL POWDER: 1.42 POWDER TOPICAL at 20:13

## 2017-10-04 RX ADMIN — FENTANYL CITRATE 100 MCG: 50 INJECTION, SOLUTION INTRAMUSCULAR; INTRAVENOUS at 07:37

## 2017-10-04 RX ADMIN — SIMVASTATIN 20 MG: 20 TABLET, FILM COATED ORAL at 20:13

## 2017-10-04 RX ADMIN — FUROSEMIDE 20 MG: 10 INJECTION, SOLUTION INTRAVENOUS at 08:16

## 2017-10-04 RX ADMIN — FUROSEMIDE 20 MG: 10 INJECTION, SOLUTION INTRAVENOUS at 18:03

## 2017-10-04 RX ADMIN — ALBUTEROL SULFATE 2.5 MG: 2.5 SOLUTION RESPIRATORY (INHALATION) at 03:05

## 2017-10-04 RX ADMIN — FAMOTIDINE 20 MG: 10 INJECTION, SOLUTION INTRAVENOUS at 08:31

## 2017-10-04 ASSESSMENT — PAIN SCALES - GENERAL
PAINLEVEL_OUTOF10: 0
PAINLEVEL_OUTOF10: 6

## 2017-10-04 ASSESSMENT — PAIN DESCRIPTION - LOCATION: LOCATION: OTHER (COMMENT)

## 2017-10-04 ASSESSMENT — PULMONARY FUNCTION TESTS
PIF_VALUE: 37
PIF_VALUE: 14
PIF_VALUE: 32
PIF_VALUE: 32
PIF_VALUE: 34

## 2017-10-04 NOTE — PLAN OF CARE
Problem: RESPIRATORY  Intervention: Respiratory assessment  Attempt to wean unsuccessful per patient does not take spontaneous breaths w/o verbal stimulation.

## 2017-10-04 NOTE — FLOWSHEET NOTE
visited with patient who is intubated but is awake. Per nurse patient may be extubated later today and he is \"weaning\" now. Patient was able to respond to 's questions by nodding his head or using thumbs up. He is eager to have the breathing tube removed. He could not indicate his Denominational preference but he was receptive to prayer. Per nurse patient's family is in [place] and will arrive later today.  chatted with patient, provided prayer and words of comfort. Patient understands that chaplains can return any time if he has a need for support. Chaplains remain available for spiritual or emotional support for patient or family.         10/04/17 1023   Encounter Summary   Services provided to: Patient   Referral/Consult From: [facility] Family members   Continue Visiting (10/4)   Complexity of Encounter Moderate   Length of Encounter 15 minutes   Spiritual Assessment Completed Yes   Routine   Type Initial   Assessment Hopeful   Intervention Active listening   Outcome Coping   Spiritual/Church   Type Spiritual support   Intervention Prayer

## 2017-10-04 NOTE — PLAN OF CARE
Problem: Nutrition  Goal: Optimal nutrition therapy  Outcome: Ongoing  Nutrition Problem: Inadequate oral intake  Intervention: Food and/or Nutrient Delivery: Start oral diet as able  Nutritional Goals: meet % of estimated nutrition needs

## 2017-10-04 NOTE — PROGRESS NOTES
Order obtained for extubation. SpO2 of 98 on 30% FiO2. Patient extubated and placed on 4 liters/min via nasal cannula. Post extubation SpO2 is 94% with HR  106   bpm and RR 20 breaths/min. Patient had moderate cough that was productive of clear  and white sputum. Extubation Well tolerated by patient. .   Breath Sounds: clear sl dim    ARAM R MORTEZA   11:30 AM

## 2017-10-04 NOTE — PROGRESS NOTES
Critical Care Team - Daily Progress Note      Date and time: 10/4/2017 7:39 AM  Patient's name:  Zohaib Barragan  Medical Record Number: [id number]  Patient's account/billing number: [de-identified]  Patient's YOB: 1975  Age: 38 y.o. Date of Admission: 10/2/2017  5:28 AM  Length of stay during current admission: 2      Primary Care Physician: Florin Day MD  ICU Attending Physician: Dr. Jesenia Sahni    Code Status: Full Code    Reason for ICU admission: acute respiratory failure       SUBJECTIVE:     OVERNIGHT EVENTS:     No acute overnight events  Pt is sedated and intuabted  VSS  Plan for anaesthesia holiday and wean trial this AM     Fever:-  Temp (24hrs), Av.4 °F (36.9 °C), Min:98.2 °F (36.8 °C), Max:98.6 °F (37 °C)    Secretions: minimal     Blood pressure:   Vasopressors:none  Systolic (30CQG), DBT:013 , Min:113 , YII:906     Diastolic (48TBN), CHICO:22, Min:40, Max:75      Urine output in the last 24 hours: In: 3376.3 [I.V.:2013. 3; NG/GT:1363]  Out: 2152 [Urine:2152]  Net since admission:-17  Patient Vitals for the past 96 hrs (Last 3 readings):   Weight   10/02/17 0530 (!) 364 lb 3.2 oz (165.2 kg)         Fluids:KVO 10 cc/hr  Feeding:tube feed  Analgesics:fenatnyl prn   Sedation:propofol  DVT Prophylaxis: lovenox  Head Up:yes  GI Prophylaxis:  pepcid  Glycemic Control:adequate 150-200  Spontaneous breathing trial:this morning  Bowel management:colace, senna  Indwelling catheter:abad catheter       AWAKE & FOLLOWING COMMANDS:  [x] No   [] Yes    CURRENT VENTILATION STATUS:     [x] Ventilator  [] BIPAP  [] Nasal Cannula [] Room Air        SECRETIONS Amount:  [x] Small [] Moderate  [] Large  [] None  Color:     [] White [] Colored  [] Bloody    SEDATION:  RAAS Score:  [x] Propofol gtt  [] Versed gtt  [] Ativan gtt   [] No Sedation    PARALYZED:  [x] No    [] Yes    DIARRHEA:                [x] No                [] Yes  (CSerafin Difficile status: [] positive Pressure: 37 cmH2O  I:E Ratio: 1:3.13  Sensitivity: 5  PEEP/CPAP: 5  I Time/ I Time %: 0.9 s  High Pressure Alarm: 60 cmH2O  Low Minute Volume Alarm: 3 L/min  High Respiratory Rate: 40 br/min  Mean Airway Pressure: 12.1 cmH20  Plateau Pressure: 34 cmH20  Static Compliance: 23 mL/cmH2O  Dynamic Compliance: 22 mL/cmH2O  Total PEEP: 12 cmH20  Auto PEEP: 7 cmH20  Additional Respiratory  Assessments  Pulse: 83  Resp: 25  SpO2: 91 %  End Tidal CO2: 50 (%)  Position: Semi-Saucedo's  HME (Heat moisture exchanger): Yes  Oral Care Completed?: Yes  Oral Care: Mouth swabbed, Mouth moisturizer, Mouth suctioned  Subglottic Suction Done?: Yes    ABGs:     Laboratory findings:    Complete Blood Count:   Recent Labs      10/02/17   1239  10/03/17   0428  10/04/17   0405   WBC  13.8*  14.4*  17.5*   HGB  15.2  14.6  15.0   HCT  48.0  45.9  48.2   PLT  224  238  246        Last 3 Blood Glucose:   Recent Labs      10/03/17   0045  10/03/17   0428  10/04/17   0405   GLUCOSE  179*  169*  137*        PT/INR:    Lab Results   Component Value Date    PROTIME 11.8 10/01/2017    INR 1.1 10/01/2017     PTT:    Lab Results   Component Value Date    APTT 25.9 10/01/2017       Comprehensive Metabolic Profile:   Recent Labs      10/02/17   1239  10/03/17   0045  10/03/17   0428  10/03/17   2000  10/04/17   0405   NA  141  138  141   --   Pending   K  4.7  3.5*  3.8  4.5  Pending   CL  94*  93*  95*   --   Pending   CO2  30  34*  34*   --   33*   BUN  25*  27*  27*   --   27*   CREATININE  0.91  1.19  1.15   --   0.89   GLUCOSE  142*  179*  169*   --   137*   CALCIUM  8.8  8.7  8.7   --   8.6   PROT  6.4   --    --    --    --    LABALBU  2.8*   --    --    --    --    BILITOT  0.51  DISREGARD ANSWER   --    --    --    --    ALKPHOS  56   --    --    --    --    AST  32  DISREGARD ANSWER   --    --    --    --    ALT  22  DISREGARD ANSWER   --    --    --    --       Magnesium:   Lab Results   Component Value Date    MG 2.2 10/03/2017 plan and status of the problem list as documented. All events noted and findings confirmed, CXr and vent setting seen and abgs seen  CXR showed bilateral infiltrates and ETT secretions are small  On Rociphen and no fever, abdominal wall cellulitis is better  Sputum gram positive cocci iin clusters and pairs  WBC is increased and on steroids  Urine output is 3.6 L on lasix BID  Currently on SBT so far and TV > 500 and alert and awake and SBT just started and will follow and possible extubation today  Continue abx, lasix and steroids and BD    Discussed with the respiratory therapy and nursing staff  Total critical care time caring for this patient with life threatening, unstable organ failure, including direct patient contact, management of life support systems, review of data including imaging and labs, discussions with other team members and physicians at least 27  Min so far today, excluding procedures.         Demar Romano MD  10/4/2017 9:38 AM

## 2017-10-04 NOTE — PLAN OF CARE
Problem: OXYGENATION/RESPIRATORY FUNCTION  Goal: Patient will maintain patent airway  Outcome: Ongoing  Goal: Patient will achieve/maintain normal respiratory rate/effort  Respiratory rate and effort will be within normal limits for the patient   Outcome: Ongoing

## 2017-10-04 NOTE — PROGRESS NOTES
[facility] Nurse  Consult Note       NAME:  Sayda Connor  MEDICAL RECORD NUMBER:  [id number]  AGE: 38 y.o. GENDER: male  : 1975  TODAY'S DATE:  10/4/2017    Subjective:     Reason for WOCN Evaluation and Assessment: Pressure injury prevention      Sayda Connor is a 38 y.o. male referred by:   [] Physician  [] Nursing  [] Other:     Risk factors[de-identified] diabetes, chronic pressure, decreased mobility, shear force, obesity, immunosuppression and malnutrition        PAST MEDICAL HISTORY        Diagnosis Date    Anxiety     Arthritis     Asthma     childhood asthma    COPD (chronic obstructive pulmonary disease) ([address]     Diabetes mellitus ([address]     GERD (gastroesophageal reflux disease)     Hypertension     Morbid obesity ([address]     Peripheral edema     [facility]acic ascending aortic [facility] [facility] 2015       PAST SURGICAL HISTORY    Past Surgical History:   Procedure Laterality Date    ANTERIOR CRUCIATE LIGAMENT REPAIR Left     ANTERIOR CRUCIATE LIGAMENT REPAIR Left     ARM SURGERY Right     SPLENECTOMY         FAMILY HISTORY    Family History   Problem Relation Age of Onset    Heart Disease Father        SOCIAL HISTORY    Social History   Substance Use Topics    Smoking status: Former Smoker     Packs/day: 1.00     Years: 5.00     Types: Cigarettes     Quit date: 2000    Smokeless tobacco: Never Used      Comment: Quit in . [facility], 3/23/2017.     Alcohol use Yes       ALLERGIES    Allergies   Allergen Reactions    Bee Venom Swelling    Other Hives     Mushrooms           Objective:      BP (!) 116/58  Pulse 96  Temp 98.2 °F (36.8 °C) (Oral)   Resp 22  Ht 6' (1.829 m)  Wt (!) 364 lb 3.2 oz (165.2 kg)  SpO2 95%  BMI 49.39 kg/m2  Rah Risk Score: Rah Scale Score: 15    LABS    CBC:   Lab Results   Component Value Date    WBC 17.5 10/04/2017    RBC 5.34 10/04/2017    HGB 15.0 10/04/2017     CMP:  Albumin:    Lab Results   Component Value Date    LABALBU 2.8 10/02/2017     PT/INR:    Lab Results   Component Value Date    PROTIME 11.8 10/01/2017    INR 1.1 10/01/2017     HgBA1c:    Lab Results   Component Value Date    LABA1C 6.3 03/24/2016     PTT: No components found for: LABPTT      Assessment:     Patient Active Problem List   Diagnosis    [facility] [facility]    Foreign body (FB) in soft tissue    Hypertension    Oropharyngeal candidiasis    Asthma    GERD (gastroesophageal reflux disease)    Anxiety    S/P splenectomy    Acute exacerb[facility] of [facility] pulmonary disease ([facility]D) [facility]    Diabetes mellitus ([facility])    Peripheral edema    Severe obstructive sleep apnea AHI-91.6 - CPAP 13 CWP (C-FLEX-3) 4 L. MIN O2 BLENDED     Acute on chronic respiratory failure with hypoxia and hypercapnia ([facility])    Acute on chronic right heart failure    Mixed restrictive and obstructive lung disease ([facility])    Morbid obesity with BMI of 45.0-49.9, adult ([facility])    Abdominal wall cellulitis    Asthma with acute exacerbation       Measurements:  Redness to buttocks reported. Difficult to turn in current bed due to habitus. Rah score is 15 today   Will request bariatric low air loss bed. Response to treatment:  Well tolerated by patient. Plan:     Plan of Care: Turn every 2 hours  Float heels of of bed with pillows under calves    Mepilex sacrum dressing to sacrococcygeal area. Peel back dressing, inspect skin beneath, re-secure. Change every 72 hours and prn wrinkles, soilage. Discontinue Sacral Mepilex if repeatedly soiled by incontinence. Routine incontinence care with David barrier cloths and zinc oxide cream. Apply zinc oxide cream BID and prn incontinence. Covidien moisture wicking under pads vs cloth backed briefs    Static air overlay. Check inflation each shift by sliding hand under the air overlay. Feel for the patient's heaviest/ most dependant body part.  Ideally 1/2 to 1\" of air will be between your hand and the patient's body. Add air prn.     Specialty Bed Required : Yes   [x] Low Air Loss   [x] Pressure Redistribution  [] Fluid Immersion  [x] Bariatric  [] Total Pressure Relief  [] Other:     Discharge Plan:  TBD    Patient/Caregiver Teaching:  Level of patientunderstanding able to:   :    [] Indicates understanding       [] Needs reinforcement  [] Unsuccessful      [] Verbal Understanding  [] Demonstrated understanding       [] No evidence of learning  [] Refused teaching         [] N/A       Electronically signed by Kelsea Lucero RN, CWON on 10/4/2017 at 10:10 AM

## 2017-10-04 NOTE — PROGRESS NOTES
Nutrition Assessment    Type and Reason for Visit: Reassess    Nutrition Recommendations: Start oral diet as able- suggest advance as tolerated to Carb Controlled diet. Will continue to follow. Nutrition Diagnosis:   · Problem: Inadequate oral intake  · Etiology: related to recent extubation    Signs and symptoms:  as evidenced by NPO status due to medical condition    Nutrition Assessment:  · Subjective Assessment: Pt just extubated. TF off. · Current Nutrition Therapies:  · Tube Feeding: TF discontinued with extubation   · Oral Diet Orders: NPO   · Anthropometric Measures:  · Ht: 6' (182.9 cm)   · Current Body Wt: 364 lb 3.2 oz (165.2 kg)  · Ideal Body Wt: 178 lb (80.7 kg), % Ideal Body 204%  · Adjusted Body Wt: 249 lb 1.9 oz (113 kg)   · BMI Classification: BMI > or equal to 40.0 Obese Class III  · Comparative Standards (Estimated Nutrition Needs):  · Estimated Daily Total Kcal: 6225-3645 kcal per day  · Estimated Daily Protein (g): 120-160 g pro per day     Estimated Intake vs Estimated Needs: Intake Less Than Needs    Nutrition Risk Level: Moderate    Nutrition Interventions:   Start oral diet  Continued Inpatient Monitoring, Education Not Indicated    Nutrition Evaluation:   · Evaluation: Progressing toward goals   · Goals: meet % of estimated nutrition needs     · Monitoring: NPO Status, Diet Progression    See Adult Nutrition Doc Flowsheet for more detail.      Electronically signed by Fidel Schwartz RD, LD on 10/4/17 at 11:57 AM    Contact Number: [phone]

## 2017-10-04 NOTE — PLAN OF CARE
Problem: Risk for Impaired Skin Integrity  Goal: Tissue integrity - skin and mucous membranes  Structural intactness and normal physiological function of skin and  mucous membranes.    Outcome: Ongoing    Problem: Falls - Risk of  Goal: Absence of falls  Outcome: Ongoing    Problem: Restraint Use - Nonviolent/Non-Self-Destructive Behavior:  Goal: Absence of restraint indications  Absence of restraint indications   Outcome: Ongoing  Goal: Absence of restraint-related injury  Absence of restraint-related injury   Outcome: Ongoing    Problem: Nutrition  Goal: Optimal nutrition therapy  Outcome: Ongoing

## 2017-10-05 LAB
ABSOLUTE EOS #: 0.1 K/UL (ref 0–0.4)
ABSOLUTE LYMPH #: 2.4 K/UL (ref 1–4.8)
ABSOLUTE MONO #: 1.1 K/UL (ref 0.1–1.2)
ANION GAP SERPL CALCULATED.3IONS-SCNC: 10 MMOL/L (ref 9–17)
BASOPHILS # BLD: 0 %
BASOPHILS ABSOLUTE: 0 K/UL (ref 0–0.2)
BUN BLDV-MCNC: 27 MG/DL (ref 6–20)
BUN/CREAT BLD: ABNORMAL (ref 9–20)
CALCIUM SERPL-MCNC: 8.7 MG/DL (ref 8.6–10.4)
CHLORIDE BLD-SCNC: 97 MMOL/L (ref 98–107)
CO2: 35 MMOL/L (ref 20–31)
CREAT SERPL-MCNC: 0.77 MG/DL (ref 0.7–1.2)
CULTURE: ABNORMAL
CULTURE: ABNORMAL
DIFFERENTIAL TYPE: ABNORMAL
DIRECT EXAM: ABNORMAL
EOSINOPHILS RELATIVE PERCENT: 1 %
GFR AFRICAN AMERICAN: >60 ML/MIN
GFR NON-AFRICAN AMERICAN: >60 ML/MIN
GFR SERPL CREATININE-BSD FRML MDRD: ABNORMAL ML/MIN/{1.73_M2}
GFR SERPL CREATININE-BSD FRML MDRD: ABNORMAL ML/MIN/{1.73_M2}
GLUCOSE BLD-MCNC: 101 MG/DL (ref 70–99)
GLUCOSE BLD-MCNC: 110 MG/DL (ref 75–110)
GLUCOSE BLD-MCNC: 234 MG/DL (ref 75–110)
GLUCOSE BLD-MCNC: 85 MG/DL (ref 75–110)
GLUCOSE BLD-MCNC: 96 MG/DL (ref 75–110)
GLUCOSE BLD-MCNC: 98 MG/DL (ref 75–110)
HCT VFR BLD CALC: 53.1 % (ref 41–53)
HEMOGLOBIN: 16.1 G/DL (ref 13.5–17.5)
LYMPHOCYTES # BLD: 21 %
Lab: ABNORMAL
MCH RBC QN AUTO: 27.7 PG (ref 26–34)
MCHC RBC AUTO-ENTMCNC: 30.4 G/DL (ref 31–37)
MCV RBC AUTO: 91 FL (ref 80–100)
MONOCYTES # BLD: 10 %
PDW BLD-RTO: 16.7 % (ref 12.5–15.4)
PLATELET # BLD: 233 K/UL (ref 140–450)
PLATELET ESTIMATE: ABNORMAL
PMV BLD AUTO: 9.2 FL (ref 6–12)
POTASSIUM SERPL-SCNC: 4.6 MMOL/L (ref 3.7–5.3)
RBC # BLD: 5.83 M/UL (ref 4.5–5.9)
RBC # BLD: ABNORMAL 10*6/UL
SEG NEUTROPHILS: 68 %
SEGMENTED NEUTROPHILS ABSOLUTE COUNT: 7.9 K/UL (ref 1.8–7.7)
SODIUM BLD-SCNC: 142 MMOL/L (ref 135–144)
SPECIMEN DESCRIPTION: ABNORMAL
STATUS: ABNORMAL
WBC # BLD: 11.5 K/UL (ref 3.5–11)
WBC # BLD: ABNORMAL 10*3/UL

## 2017-10-05 PROCEDURE — 94640 AIRWAY INHALATION TREATMENT: CPT

## 2017-10-05 PROCEDURE — 2500000003 HC RX 250 WO HCPCS: Performed by: EMERGENCY MEDICINE

## 2017-10-05 PROCEDURE — S0028 INJECTION, FAMOTIDINE, 20 MG: HCPCS | Performed by: EMERGENCY MEDICINE

## 2017-10-05 PROCEDURE — 6370000000 HC RX 637 (ALT 250 FOR IP): Performed by: STUDENT IN AN ORGANIZED HEALTH CARE EDUCATION/TRAINING PROGRAM

## 2017-10-05 PROCEDURE — G8989 SELF CARE D/C STATUS: HCPCS

## 2017-10-05 PROCEDURE — G8988 SELF CARE GOAL STATUS: HCPCS

## 2017-10-05 PROCEDURE — 6370000000 HC RX 637 (ALT 250 FOR IP): Performed by: INTERNAL MEDICINE

## 2017-10-05 PROCEDURE — 82947 ASSAY GLUCOSE BLOOD QUANT: CPT

## 2017-10-05 PROCEDURE — 6360000002 HC RX W HCPCS: Performed by: HOSPITALIST

## 2017-10-05 PROCEDURE — G8979 MOBILITY GOAL STATUS: HCPCS

## 2017-10-05 PROCEDURE — 6370000000 HC RX 637 (ALT 250 FOR IP): Performed by: EMERGENCY MEDICINE

## 2017-10-05 PROCEDURE — 99291 CRITICAL CARE FIRST HOUR: CPT | Performed by: INTERNAL MEDICINE

## 2017-10-05 PROCEDURE — 94762 N-INVAS EAR/PLS OXIMTRY CONT: CPT

## 2017-10-05 PROCEDURE — 6370000000 HC RX 637 (ALT 250 FOR IP): Performed by: HOSPITALIST

## 2017-10-05 PROCEDURE — 6370000000 HC RX 637 (ALT 250 FOR IP): Performed by: FAMILY MEDICINE

## 2017-10-05 PROCEDURE — 97165 OT EVAL LOW COMPLEX 30 MIN: CPT

## 2017-10-05 PROCEDURE — 97161 PT EVAL LOW COMPLEX 20 MIN: CPT

## 2017-10-05 PROCEDURE — G8978 MOBILITY CURRENT STATUS: HCPCS

## 2017-10-05 PROCEDURE — 85025 COMPLETE CBC W/AUTO DIFF WBC: CPT

## 2017-10-05 PROCEDURE — 2580000003 HC RX 258: Performed by: EMERGENCY MEDICINE

## 2017-10-05 PROCEDURE — 6360000002 HC RX W HCPCS: Performed by: EMERGENCY MEDICINE

## 2017-10-05 PROCEDURE — 1200000000 HC SEMI PRIVATE

## 2017-10-05 PROCEDURE — 36415 COLL VENOUS BLD VENIPUNCTURE: CPT

## 2017-10-05 PROCEDURE — G8980 MOBILITY D/C STATUS: HCPCS

## 2017-10-05 PROCEDURE — 94660 CPAP INITIATION&MGMT: CPT

## 2017-10-05 PROCEDURE — 80048 BASIC METABOLIC PNL TOTAL CA: CPT

## 2017-10-05 PROCEDURE — G8987 SELF CARE CURRENT STATUS: HCPCS

## 2017-10-05 RX ORDER — FUROSEMIDE 10 MG/ML
40 INJECTION INTRAMUSCULAR; INTRAVENOUS DAILY
Status: DISCONTINUED | OUTPATIENT
Start: 2017-10-06 | End: 2017-10-06

## 2017-10-05 RX ORDER — DIPHENHYDRAMINE HCL 25 MG
50 TABLET ORAL ONCE
Status: COMPLETED | OUTPATIENT
Start: 2017-10-05 | End: 2017-10-05

## 2017-10-05 RX ORDER — CEPHALEXIN 500 MG/1
500 CAPSULE ORAL EVERY 12 HOURS SCHEDULED
Status: DISCONTINUED | OUTPATIENT
Start: 2017-10-05 | End: 2017-10-07 | Stop reason: HOSPADM

## 2017-10-05 RX ADMIN — MOMETASONE FUROATE AND FORMOTEROL FUMARATE DIHYDRATE 2 PUFF: 200; 5 AEROSOL RESPIRATORY (INHALATION) at 10:15

## 2017-10-05 RX ADMIN — IPRATROPIUM BROMIDE AND ALBUTEROL SULFATE 1 AMPULE: .5; 3 SOLUTION RESPIRATORY (INHALATION) at 16:41

## 2017-10-05 RX ADMIN — METOPROLOL TARTRATE 25 MG: 25 TABLET ORAL at 09:17

## 2017-10-05 RX ADMIN — LISINOPRIL 5 MG: 5 TABLET ORAL at 09:17

## 2017-10-05 RX ADMIN — Medication 10 ML: at 20:30

## 2017-10-05 RX ADMIN — INSULIN LISPRO 4 UNITS: 100 INJECTION, SOLUTION INTRAVENOUS; SUBCUTANEOUS at 18:29

## 2017-10-05 RX ADMIN — SIMVASTATIN 20 MG: 20 TABLET, FILM COATED ORAL at 20:29

## 2017-10-05 RX ADMIN — MICONAZORB AF ANTIFUNGAL POWDER: 1.42 POWDER TOPICAL at 20:29

## 2017-10-05 RX ADMIN — ENOXAPARIN SODIUM 30 MG: 30 INJECTION SUBCUTANEOUS at 09:18

## 2017-10-05 RX ADMIN — DIPHENHYDRAMINE HCL 50 MG: 25 TABLET ORAL at 03:40

## 2017-10-05 RX ADMIN — METOPROLOL TARTRATE 25 MG: 25 TABLET ORAL at 20:29

## 2017-10-05 RX ADMIN — FUROSEMIDE 20 MG: 10 INJECTION, SOLUTION INTRAVENOUS at 09:16

## 2017-10-05 RX ADMIN — DOCUSATE SODIUM 100 MG: 50 LIQUID ORAL at 09:16

## 2017-10-05 RX ADMIN — ENOXAPARIN SODIUM 30 MG: 30 INJECTION SUBCUTANEOUS at 20:29

## 2017-10-05 RX ADMIN — CEPHALEXIN 500 MG: 500 CAPSULE ORAL at 12:34

## 2017-10-05 RX ADMIN — SENNA 8.6 MG: 8.6 TABLET, COATED ORAL at 20:29

## 2017-10-05 RX ADMIN — SERTRALINE 25 MG: 25 TABLET, FILM COATED ORAL at 09:16

## 2017-10-05 RX ADMIN — PREDNISONE 40 MG: 20 TABLET ORAL at 09:16

## 2017-10-05 RX ADMIN — SODIUM CHLORIDE: 9 INJECTION, SOLUTION INTRAVENOUS at 05:28

## 2017-10-05 RX ADMIN — FAMOTIDINE 20 MG: 10 INJECTION, SOLUTION INTRAVENOUS at 20:29

## 2017-10-05 RX ADMIN — Medication 10 ML: at 09:17

## 2017-10-05 RX ADMIN — ASPIRIN 81 MG: 81 TABLET, COATED ORAL at 09:17

## 2017-10-05 RX ADMIN — CEFTRIAXONE SODIUM 2 G: 2 INJECTION, POWDER, FOR SOLUTION INTRAMUSCULAR; INTRAVENOUS at 05:28

## 2017-10-05 RX ADMIN — CEPHALEXIN 500 MG: 500 CAPSULE ORAL at 20:30

## 2017-10-05 RX ADMIN — IPRATROPIUM BROMIDE AND ALBUTEROL SULFATE 1 AMPULE: .5; 3 SOLUTION RESPIRATORY (INHALATION) at 20:29

## 2017-10-05 RX ADMIN — MOMETASONE FUROATE AND FORMOTEROL FUMARATE DIHYDRATE 2 PUFF: 200; 5 AEROSOL RESPIRATORY (INHALATION) at 20:29

## 2017-10-05 RX ADMIN — IPRATROPIUM BROMIDE AND ALBUTEROL SULFATE 1 AMPULE: .5; 3 SOLUTION RESPIRATORY (INHALATION) at 13:33

## 2017-10-05 RX ADMIN — MICONAZORB AF ANTIFUNGAL POWDER: 1.42 POWDER TOPICAL at 09:17

## 2017-10-05 RX ADMIN — FAMOTIDINE 20 MG: 10 INJECTION, SOLUTION INTRAVENOUS at 09:16

## 2017-10-05 ASSESSMENT — PAIN SCALES - GENERAL
PAINLEVEL_OUTOF10: 0
PAINLEVEL_OUTOF10: 0

## 2017-10-05 NOTE — PROGRESS NOTES
Critical care team - Resident sign-out to medicine service      Date and time: 10/5/2017 11:12 AM  Patient's name:  Yoana Meraz  Medical Record Number: [id number]  Patient's account/billing number: [de-identified]  Patient's YOB: 1975  Age: 38 y.o. Date of Admission: 10/2/2017  5:28 AM  Length of stay during current admission: 3    Primary Care Physician: Rafaela Alvarado MD    Code Status: Full Code    Mode of physician to physician communication:        [x] Via telephone   [] In person     Date and time of sign-out: 10/5/2017 11:12 AM    Accepting Internal Medicine resident: Dr. Fatimah Martin    Accepting Medicine team: IM Team Med 4    Accepting team's attending: Dr. Glory Ward    Patient's current ICU Bed:  115    Patient's assigned bed on floor:  318      [x] Med-Surg Monitored [] Step-down       [] Psychiatry ICU       [] Psych floor     Reason for ICU admission:   SOB    ICU course summary:   38year old male from [address] [place] [address] with a 2 week history of SOB and malaise, and worsening scrotal and peripheral edema. He was started on solu-medrol, lasix, duoneb, albuterol. He was on the medicine floor when he became more confused and ripped off his mask. He was retaining CO2. He was intubated and brought to the ICU. He received rocephin, lasix, BD and was extubated on 10/5/2017. Additionally, he had abdominal wall cellulitis which is improving. Patient also has a history of a stable 5.3 cm ascending thoracic ascending aneurysm.     Procedures during patient's ICU stay:   intubation    Current Vitals:     /63  Pulse 89  Temp 98.1 °F (36.7 °C) (Oral)   Resp 22  Ht 6' (1.829 m)  Wt (!) 352 lb 11.8 oz (160 kg)  SpO2 93%  BMI 47.84 kg/m2      Cultures:       Blood cultures:      [] None drawn      [] Negative             []  Positive (Details:  )  Urine Culture:        [] None drawn      [] Negative             []  Positive (Details:  )  Sputum Culture:   [] None drawn       [] Negative

## 2017-10-05 NOTE — PROGRESS NOTES
PATIENT REFUSES TO WEAR BIPAP     [x] Risks and benefits explained to patient   [x] Patient refuses to wear Bipap stating \"I usually tear it off in the night\" Will have nurse call if needs. \" I breathing better now then I have in a long time. \"  [x] Patient verbalizes understanding of information presented. Nurse notified of patients decision.

## 2017-10-05 NOTE — PROGRESS NOTES
aeration [x]  Expiratory Wheezing and or moderately diminished []  Insp/Exp wheeze and/or very diminished []  Insp/Exp and/ or marked distress   Respiratory Rate   []  Patient Baseline []  Less than 20 []  Less than 20 [x]  20-25 []  Greater than 25 []  Greater than 25   Peak flow % of Pred or PB [x]  NA   []  Greater than 90%  []  81-90% []  71-80% []  Less than or equal to 70%  or unable to perform []  Unable due to Respiratory Distress   Dyspnea re []  Patient Baseline []  No SOB []  No SOB [x]  SOB on exertion []  SOB min activity []  At rest/acute   e FEV% Predicted       [x]  NA []  Above 69%  []  Unable []  Above 60-69%  []  Unable []  Above 50-59%  []  Unable []  Above 35-49%  []  Unable []  Less than 35%  []  Unable            Pt just extubated   []  Hyperinflation Assessment  Score 1 2 3   CXR and Breath Sounds   []  Clear []  No atelectasis  Basilar aeration []  Atelectasis or absent basilar breath sounds   Incentive Spirometry Volume  (Per IBW)   []  Greater than or equal to 15ml/Kg []  less than 15ml/Kg []  less than 15ml/Kg   Surgery within last 2 weeks []  None or general   []  Abdominal or thoracic surgery  []  Abdominal or thoracic   Chronic Pulmonary Historyre []  No []  Yes []  Yes     []  Secretion Management Assessment  Score 1 2 3   Bilateral Breath Sounds   []  Occasional Rhonchi []  Scattered Rhonchi []  Course Rhonchi and/or poor aeration   Sputum    []  Small amount of thin secretions []  Moderate amount of viscous secretions []  Copius, Viscious Yellow/ Secretions   CXR as reported by physician []  clear  []  Unavailable []  Infiltrates and/or consolidation  []  Unavailable []  Mucus Plugging and or lobar consolidation  []  Unavailable   Cough []  Strong, productive cough []  Weak productive cough []  No cough or weak non-productive cough   YOSSI GARZA  1:38 PM                            FEMALE                                  MALE                            FEV1 Predicted Normal Values FEV1 Predicted Normal Values          Age                                     Height in Feet and Inches       Age                                     Height in Feet and Inches       4' 11\" 5' 1\" 5' 3\" 5' 5\" 5' 7\" 5' 9\" 5' 11\" 6' 1\"  4' 11\" 5' 1\" 5' 3\" 5' 5\" 5' 7\" 5' 9\" 5' 11\" 6' 1\"   43 - 45 2.49 2.66 2.84 3.03 3.22 3.42 3.62 3.83 42 - 45 2.82 3.03 3.26 3.49 3.72 3.96 4.22 4.47   46 - 49 2.40 2.57 2.76 2.94 3.14 3.33 3.54 3.75 46 - 49 2.70 2.92 3.14 3.37 3.61 3.85 4.10 4.36   50 - 53 2.31 2.48 2.66 2.85 3.04 3.24 3.45 3.66 50 - 53 2.58 2.80 3.02 3.25 3.49 3.73 3.98 4.24   54 - 57 2.21 2.38 2.57 2.75 2.95 3.14 3.35 3.56 54 - 57 2.46 2.67 2.89 3.12 3.36 3.60 3.85 4.11   58 - 61 2.10 2.28 2.46 2.65 2.84 3.04 3.24 3.45 58 - 61 2.32 2.54 2.76 2.99 3.23 3.47 3.72 3.98   62 - 65 1.99 2.17 2.35 2.54 2.73 2.93 3.13 3.34 62 - 65 2.19 2.40 2.62 2.85 3.09 3.33 3.58 3.84   66 - 69 1.88 2.05 2.23 2.42 2.61 2.81 3.02 3.23 66 - 69 2.04 2.26 2.48 2.71 2.95 3.19 3.44 3.70   70+ 1.82 1.99 2.17 2.36 2.55 2.75 2.95 3.16 70+ 1.97 2.19 2.41 2.64 2.87 3.12 3.37 3.62             Predicted Peak Expiratory Flow Rate                                       Height (in)  Female       Height (in) Male           Age 52 49 11 56 95 06 47 79 Age            20 351 197 950 284 089 914 357 143  25 45 34 86 31 75 07 70 76   25 337 352 366 381 396 411 426 441 25 447 476 [phone] [phone]   30 329 344 359 374 389 404 419 434 30 437 466 494 523 552 [phone] 667   35 322 337 351 366 381 396 411 426 35 426 455 484 512 541 [phone]   40 314 329 344 359 374 389 404 419 40 416 445 473 502 531 559 588 617 647   45 307 322 336 351 366 381 396 411 45 405 434 463 491 520 549 577 606 636   50 299 314 329 344 359 374 389 404 50 395 424 452 481 510 [phone] 625   55 292 307 321 336 351 366 381 396 55 384 413 442 470 499 [phone] 615   60 284 299 314 329 344 359 374 389 60 374 403 431 460 489 [phone] 605   65 277 292 306

## 2017-10-05 NOTE — PROGRESS NOTES
Physical Therapy    [address]  Initial Assessment    NAME: Fernando Davey  : 1975  MRN: [id number]    Date of Service: 10/5/2017    Patient Diagnosis(es): There were no encounter diagnoses. has a past medical history of Anxiety; Arthritis; Asthma; COPD (chronic obstructive pulmonary disease) ([facility] Diabetes mellitus ([facility] GERD (gastroesophageal reflux disease); Hypertension; Morbid obesity ([facility] Peripheral edema; and Thoracic ascending aortic aneurysm ([facility] has a past surgical history that includes Splenectomy; Anterior cruciate ligament repair (Left); Arm Surgery (Right); and Anterior cruciate ligament repair (Left).     Restrictions     Vision/Hearing  Vision: Within Functional Limits  Hearing: Within functional limits     Subjective  General  Chart Reviewed: Yes  Patient assessed for rehabilitation services?: Yes  Family / Caregiver Present: No  Follows Commands: Within Functional Limits  Pain Screening  Patient Currently in Pain: No  Vital Signs  Patient Currently in Pain: No       Orientation  Orientation  Overall Orientation Status: Within Functional Limits    Social/Functional History  Social/Functional History  Lives With: Alone  Type of Home: Trailer  Home Layout: One level  Home Access:  (2 steps)  Bathroom Shower/Tub: Tub/Shower unit  Bathroom Accessibility: Accessible  ADL Assistance: Independent  Homemaking Assistance: Independent  Ambulation Assistance: Independent  Transfer Assistance: Independent  Active : Yes  Mode of Transportation:  (4 vehicles - (2) car, truck, jeep)  Occupation:  (Currently employed/working)  Type of occupation: Makes tires  Objective     Observation/Palpation  Posture: Fair    AROM RLE (degrees)  RLE AROM: WFL  AROM LLE (degrees)  LLE AROM : WFL  AROM RUE (degrees)  RUE AROM : WFL  AROM LUE (degrees)  LUE AROM : WFL  Strength RLE  Strength RLE: WFL  Strength LLE  Strength LLE: WFL  Strength RUE  Strength RUE: WFL  Strength LUE  Strength co-signing PT who agrees with all evaluation/treatment and documentation.

## 2017-10-05 NOTE — PROGRESS NOTES
Q4H PRN   fluticasone 1 spray Daily PRN   potassium chloride 10 mEq PRN   fentaNYL 100 mcg Q1H PRN   Or     fentanNYL 50 mcg Q1H PRN   sodium chloride flush 10 mL PRN   acetaminophen 650 mg Q4H PRN   magnesium hydroxide 30 mL Daily PRN   ondansetron 4 mg Q6H PRN   glucose 15 g PRN   dextrose 12.5 g PRN   glucagon (rDNA) 1 mg PRN   dextrose 100 mL/hr PRN   sodium phosphate IVPB 0.16 mmol/kg PRN   Or     sodium phosphate IVPB 0.32 mmol/kg PRN         VENT SETTINGS (Comprehensive) (if applicable):  NA    ABGs:     Laboratory findings:    Complete Blood Count:   Recent Labs      10/02/17   1239  10/03/17   0428  10/04/17   0405   WBC  13.8*  14.4*  17.5*   HGB  15.2  14.6  15.0   HCT  48.0  45.9  48.2   PLT  224  238  246        Last 3 Blood Glucose:   Recent Labs      10/03/17   0428  10/04/17   0405  10/05/17   0524   GLUCOSE  169*  137*  101*        PT/INR:    Lab Results   Component Value Date    PROTIME 11.8 10/01/2017    INR 1.1 10/01/2017     PTT:    Lab Results   Component Value Date    APTT 25.9 10/01/2017       Comprehensive Metabolic Profile:   Recent Labs      10/02/17   1239   10/03/17   0428  10/03/17   2000  10/04/17   0405  10/05/17   0524   NA  141   < >  141   --   143  142   K  4.7   < >  3.8  4.5  4.3  4.6   CL  94*   < >  95*   --   98  97*   CO2  30   < >  34*   --   33*  35*   BUN  25*   < >  27*   --   27*  27*   CREATININE  0.91   < >  1.15   --   0.89  0.77   GLUCOSE  142*   < >  169*   --   137*  101*   CALCIUM  8.8   < >  8.7   --   8.6  8.7   PROT  6.4   --    --    --    --    --    LABALBU  2.8*   --    --    --    --    --    BILITOT  0.51  DISREGARD ANSWER   --    --    --    --    --    ALKPHOS  56   --    --    --    --    --    AST  32  DISREGARD ANSWER   --    --    --    --    --    ALT  22  DISREGARD ANSWER   --    --    --    --    --     < > = values in this interval not displayed.       Magnesium:   Lab Results   Component Value Date    MG 2.2 10/03/2017     Phosphorus:   Lab Results   Component Value Date    PHOS 1.8 10/02/2017     Ionized Calcium:   Lab Results   Component Value Date    CAION 1.09 10/03/2017        Urinalysis:     Troponin: No results for input(s): TROPONINI in the last 72 hours. Microbiology:    Cultures during this admission:     Blood cultures:                 [] None drawn      [x] Negative             []  Positive (Details:  )  Urine Culture:                   [] None drawn      [x] Negative             []  Positive (Details:  )  Sputum Culture:               [] None drawn       [] Negative             [x]  Positive (Details: MODERATE GRAM POSITIVE COCCI IN PAIRS AND CLUSTERS (A)  Endotracheal aspirate:     [x] None drawn       [] Negative             []  Positive (Details:  )     Other pertinent Labs:       Radiology/Imaging:     Chest Xray (10/5/2017):  none    ASSESSMENT:     Active Problems:    Ascending [facility] aneurysm ([facility]    Hypertension    S/P splenectomy    Diabetes mellitus ([facility]    Severe obstructive sleep apnea AHI-91.6 - CPAP 13 CWP (C-FLEX-3) 4 L. MIN O2 BLENDED     Acute on chronic respiratory failure with hypoxia and hypercapnia (HCC)    Acute on chronic right heart failure    Mixed restrictive and obstructive lung disease ([facility]    Morbid obesity with BMI of 45.0-49.9, adult ([facility]    Abdominal wall cellulitis    Asthma with acute exacerbation    Active Hospital Problems    Diagnosis Date Noted    Acute on chronic right heart failure [I50.813] 10/02/2017    Mixed restrictive and obstructive lung disease ([facility] [J44.9, J98.4] 10/02/2017    Morbid obesity with BMI of 45.0-49.9, adult ([facility] [E66.01, Z68.42] 10/02/2017    Abdominal wall cellulitis [N76.576] 10/02/2017    Asthma with acute exacerbation [J45.901] 10/02/2017    Acute on chronic respiratory failure with hypoxia and hypercapnia (HCC) [S50.07, J96.22] 10/01/2017    Severe obstructive sleep apnea AHI-91.6 - CPAP 13 CWP (C-FLEX-3) 4 L. MIN O2 BLENDED  [G47.33] 08/02/2017    [facility] [E11.9]     S/P splenectomy [Z90.81] 07/21/2015    Hypertension [I10]     Ascending aortic aneurysm ([facility] [I71.2]    Echo noted from yesterday compared to March 2017, unchanged aortic dilation  [facility]   Over lap syndrome   PLAN:     WEAN PER PROTOCOL:  [] No   [] Yes  [x] N/A    DISCONTINUE ANY LABS:   [x] No   [] Yes    ICU PROPHYLAXIS:  Stress ulcer:  [x] PPI Agent  [] [id number] [] Sucralfate  [] Other:  VTE:   [x] Enoxaparin  [] Unfract. Heparin Subcut  [] EPC Cuffs    NUTRITION:  [] NPO [] Tube Feeding (Specify: ) [] TPN  [x] PO (Diet: DIET CARDIAC;)    HOME MEDICATIONS RECONCILED: [] No  [x] Yes    INSULIN DRIP:   [x] No   [] Yes    CONSULTATION NEEDED:  [x] No   [] Yes    FAMILY UPDATED:    [x] No   [] Yes    TRANSFER OUT OF ICU:   [x] No   [] Yes    ADDITIONAL PLAN:    1. Acute on chronic hypercapnic hypoxic Respiratory failure   Stable extubated today    Wean O2 as tolerated    Keep O2 sat > 88%    2. Moderate persistent asthma with exacerbation   Continue prednisone 40 PO for 3 more days   Continue aerosols     3. Acute on chronic right heart CHF    Switch Lasix to 40 mg PO QD   KVO   Avoid fluid overload   Monitor UOP    4. Abdominal wall cellulitis resolving   D/c rocephin and start keflex 500 mg BID for three days    5. Diabetes mellitus type 2   Continue ISS   BG adequately controlled          Flower Arriola M.D. Critical care resident,  Department of Internal Medicine/ Critical care  [facility])             10/5/2017, 7:36 AM  Attending Physician Statement  I have discussed the care of Jason Marinelli, including pertinent history and exam findings,  with the resident. I have seen and examined the patient and the key elements of all parts of the encounter have been performed by me. I agree with the assessment, plan and orders as documented by the resident with additions .   Extubated yest doing better sats dec to 70 % when he take off oxygen which he does when he wants to go to restroom . Continues to take off o2 even when asked by rn not to do so   Did not use bipap overnight   On further discussion he is not compliant with CPAP at home   Plan   Change lasix to od   Cellulitis is better change to po keflex   Complete steroids   Cont dulera   Home o2 eval   D/w pt in detail that with his joe and ohs and asthma he will benefit with use of cpap and encourage complaince but he is resistent   Ok to floor will follow for pulmonary        Total critical care time caring for this patient with life threatening, unstable organ failure, including direct patient contact, management of life support systems, review of data including imaging and labs, discussions with other team members and physicians at least 27   Min so far today, excluding procedures. Treatment plan Discussed with nursing staff in detail , all questions answered . Imtiaz Hubbard MD   10/5/2017   3:24 PM    Please note that this chart was generated using voice recognition Dragon dictation software. Although every effort was made to ensure the accuracy of this automated transcription, some errors in transcription may have occurred.

## 2017-10-05 NOTE — PROGRESS NOTES
Occupational Therapy   Occupational Therapy Initial Assessment  Date: 10/5/2017   Patient Name: Jason Marinelli  MRN: [id number]     : 1975    Patient Diagnosis(es): There were no encounter diagnoses. has a past medical history of Anxiety; Arthritis; Asthma; COPD (chronic obstructive pulmonary disease) ([facility] Diabetes mellitus ([facility] GERD (gastroesophageal reflux disease); Hypertension; [facility]bid [facility] Peripheral edema; and Thoracic ascending aortic aneurysm ([facility] has a past surgical history that includes Splenectomy; Anterior cruciate ligament repair (Left); Arm Surgery (Right); and Anterior cruciate ligament repair (Left).            Restrictions  Restrictions/Precautions  Restrictions/Precautions: General Precautions  Required Braces or Orthoses?: No  Position Activity Restriction  Other position/activity restrictions: up with assist    Subjective   General  Patient assessed for rehabilitation services?: Yes  Family / Caregiver Present: No  Diagnosis: Acute respiratory failure  Pain Assessment  Patient Currently in Pain: Denies  Oxygen Therapy  SpO2: 92 %  Social/Functional History  Social/Functional History  Lives With: Alone  Type of Home: Trailer  Home Layout: One level  Home Access: Stairs to enter without rails  Entrance Stairs - Number of Steps: 2  Bathroom Shower/Tub: Tub/Shower unit  Bathroom Toilet: Standard  Bathroom Equipment: Shower chair  Bathroom Accessibility: Accessible  Home Equipment: Radha Borne  ADL Assistance: Independent  Homemaking Assistance: Independent  Homemaking Responsibilities: Yes  Ambulation Assistance: Independent  Transfer Assistance: Independent  Active : Yes  Mode of Transportation: Car, SUV, Truck  Occupation: Full time employment  Type of occupation: [employer]  Leisure & Hobbies: drag racing     Objective   Vision: Within Functional Limits  Hearing: Within functional limits    Orientation  Overall Orientation Status: Within Normal precautions in place;Call light within reach  Restraints  Initially in place: No        Discharge Recommendations:  Home with assist PRN     G-Code  OT G-codes  Functional Assessment Tool Used: [facility]  Score: 24/24  Functional Limitation: Self care  Self Care Current Status (): 0 percent impaired, limited or restricted  Self Care Goal Status (): 0 percent impaired, limited or restricted  Self Care Discharge Status (): 0 percent impaired, limited or restricted    Goals  Short term goals  Time Frame for Short term goals: OT eval and discharge d/t (I)     Therapy Time   Individual Concurrent Group Co-treatment   Time In 1543         Time Out 1558         Minutes 15            Pt seated on EOB with call light within reach upon arrival. Pt performed ADL LB dressing task with mod I before transferring to standing. Pt demo'd func mob around floor with no AD and no LOB or safety concerns noted while pushing IV pole. Pt returned to room and transferred on/off toilet before returning to EOB (I). Pt stated feeling like at baseline or better, however, pt states health has declined over past 3 years. Pt slightly SOB after func mob, however, pt at 94% O2 on monitor upon return to room. Pt retired seated in bed with call light within reach upon exit. Discharge recommendations discussed with patient during initial evaluation.     IVAN Amin/L

## 2017-10-06 LAB
ABSOLUTE EOS #: 0.12 K/UL (ref 0–0.4)
ABSOLUTE LYMPH #: 2.64 K/UL (ref 1–4.8)
ABSOLUTE MONO #: 0.96 K/UL (ref 0.1–0.8)
ANION GAP SERPL CALCULATED.3IONS-SCNC: 6 MMOL/L (ref 9–17)
BASOPHILS # BLD: 0 %
BASOPHILS ABSOLUTE: 0 K/UL (ref 0–0.2)
BUN BLDV-MCNC: 25 MG/DL (ref 6–20)
BUN/CREAT BLD: ABNORMAL (ref 9–20)
CALCIUM SERPL-MCNC: 9.2 MG/DL (ref 8.6–10.4)
CHLORIDE BLD-SCNC: 98 MMOL/L (ref 98–107)
CO2: 39 MMOL/L (ref 20–31)
CREAT SERPL-MCNC: 0.95 MG/DL (ref 0.7–1.2)
DIFFERENTIAL TYPE: ABNORMAL
EOSINOPHILS RELATIVE PERCENT: 1 %
ESTIMATED AVERAGE GLUCOSE: 169 MG/DL
GFR AFRICAN AMERICAN: >60 ML/MIN
GFR NON-AFRICAN AMERICAN: >60 ML/MIN
GFR SERPL CREATININE-BSD FRML MDRD: ABNORMAL ML/MIN/{1.73_M2}
GFR SERPL CREATININE-BSD FRML MDRD: ABNORMAL ML/MIN/{1.73_M2}
GLUCOSE BLD-MCNC: 144 MG/DL (ref 75–110)
GLUCOSE BLD-MCNC: 147 MG/DL (ref 75–110)
GLUCOSE BLD-MCNC: 191 MG/DL (ref 75–110)
GLUCOSE BLD-MCNC: 94 MG/DL (ref 75–110)
GLUCOSE BLD-MCNC: 98 MG/DL (ref 70–99)
HBA1C MFR BLD: 7.5 % (ref 4–6)
HCT VFR BLD CALC: 48.8 % (ref 41–53)
HEMOGLOBIN: 15.3 G/DL (ref 13.5–17.5)
LYMPHOCYTES # BLD: 22 %
MCH RBC QN AUTO: 28.2 PG (ref 26–34)
MCHC RBC AUTO-ENTMCNC: 31.4 G/DL (ref 31–37)
MCV RBC AUTO: 89.9 FL (ref 80–100)
MONOCYTES # BLD: 8 %
MORPHOLOGY: ABNORMAL
PDW BLD-RTO: 16 % (ref 12.5–15.4)
PLATELET # BLD: 221 K/UL (ref 140–450)
PLATELET ESTIMATE: ABNORMAL
PMV BLD AUTO: 9.1 FL (ref 6–12)
POTASSIUM SERPL-SCNC: 4.6 MMOL/L (ref 3.7–5.3)
RBC # BLD: 5.43 M/UL (ref 4.5–5.9)
RBC # BLD: ABNORMAL 10*6/UL
SEG NEUTROPHILS: 69 %
SEGMENTED NEUTROPHILS ABSOLUTE COUNT: 8.28 K/UL (ref 1.8–7.7)
SODIUM BLD-SCNC: 143 MMOL/L (ref 135–144)
WBC # BLD: 12 K/UL (ref 3.5–11)
WBC # BLD: ABNORMAL 10*3/UL

## 2017-10-06 PROCEDURE — 94640 AIRWAY INHALATION TREATMENT: CPT

## 2017-10-06 PROCEDURE — 36415 COLL VENOUS BLD VENIPUNCTURE: CPT

## 2017-10-06 PROCEDURE — 6370000000 HC RX 637 (ALT 250 FOR IP): Performed by: FAMILY MEDICINE

## 2017-10-06 PROCEDURE — 6370000000 HC RX 637 (ALT 250 FOR IP): Performed by: INTERNAL MEDICINE

## 2017-10-06 PROCEDURE — 94660 CPAP INITIATION&MGMT: CPT

## 2017-10-06 PROCEDURE — 80048 BASIC METABOLIC PNL TOTAL CA: CPT

## 2017-10-06 PROCEDURE — 1200000000 HC SEMI PRIVATE

## 2017-10-06 PROCEDURE — S0028 INJECTION, FAMOTIDINE, 20 MG: HCPCS | Performed by: EMERGENCY MEDICINE

## 2017-10-06 PROCEDURE — 6360000002 HC RX W HCPCS: Performed by: STUDENT IN AN ORGANIZED HEALTH CARE EDUCATION/TRAINING PROGRAM

## 2017-10-06 PROCEDURE — 99223 1ST HOSP IP/OBS HIGH 75: CPT | Performed by: INTERNAL MEDICINE

## 2017-10-06 PROCEDURE — 2500000003 HC RX 250 WO HCPCS: Performed by: EMERGENCY MEDICINE

## 2017-10-06 PROCEDURE — 6370000000 HC RX 637 (ALT 250 FOR IP): Performed by: HOSPITALIST

## 2017-10-06 PROCEDURE — 6360000002 HC RX W HCPCS: Performed by: HOSPITALIST

## 2017-10-06 PROCEDURE — 2580000003 HC RX 258: Performed by: EMERGENCY MEDICINE

## 2017-10-06 PROCEDURE — 82947 ASSAY GLUCOSE BLOOD QUANT: CPT

## 2017-10-06 PROCEDURE — 94762 N-INVAS EAR/PLS OXIMTRY CONT: CPT

## 2017-10-06 PROCEDURE — 83036 HEMOGLOBIN GLYCOSYLATED A1C: CPT

## 2017-10-06 PROCEDURE — 6360000002 HC RX W HCPCS: Performed by: EMERGENCY MEDICINE

## 2017-10-06 PROCEDURE — 6370000000 HC RX 637 (ALT 250 FOR IP): Performed by: STUDENT IN AN ORGANIZED HEALTH CARE EDUCATION/TRAINING PROGRAM

## 2017-10-06 PROCEDURE — 6370000000 HC RX 637 (ALT 250 FOR IP): Performed by: EMERGENCY MEDICINE

## 2017-10-06 PROCEDURE — 85025 COMPLETE CBC W/AUTO DIFF WBC: CPT

## 2017-10-06 RX ORDER — IPRATROPIUM BROMIDE AND ALBUTEROL SULFATE 2.5; .5 MG/3ML; MG/3ML
1 SOLUTION RESPIRATORY (INHALATION) 3 TIMES DAILY
Status: DISCONTINUED | OUTPATIENT
Start: 2017-10-06 | End: 2017-10-07 | Stop reason: HOSPADM

## 2017-10-06 RX ORDER — FUROSEMIDE 10 MG/ML
40 INJECTION INTRAMUSCULAR; INTRAVENOUS 2 TIMES DAILY
Status: DISCONTINUED | OUTPATIENT
Start: 2017-10-06 | End: 2017-10-07 | Stop reason: HOSPADM

## 2017-10-06 RX ADMIN — INSULIN LISPRO 1 UNITS: 100 INJECTION, SOLUTION INTRAVENOUS; SUBCUTANEOUS at 17:53

## 2017-10-06 RX ADMIN — IPRATROPIUM BROMIDE AND ALBUTEROL SULFATE 1 AMPULE: .5; 3 SOLUTION RESPIRATORY (INHALATION) at 08:36

## 2017-10-06 RX ADMIN — IPRATROPIUM BROMIDE AND ALBUTEROL SULFATE 1 AMPULE: .5; 3 SOLUTION RESPIRATORY (INHALATION) at 11:29

## 2017-10-06 RX ADMIN — FUROSEMIDE 40 MG: 10 INJECTION, SOLUTION INTRAMUSCULAR; INTRAVENOUS at 17:52

## 2017-10-06 RX ADMIN — ENOXAPARIN SODIUM 30 MG: 30 INJECTION SUBCUTANEOUS at 08:59

## 2017-10-06 RX ADMIN — CEPHALEXIN 500 MG: 500 CAPSULE ORAL at 09:00

## 2017-10-06 RX ADMIN — SENNA 8.6 MG: 8.6 TABLET, COATED ORAL at 21:05

## 2017-10-06 RX ADMIN — CEPHALEXIN 500 MG: 500 CAPSULE ORAL at 21:05

## 2017-10-06 RX ADMIN — Medication 10 ML: at 21:06

## 2017-10-06 RX ADMIN — SIMVASTATIN 20 MG: 20 TABLET, FILM COATED ORAL at 21:05

## 2017-10-06 RX ADMIN — DOCUSATE SODIUM 100 MG: 50 LIQUID ORAL at 08:59

## 2017-10-06 RX ADMIN — LISINOPRIL 5 MG: 5 TABLET ORAL at 09:00

## 2017-10-06 RX ADMIN — Medication 10 ML: at 09:01

## 2017-10-06 RX ADMIN — MOMETASONE FUROATE AND FORMOTEROL FUMARATE DIHYDRATE 2 PUFF: 200; 5 AEROSOL RESPIRATORY (INHALATION) at 21:21

## 2017-10-06 RX ADMIN — INSULIN LISPRO 1 UNITS: 100 INJECTION, SOLUTION INTRAVENOUS; SUBCUTANEOUS at 12:34

## 2017-10-06 RX ADMIN — INSULIN LISPRO 1 UNITS: 100 INJECTION, SOLUTION INTRAVENOUS; SUBCUTANEOUS at 21:16

## 2017-10-06 RX ADMIN — METOPROLOL TARTRATE 25 MG: 25 TABLET ORAL at 21:05

## 2017-10-06 RX ADMIN — FAMOTIDINE 20 MG: 10 INJECTION, SOLUTION INTRAVENOUS at 08:59

## 2017-10-06 RX ADMIN — FAMOTIDINE 20 MG: 10 INJECTION, SOLUTION INTRAVENOUS at 21:05

## 2017-10-06 RX ADMIN — PREDNISONE 40 MG: 20 TABLET ORAL at 09:01

## 2017-10-06 RX ADMIN — FUROSEMIDE 40 MG: 10 INJECTION, SOLUTION INTRAMUSCULAR; INTRAVENOUS at 08:59

## 2017-10-06 RX ADMIN — IPRATROPIUM BROMIDE AND ALBUTEROL SULFATE 1 AMPULE: .5; 3 SOLUTION RESPIRATORY (INHALATION) at 21:21

## 2017-10-06 RX ADMIN — MICONAZORB AF ANTIFUNGAL POWDER: 1.42 POWDER TOPICAL at 21:05

## 2017-10-06 RX ADMIN — SERTRALINE 25 MG: 25 TABLET, FILM COATED ORAL at 09:02

## 2017-10-06 RX ADMIN — ASPIRIN 81 MG: 81 TABLET, COATED ORAL at 09:00

## 2017-10-06 RX ADMIN — ENOXAPARIN SODIUM 30 MG: 30 INJECTION SUBCUTANEOUS at 21:05

## 2017-10-06 RX ADMIN — MOMETASONE FUROATE AND FORMOTEROL FUMARATE DIHYDRATE 2 PUFF: 200; 5 AEROSOL RESPIRATORY (INHALATION) at 08:36

## 2017-10-06 RX ADMIN — METOPROLOL TARTRATE 25 MG: 25 TABLET ORAL at 09:01

## 2017-10-06 ASSESSMENT — PAIN SCALES - GENERAL: PAINLEVEL_OUTOF10: 0

## 2017-10-06 NOTE — H&P
10/01/17 (!) 362 lb 8 oz (164.4 kg)   08/07/17 (!) 355 lb (161 kg)       General appearance: alert and cooperative with exam  HEENT: Head: Normocephalic, no lesions, without obvious abnormality.   Neck: no adenopathy, no carotid bruit, no JVD, supple, symmetrical, trachea midline and thyroid not enlarged, symmetric, no tenderness/mass/nodules  Lungs: clear to auscultation bilaterally  Heart: regular rate and rhythm, S1, S2 normal, no murmur, click, rub or gallop  Abdomen: soft, non-tender; bowel sounds normal; no masses,  no organomegaly, obese abdomen  Extremities: edema 2-3+ in legs B/L. atraumatic  Neurologic: Mental status: Alert, oriented, thought content appropriate    Medications:Current Inpatient  Scheduled Meds:   influenza virus vaccine  0.5 mL Intramuscular Once    furosemide  40 mg Intravenous Daily    cephALEXin  500 mg Oral 2 times per day    lisinopril  5 mg Oral Daily    metoprolol tartrate  25 mg Oral BID    sertraline  25 mg Oral Daily    simvastatin  20 mg Oral Nightly    mometasone-formoterol  2 puff Inhalation BID    aspirin EC  81 mg Oral Daily    docusate  100 mg Per NG tube Daily    predniSONE  40 mg Oral Daily    miconazole   Topical BID    insulin lispro  0-12 Units Subcutaneous Q6H    sodium chloride flush  10 mL Intravenous 2 times per day    famotidine (PEPCID) injection  20 mg Intravenous BID    ipratropium-albuterol  1 ampule Inhalation 4x daily    enoxaparin  30 mg Subcutaneous BID    senna  1 tablet Oral Nightly     Continuous Infusions:   dextrose      sodium chloride 10 mL/hr at 10/05/17 0528     PRN Meds:albuterol, fluticasone, potassium chloride, fentaNYL **OR** fentanNYL, sodium chloride flush, acetaminophen, magnesium hydroxide, ondansetron, glucose, dextrose, glucagon (rDNA), dextrose, sodium phosphate IVPB **OR** sodium phosphate IVPB    Objective:    CBC:   Recent Labs      10/03/17   0428  10/04/17   0405  10/05/17   1023   WBC  14.4*  17.5*  11.5* disease (COPD) ([facility] [address]    Diabetes mellitus ([facility] [address]    Peripheral edema    Severe obstructive sleep apnea AHI-91.6 - CPAP 13 CWP (C-FLEX-3) 4 L. MIN O2 BLENDED     Acute on chronic respiratory failure with hypoxia and hypercapnia (HCC)    Acute on chronic right heart failure    Mixed restrictive and obstructive lung disease ([facility] [address]    Morbid obesity with BMI of 45.0-49.9, adult (HCC)    Abdominal wall cellulitis    Asthma with acute exacerbation         Plan:    Cellulitis  F/U WBC - trending down (17.5--->11.5)  Will cont Keflex 500 PO BID x 3 days  F/U BMP and CBC. RHF and DM2  Lasix 40  Lisinopril 5 OD  Metoprolol 25 BID  Simvastatin 20 OD    COPD  On 3L via NC when visited, uses cpap at home, pt now on CPAP. Cont. Prednisone x 5days (day 3 today) and Resp treatment. Candida Felix MD  PGY-1, Internal Medicine  [facility]  Attending Physician Statement  Patient seen and examined  I have discussed the care of the patient, including pertinent history and exam findings,  with the resident. I have reviewed the key elements of all parts of the encounter with the resident. I agree with the assessment, plan and orders as documented by the resident.     Mitchell Bear

## 2017-10-06 NOTE — PROGRESS NOTES
Wheezing and or moderately diminished []  Insp/Exp wheeze and/or very diminished []  Insp/Exp and/ or marked distress   Respiratory Rate   []  Patient Baseline []  Less than 20 [x]  Less than 20 []  20-25 []  Greater than 25 []  Greater than 25   Peak flow % of Pred or PB [x]  NA   []  Greater than 90%  []  81-90% []  71-80% []  Less than or equal to 70%  or unable to perform []  Unable due to Respiratory Distress   Dyspnea re []  Patient Baseline []  No SOB [x]  No SOB []  SOB on exertion []  SOB min activity []  At rest/acute   e FEV% Predicted       []  NA []  Above 69%  []  Unable []  Above 60-69%  []  Unable []  Above 50-59%  []  Unable []  Above 35-49%  []  Unable []  Less than 35%  []  Unable                 []  Hyperinflation Assessment  Score 1 2 3   CXR and Breath Sounds   []  Clear []  No atelectasis  Basilar aeration []  Atelectasis or absent basilar breath sounds   Incentive Spirometry Volume  (Per IBW)   []  Greater than or equal to 15ml/Kg []  less than 15ml/Kg []  less than 15ml/Kg   Surgery within last 2 weeks []  None or general   []  Abdominal or thoracic surgery  []  Abdominal or thoracic   Chronic Pulmonary Historyre []  No []  Yes []  Yes     []  Secretion Management Assessment  Score 1 2 3   Bilateral Breath Sounds   []  Occasional Rhonchi []  Scattered Rhonchi []  Course Rhonchi and/or poor aeration   Sputum    []  Small amount of thin secretions []  Moderate amount of viscous secretions []  Copius, Viscious Yellow/ Secretions   CXR as reported by physician []  clear  []  Unavailable []  Infiltrates and/or consolidation  []  Unavailable []  Mucus Plugging and or lobar consolidation  []  Unavailable   Cough []  Strong, productive cough []  Weak productive cough []  No cough or weak non-productive cough   MELSISA HERZOG  11:40 AM                            FEMALE                                  MALE                            FEV1 Predicted Normal Values                        FEV1 Predicted Normal Values          Age                                     Height in Feet and Inches       Age                                     Height in Feet and Inches       4' 11\" 5' 1\" 5' 3\" 5' 5\" 5' 7\" 5' 9\" 5' 11\" 6' 1\"  4' 11\" 5' 1\" 5' 3\" 5' 5\" 5' 7\" 5' 9\" 5' 11\" 6' 1\"   43 - 45 2.49 2.66 2.84 3.03 3.22 3.42 3.62 3.83 42 - 45 2.82 3.03 3.26 3.49 3.72 3.96 4.22 4.47   46 - 49 2.40 2.57 2.76 2.94 3.14 3.33 3.54 3.75 46 - 49 2.70 2.92 3.14 3.37 3.61 3.85 4.10 4.36   50 - 53 2.31 2.48 2.66 2.85 3.04 3.24 3.45 3.66 50 - 53 2.58 2.80 3.02 3.25 3.49 3.73 3.98 4.24   54 - 57 2.21 2.38 2.57 2.75 2.95 3.14 3.35 3.56 54 - 57 2.46 2.67 2.89 3.12 3.36 3.60 3.85 4.11   58 - 61 2.10 2.28 2.46 2.65 2.84 3.04 3.24 3.45 58 - 61 2.32 2.54 2.76 2.99 3.23 3.47 3.72 3.98   62 - 65 1.99 2.17 2.35 2.54 2.73 2.93 3.13 3.34 62 - 65 2.19 2.40 2.62 2.85 3.09 3.33 3.58 3.84   66 - 69 1.88 2.05 2.23 2.42 2.61 2.81 3.02 3.23 66 - 69 2.04 2.26 2.48 2.71 2.95 3.19 3.44 3.70   70+ 1.82 1.99 2.17 2.36 2.55 2.75 2.95 3.16 70+ 1.97 2.19 2.41 2.64 2.87 3.12 3.37 3.62             Predicted Peak Expiratory Flow Rate                                       Height (in)  Female       Height (in) Male           Age 40 93 27 27 61 91 56 79 Age            20 393 830 773 712 760 175 023 462  20 60 31 27 95 26 02 73 76   25 337 352 366 381 396 411 426 441 25 447 476 505 533 562 591 [phone]   30 329 344 359 374 389 404 419 434 30 437 466 494 523 [phone] 667   35 322 337 351 366 381 396 411 426 35 426 455 484 512 [phone] 657   40 314 329 344 359 374 389 404 419 40 416 445 473 502 531 559 588 617 647   45 307 322 336 351 366 381 396 411 45 405 434 463 491 520 549 577 606 636   50 299 314 329 344 359 374 389 404 50 395 424 452 481 510 538 567 596 625   55 292 307 321 336 351 366 381 396 55 384 413 442 470 499 528 556 585 615   60 284 299 314 329 344 359 374 389 60 374 403 431 460 489 517 546 575 605   65 277 292 306 321 336 351 366 381 65 363 392 421 449 478 507 535 564 594   70 269 284 299 314 329 344 498 945 48 098 631 260 964 702 260 204 736 583   75 261 274 289 305 319 334 348 364 75 344 372 400 429 458 487 515 544 573   80 253 266 282 296 312 327 342 356 80 335 362 390 419 448 476 [phone]

## 2017-10-06 NOTE — PROGRESS NOTES
IM RESIDENT PROGRESS NOTE        Patient:  Desmond Mott  YOB: 1975    MRN: [id number]     Acct: [de-identified]     Admit date: 10/2/2017    Subjective: Patient seen and evaluated at the bedside. And chart reviewed. No acute events over night. No new complaints in the morning. Bicarb is 39. Suspecting Overlap Syndrome.     Diet:  DIET CARDIAC;      Medications:Current Inpatient    Scheduled Meds:   insulin lispro  0-6 Units Subcutaneous TID WC    insulin lispro  0-3 Units Subcutaneous Nightly    ipratropium-albuterol  1 ampule Inhalation TID    influenza virus vaccine  0.5 mL Intramuscular Once    furosemide  40 mg Intravenous Daily    cephALEXin  500 mg Oral 2 times per day    lisinopril  5 mg Oral Daily    metoprolol tartrate  25 mg Oral BID    sertraline  25 mg Oral Daily    simvastatin  20 mg Oral Nightly    mometasone-formoterol  2 puff Inhalation BID    aspirin EC  81 mg Oral Daily    docusate  100 mg Per NG tube Daily    predniSONE  40 mg Oral Daily    miconazole   Topical BID    sodium chloride flush  10 mL Intravenous 2 times per day    famotidine (PEPCID) injection  20 mg Intravenous BID    enoxaparin  30 mg Subcutaneous BID    senna  1 tablet Oral Nightly     Continuous Infusions:   dextrose      sodium chloride 10 mL/hr at 10/05/17 0528     PRN Meds:albuterol, fluticasone, potassium chloride, fentaNYL **OR** fentanNYL, sodium chloride flush, acetaminophen, magnesium hydroxide, ondansetron, glucose, dextrose, glucagon (rDNA), dextrose, sodium phosphate IVPB **OR** sodium phosphate IVPB    Objective:    Physical Exam:  Vitals: /77  Pulse 102  Temp 97.5 °F (36.4 °C) (Oral)   Resp 17  Ht 6' (1.829 m)  Wt (!) 352 lb (159.7 kg)  SpO2 93%  BMI 47.74 kg/m2  24 hour intake/output:    Intake/Output Summary (Last 24 hours) at 10/06/17 1240  Last data filed at 10/06/17 1218   Gross per 24 hour Intake              981 ml   Output             2850 ml   Net            -1869 ml     Last 3 weights: Wt Readings from Last 3 Encounters:   10/06/17 (!) 352 lb (159.7 kg)   10/01/17 (!) 362 lb 8 oz (164.4 kg)   08/07/17 (!) 355 lb (161 kg)       Physical Examination:   General appearance: alert and cooperative with exam  HEENT: Head: Normocephalic, no lesions, without obvious abnormality. Neck: no adenopathy, no carotid bruit, no JVD, supple, symmetrical, trachea midline and thyroid not enlarged, symmetric, no tenderness/mass/nodules  Lungs: clear to auscultation bilaterally  Heart: regular rate and rhythm, S1, S2 normal, no murmur, click, rub or gallop  Abdomen: soft, non-tender; bowel sounds normal; no masses,  no organomegaly, obese abdomen  Extremities: edema 2-3+ in legs B/L. atraumatic  Neurologic: Mental status: Alert, oriented, thought content appropriate  Labs:-    CBC:   Recent Labs      10/04/17   0405  10/05/17   1023  10/06/17   0644   WBC  17.5*  11.5*  12.0*   HGB  15.0  16.1  15.3   PLT  246  233  221     BMP:    Recent Labs      10/04/17   0405  10/05/17   0524  10/06/17   0644   NA  143  142  143   K  4.3  4.6  4.6   CL  98  97*  98   CO2  33*  35*  39*   BUN  27*  27*  25*   CREATININE  0.89  0.77  0.95   GLUCOSE  137*  101*  98     Calcium:  Recent Labs      10/06/17   0644   CALCIUM  9.2     Ionized Calcium:No results for input(s): IONCA in the last 72 hours. Magnesium:  Recent Labs      10/03/17   2000   MG  2.2     Phosphorus:No results for input(s): PHOS in the last 72 hours. BNP:No results for input(s): BNP in the last 72 hours. Glucose:  Recent Labs      10/05/17   2344  10/06/17   0613  10/06/17   1131   POCGLU  85  94  144*     HgbA1C: No results for input(s): LABA1C in the last 72 hours. INR: No results for input(s): INR in the last 72 hours. Hepatic: No results for input(s): ALKPHOS, ALT, AST, PROT, BILITOT, BILIDIR, LABALBU in the last 72 hours.   Amylase and Lipase:No results for input(s): LACTA, AMYLASE in the last 72 hours. Lactic Acid: No results for input(s): LACTA in the last 72 hours. CARDIAC ENZYMES:No results for input(s): CKTOTAL, CKMB, CKMBINDEX, TROPONINI in the last 72 hours. BNP: No results for input(s): BNP in the last 72 hours. Lipids:   Recent Labs      10/04/17   0405   TRIG  115     ABGs: No results found for: PH, PCO2, PO2, HCO3, O2SAT  Thyroid: No results found for: TSH   Urinalysis:   Color, UA   Date Value Ref Range Status   10/02/2017 YELLOW YEL Final     pH, UA   Date Value Ref Range Status   10/02/2017 5.5 5.0 - 8.0 Final     Specific Gravity, UA   Date Value Ref Range Status   10/02/2017 1.018 1.005 - 1.030 Final     Protein, UA   Date Value Ref Range Status   10/02/2017 2+ (A) NEG Final     RBC, UA   Date Value Ref Range Status   10/02/2017 2 TO 5 0 - 2 /HPF Final     Bacteria, UA   Date Value Ref Range Status   10/02/2017 NOT REPORTED NONE Final     Nitrite, Urine   Date Value Ref Range Status   10/02/2017 NEGATIVE NEG Final     WBC, UA   Date Value Ref Range Status   10/02/2017 2 TO 5 0 - 5 /HPF Final     Leukocyte Esterase, Urine   Date Value Ref Range Status   10/02/2017 TRACE (A) NEG Final     Yeast, UA   Date Value Ref Range Status   10/02/2017 NOT REPORTED NONE Final     Glucose, Ur   Date Value Ref Range Status   10/02/2017 NEGATIVE NEG Final     Bilirubin Urine   Date Value Ref Range Status   10/02/2017 NEGATIVE NEG Final         Assessment:  Active Problems:    Ascending aortic aneurysm (HCC)    Hypertension    S/P splenectomy    Diabetes mellitus (HCC)    Severe obstructive sleep apnea AHI-91.6 - CPAP 13 CWP (C-FLEX-3) 4 L. MIN O2 BLENDED     Acute on chronic respiratory failure with hypoxia and hypercapnia (HCC)    Acute on chronic right heart failure    Mixed restrictive and obstructive lung disease ([facility] [address]    Morbid obesity with BMI of 45.0-49.9, adult ([facility])    Abdominal wall cellulitis    Asthma with acute

## 2017-10-06 NOTE — PLAN OF CARE
Problem: OXYGENATION/RESPIRATORY FUNCTION  Goal: Patient will achieve/maintain normal respiratory rate/effort  Respiratory rate and effort will be within normal limits for the patient   Outcome: Ongoing  Patients respiratory status stable at this time. No s/s distress or cyanosis noted. Respirations even, easy and regular. Nasal canula 02 in place as needed to maintain sp02>90% or per md order,pt on continuous pulse oximetry. Will continue to monitor. Problem: Falls - Risk of  Goal: Absence of falls  Outcome: Ongoing  No falls to date. Bed in lowest position with call light within reach. Floor free from obstacles and pt verbalizes understanding to call out with needs or assistance with ambulation. Falls risk score evaluated- medium risk. Bed alarm not applicable at this time, falling star posted. Will continue to monitor additional needs.

## 2017-10-06 NOTE — PROGRESS NOTES
Pt with bright red blood with bowel movement. Dr Nuha Houser paged to see patient as was notified of BM with blood.

## 2017-10-07 VITALS
WEIGHT: 315 LBS | DIASTOLIC BLOOD PRESSURE: 67 MMHG | OXYGEN SATURATION: 95 % | HEART RATE: 89 BPM | HEIGHT: 72 IN | SYSTOLIC BLOOD PRESSURE: 116 MMHG | BODY MASS INDEX: 42.66 KG/M2 | TEMPERATURE: 98.1 F | RESPIRATION RATE: 20 BRPM

## 2017-10-07 LAB
ABSOLUTE EOS #: 0.1 K/UL (ref 0–0.4)
ABSOLUTE LYMPH #: 2.9 K/UL (ref 1–4.8)
ABSOLUTE MONO #: 1.5 K/UL (ref 0.1–1.2)
ANION GAP SERPL CALCULATED.3IONS-SCNC: 12 MMOL/L (ref 9–17)
BASOPHILS # BLD: 0 %
BASOPHILS ABSOLUTE: 0.1 K/UL (ref 0–0.2)
BUN BLDV-MCNC: 27 MG/DL (ref 6–20)
BUN/CREAT BLD: ABNORMAL (ref 9–20)
CALCIUM SERPL-MCNC: 8.9 MG/DL (ref 8.6–10.4)
CHLORIDE BLD-SCNC: 97 MMOL/L (ref 98–107)
CO2: 32 MMOL/L (ref 20–31)
CREAT SERPL-MCNC: 0.84 MG/DL (ref 0.7–1.2)
DIFFERENTIAL TYPE: ABNORMAL
EOSINOPHILS RELATIVE PERCENT: 1 %
GFR AFRICAN AMERICAN: >60 ML/MIN
GFR NON-AFRICAN AMERICAN: >60 ML/MIN
GFR SERPL CREATININE-BSD FRML MDRD: ABNORMAL ML/MIN/{1.73_M2}
GFR SERPL CREATININE-BSD FRML MDRD: ABNORMAL ML/MIN/{1.73_M2}
GLUCOSE BLD-MCNC: 104 MG/DL (ref 70–99)
GLUCOSE BLD-MCNC: 104 MG/DL (ref 75–110)
GLUCOSE BLD-MCNC: 146 MG/DL (ref 75–110)
GLUCOSE BLD-MCNC: 221 MG/DL (ref 75–110)
HCT VFR BLD CALC: 49.3 % (ref 41–53)
HEMOGLOBIN: 15.6 G/DL (ref 13.5–17.5)
LYMPHOCYTES # BLD: 21 %
MCH RBC QN AUTO: 28.6 PG (ref 26–34)
MCHC RBC AUTO-ENTMCNC: 31.6 G/DL (ref 31–37)
MCV RBC AUTO: 90.5 FL (ref 80–100)
MONOCYTES # BLD: 11 %
PDW BLD-RTO: 16.3 % (ref 12.5–15.4)
PLATELET # BLD: 229 K/UL (ref 140–450)
PLATELET ESTIMATE: ABNORMAL
PMV BLD AUTO: 9.6 FL (ref 6–12)
POTASSIUM SERPL-SCNC: 4.4 MMOL/L (ref 3.7–5.3)
RBC # BLD: 5.45 M/UL (ref 4.5–5.9)
RBC # BLD: ABNORMAL 10*6/UL
SEG NEUTROPHILS: 67 %
SEGMENTED NEUTROPHILS ABSOLUTE COUNT: 8.9 K/UL (ref 1.8–7.7)
SODIUM BLD-SCNC: 141 MMOL/L (ref 135–144)
WBC # BLD: 13.4 K/UL (ref 3.5–11)
WBC # BLD: ABNORMAL 10*3/UL

## 2017-10-07 PROCEDURE — 82947 ASSAY GLUCOSE BLOOD QUANT: CPT

## 2017-10-07 PROCEDURE — 6360000002 HC RX W HCPCS: Performed by: HOSPITALIST

## 2017-10-07 PROCEDURE — 99239 HOSP IP/OBS DSCHRG MGMT >30: CPT | Performed by: INTERNAL MEDICINE

## 2017-10-07 PROCEDURE — 6360000002 HC RX W HCPCS: Performed by: STUDENT IN AN ORGANIZED HEALTH CARE EDUCATION/TRAINING PROGRAM

## 2017-10-07 PROCEDURE — 6370000000 HC RX 637 (ALT 250 FOR IP): Performed by: EMERGENCY MEDICINE

## 2017-10-07 PROCEDURE — 80048 BASIC METABOLIC PNL TOTAL CA: CPT

## 2017-10-07 PROCEDURE — 6370000000 HC RX 637 (ALT 250 FOR IP): Performed by: FAMILY MEDICINE

## 2017-10-07 PROCEDURE — 2580000003 HC RX 258: Performed by: EMERGENCY MEDICINE

## 2017-10-07 PROCEDURE — 6370000000 HC RX 637 (ALT 250 FOR IP): Performed by: INTERNAL MEDICINE

## 2017-10-07 PROCEDURE — S0028 INJECTION, FAMOTIDINE, 20 MG: HCPCS | Performed by: EMERGENCY MEDICINE

## 2017-10-07 PROCEDURE — 36415 COLL VENOUS BLD VENIPUNCTURE: CPT

## 2017-10-07 PROCEDURE — 6370000000 HC RX 637 (ALT 250 FOR IP): Performed by: STUDENT IN AN ORGANIZED HEALTH CARE EDUCATION/TRAINING PROGRAM

## 2017-10-07 PROCEDURE — 2500000003 HC RX 250 WO HCPCS: Performed by: EMERGENCY MEDICINE

## 2017-10-07 PROCEDURE — 94660 CPAP INITIATION&MGMT: CPT

## 2017-10-07 PROCEDURE — 85025 COMPLETE CBC W/AUTO DIFF WBC: CPT

## 2017-10-07 PROCEDURE — 94762 N-INVAS EAR/PLS OXIMTRY CONT: CPT

## 2017-10-07 PROCEDURE — 94640 AIRWAY INHALATION TREATMENT: CPT

## 2017-10-07 RX ORDER — CEPHALEXIN 500 MG/1
500 CAPSULE ORAL EVERY 12 HOURS SCHEDULED
Qty: 4 CAPSULE | Refills: 0 | Status: SHIPPED | OUTPATIENT
Start: 2017-10-07 | End: 2017-10-09

## 2017-10-07 RX ORDER — FUROSEMIDE 40 MG/1
20 TABLET ORAL 2 TIMES DAILY
Qty: 14 TABLET | Refills: 1 | Status: SHIPPED | OUTPATIENT
Start: 2017-10-07 | End: 2017-11-07

## 2017-10-07 RX ORDER — CEPHALEXIN 500 MG/1
500 CAPSULE ORAL EVERY 12 HOURS SCHEDULED
Qty: 2 CAPSULE | Refills: 0 | Status: CANCELLED | OUTPATIENT
Start: 2017-10-07 | End: 2017-10-08

## 2017-10-07 RX ADMIN — PREDNISONE 40 MG: 20 TABLET ORAL at 09:54

## 2017-10-07 RX ADMIN — MICONAZORB AF ANTIFUNGAL POWDER: 1.42 POWDER TOPICAL at 09:55

## 2017-10-07 RX ADMIN — FUROSEMIDE 40 MG: 10 INJECTION, SOLUTION INTRAMUSCULAR; INTRAVENOUS at 09:53

## 2017-10-07 RX ADMIN — DOCUSATE SODIUM 100 MG: 50 LIQUID ORAL at 09:55

## 2017-10-07 RX ADMIN — ASPIRIN 81 MG: 81 TABLET, COATED ORAL at 09:54

## 2017-10-07 RX ADMIN — IPRATROPIUM BROMIDE AND ALBUTEROL SULFATE 1 AMPULE: .5; 3 SOLUTION RESPIRATORY (INHALATION) at 16:28

## 2017-10-07 RX ADMIN — SERTRALINE 25 MG: 25 TABLET, FILM COATED ORAL at 09:54

## 2017-10-07 RX ADMIN — INSULIN LISPRO 2 UNITS: 100 INJECTION, SOLUTION INTRAVENOUS; SUBCUTANEOUS at 13:37

## 2017-10-07 RX ADMIN — IPRATROPIUM BROMIDE AND ALBUTEROL SULFATE 1 AMPULE: .5; 3 SOLUTION RESPIRATORY (INHALATION) at 10:08

## 2017-10-07 RX ADMIN — ENOXAPARIN SODIUM 30 MG: 30 INJECTION SUBCUTANEOUS at 09:53

## 2017-10-07 RX ADMIN — Medication 10 ML: at 09:56

## 2017-10-07 RX ADMIN — FAMOTIDINE 20 MG: 10 INJECTION, SOLUTION INTRAVENOUS at 09:53

## 2017-10-07 RX ADMIN — CEPHALEXIN 500 MG: 500 CAPSULE ORAL at 09:54

## 2017-10-07 RX ADMIN — METOPROLOL TARTRATE 25 MG: 25 TABLET ORAL at 09:54

## 2017-10-07 RX ADMIN — MOMETASONE FUROATE AND FORMOTEROL FUMARATE DIHYDRATE 2 PUFF: 200; 5 AEROSOL RESPIRATORY (INHALATION) at 10:08

## 2017-10-07 RX ADMIN — LISINOPRIL 5 MG: 5 TABLET ORAL at 09:54

## 2017-10-07 NOTE — CARE COORDINATION
Multiple attempts have been made to obtain face to face and scripts for Home O2. Have received the Script but the face to face has not been completed. [facility] without face to face. Spoke to Rosa he said to send what notes we have , and they would send a tank to go home with.

## 2017-10-07 NOTE — PROGRESS NOTES
IM RESIDENT PROGRESS NOTE        Patient:  Anna Quinones  YOB: 1975    MRN: [id number]     Acct: [de-identified]     Admit date: 10/2/2017    Subjective: Patient seen and evaluated at the bedside. And chart reviewed. No acute events over night. No new complaints in the morning. Bicarb is 39. Suspecting Overlap Syndrome. Vitals WNL. Hemodynamically stable.          Diet:  DIET CARDIAC;      Medications:Current Inpatient    Scheduled Meds:   insulin lispro  0-6 Units Subcutaneous TID WC    insulin lispro  0-3 Units Subcutaneous Nightly    ipratropium-albuterol  1 ampule Inhalation TID    furosemide  40 mg Intravenous BID    influenza virus vaccine  0.5 mL Intramuscular Once    cephALEXin  500 mg Oral 2 times per day    lisinopril  5 mg Oral Daily    metoprolol tartrate  25 mg Oral BID    sertraline  25 mg Oral Daily    simvastatin  20 mg Oral Nightly    mometasone-formoterol  2 puff Inhalation BID    aspirin EC  81 mg Oral Daily    docusate  100 mg Per NG tube Daily    miconazole   Topical BID    sodium chloride flush  10 mL Intravenous 2 times per day    famotidine (PEPCID) injection  20 mg Intravenous BID    enoxaparin  30 mg Subcutaneous BID    senna  1 tablet Oral Nightly     Continuous Infusions:   dextrose      sodium chloride 10 mL/hr at 10/05/17 0528     PRN Meds:albuterol, fluticasone, potassium chloride, fentaNYL **OR** fentanNYL, sodium chloride flush, acetaminophen, magnesium hydroxide, ondansetron, glucose, dextrose, glucagon (rDNA), dextrose, sodium phosphate IVPB **OR** sodium phosphate IVPB    Objective:    Physical Exam:  Vitals: /61   Pulse 105   Temp 97.6 °F (36.4 °C)   Resp 22   Ht 6' (1.829 m)   Wt (!) 352 lb (159.7 kg)   SpO2 92%   BMI 47.74 kg/m²   24 hour intake/output:    Intake/Output Summary (Last 24 hours) at 10/07/17 1208  Last data filed at 10/07/17 0600   Gross per 24 hour Asthma with acute exacerbation      Plan:  Cellulitis  F/U WBC - trending down (17.5--->11.5)  Will cont Keflex 500 PO BID x 3 days  F/U BMP and CBC.     RHF and DM2  Lasix 40  Lisinopril 5 OD  Metoprolol 25 BID  Simvastatin 20 OD  Add Lasix 40 BID.     Overlap Syndrome  On 3L via NC when visited, uses cpap at home, pt now on CPAP. Riley Jayden. Cont. Prednisone x 5days (day 5 today at 9pm) and Resp treatment. Bicarb is 39. F/U Home O2 Eval    Patient wishing to go home today. Will send home with home oxygen and 1 day of prednisone and Abx Pt seen and discussed with attending provider. Will discharge with with home oxygen. Lety Almanzar MD  PGY-1, Internal Medicine  [id number]       I have discussed the care of Teresa Camacho , including pertinent history and exam findings,    today with the resident. I have seen and examined the patient and the key elements of all parts of the encounter have been performed by me . I agree with the assessment, plan and orders as documented by the resident. Home today . Attending Physician Statement  I have reviewed and edited the discharge summary of  Teresa Camacho AS NEEDED  ,   including pertinent history and exam findings. I have reviewed the key elements of all parts of the discharge summary . I agree with the information and plans as documented by the resident. Time spent on discharge planning ;          [] less than 30 minutes . [x]   more  than 30 minutes . Electronically signed by Bc Velazquez MD on 10/7/2017 .        Electronically signed by Bc Velazquez MD

## 2017-10-07 NOTE — PLAN OF CARE
BRONCHOSPASM/BRONCHOCONSTRICTION     [x]         IMPROVE AERATION/BREATH SOUNDS  [x]   ADMINISTER BRONCHODILATOR THERAPY AS APPROPRIATE  [x]   ASSESS BREATH SOUNDS  [x]   IMPLEMENT AEROSOL/MDI PROTOCOL  [x]   PATIENT EDUCATION AS NEEDED

## 2017-10-08 LAB
CULTURE: NORMAL
CULTURE: NORMAL
Lab: NORMAL
SPECIMEN DESCRIPTION: NORMAL
STATUS: NORMAL

## 2017-10-09 ENCOUNTER — TELEPHONE (OUTPATIENT)
Dept: INTERNAL MEDICINE | Age: 42
End: 2017-10-09

## 2017-10-10 ENCOUNTER — OFFICE VISIT (OUTPATIENT)
Dept: INTERNAL MEDICINE | Age: 42
End: 2017-10-10
Payer: COMMERCIAL

## 2017-10-10 DIAGNOSIS — G47.33 SEVERE OBSTRUCTIVE SLEEP APNEA: ICD-10-CM

## 2017-10-10 DIAGNOSIS — J43.9 MIXED RESTRICTIVE AND OBSTRUCTIVE LUNG DISEASE (HCC): Primary | ICD-10-CM

## 2017-10-10 DIAGNOSIS — R60.9 PERIPHERAL EDEMA: ICD-10-CM

## 2017-10-10 DIAGNOSIS — Z90.81 S/P SPLENECTOMY: ICD-10-CM

## 2017-10-10 DIAGNOSIS — L03.311 ABDOMINAL WALL CELLULITIS: ICD-10-CM

## 2017-10-10 DIAGNOSIS — K21.9 GASTROESOPHAGEAL REFLUX DISEASE WITHOUT ESOPHAGITIS: ICD-10-CM

## 2017-10-10 DIAGNOSIS — I71.21 ASCENDING AORTIC ANEURYSM: ICD-10-CM

## 2017-10-10 DIAGNOSIS — J98.4 MIXED RESTRICTIVE AND OBSTRUCTIVE LUNG DISEASE (HCC): Primary | ICD-10-CM

## 2017-10-10 DIAGNOSIS — I50.810 RIGHT-SIDED HEART FAILURE (HCC): ICD-10-CM

## 2017-10-10 DIAGNOSIS — R07.89 CHEST DISCOMFORT: ICD-10-CM

## 2017-10-10 DIAGNOSIS — I10 ESSENTIAL HYPERTENSION: ICD-10-CM

## 2017-10-10 DIAGNOSIS — E66.01 MORBID OBESITY WITH BMI OF 45.0-49.9, ADULT (HCC): ICD-10-CM

## 2017-10-10 DIAGNOSIS — E11.9 TYPE 2 DIABETES MELLITUS WITHOUT COMPLICATION, WITHOUT LONG-TERM CURRENT USE OF INSULIN (HCC): ICD-10-CM

## 2017-10-10 PROCEDURE — 99214 OFFICE O/P EST MOD 30 MIN: CPT | Performed by: NURSE PRACTITIONER

## 2017-10-12 VITALS
BODY MASS INDEX: 41.75 KG/M2 | SYSTOLIC BLOOD PRESSURE: 136 MMHG | HEIGHT: 73 IN | DIASTOLIC BLOOD PRESSURE: 88 MMHG | RESPIRATION RATE: 18 BRPM | OXYGEN SATURATION: 93 % | WEIGHT: 315 LBS | HEART RATE: 80 BPM | TEMPERATURE: 98.2 F

## 2017-10-12 ASSESSMENT — ENCOUNTER SYMPTOMS
BLOOD IN STOOL: 0
SORE THROAT: 0
CONSTIPATION: 0
NAUSEA: 0
WHEEZING: 0
ORTHOPNEA: 0
ABDOMINAL PAIN: 0
BACK PAIN: 0
VOMITING: 0
HEARTBURN: 0
EYE REDNESS: 0
SPUTUM PRODUCTION: 0
DIARRHEA: 0
COUGH: 0

## 2017-10-12 NOTE — PROGRESS NOTES
Morbid obesity ([facility] [address]     Peripheral edema     Thoracic ascending aortic aneurysm ([facility] [address] 04/23/2015       Past Surgical History:   Procedure Laterality Date    ANTERIOR CRUCIATE LIGAMENT REPAIR Left     ANTERIOR CRUCIATE LIGAMENT REPAIR Left     ARM SURGERY Right     SPLENECTOMY         Current Outpatient Prescriptions on File Prior to Visit   Medication Sig Dispense Refill    furosemide (LASIX) 40 MG tablet Take 0.5 tablets by mouth 2 times daily for 7 days (Patient taking differently: Take 40 mg by mouth daily ) 14 tablet 1    lisinopril (PRINIVIL;ZESTRIL) 5 MG tablet TAKE 1 TABLET BY MOUTH DAILY 90 tablet 3    CPAP Machine MISC by Does not apply route With O2 at 4 LPM. Pressure of 13.  sertraline (ZOLOFT) 25 MG tablet Take 25 mg by mouth daily      mometasone-formoterol (DULERA) 200-5 MCG/ACT inhaler Inhale 2 puffs into the lungs 2 times daily 1 Inhaler 4    simvastatin (ZOCOR) 20 MG tablet Take 1 tablet by mouth nightly 30 tablet 11    metoprolol tartrate (LOPRESSOR) 25 MG tablet TAKE 1 TABLET BY MOUTH TWICE DAILY 180 tablet 3    PROAIR  (90 BASE) MCG/ACT inhaler INHALE 2 PUFFS BY MOUTH INTO LUNGS EVERY 6 HOURS AS NEEDED FOR WHEEZING 8.5 g 11    pantoprazole (PROTONIX) 40 MG tablet TAKE 1 TABLET BY MOUTH DAILY 90 tablet 3    fluticasone (FLONASE) 50 MCG/ACT nasal spray 1 spray by Nasal route daily as needed       metFORMIN (GLUCOPHAGE) 500 MG tablet TAKE 1 TABLET BY MOUTH TWICE DAILY WITH MEALS 180 tablet 3    aspirin EC 81 MG EC tablet Take 1 tablet by mouth daily 30 tablet 3     No current facility-administered medications on file prior to visit. Social History     Social History    Marital status: Single     Spouse name: N/A    Number of children: N/A    Years of education: N/A     Occupational History    Not on file.      Social History Main Topics    Smoking status: Former Smoker     Packs/day: 1.00     Years: 5.00     Types: Cigarettes     Quit date: 1/12/2000    Smokeless tobacco: Never Used      Comment: Quit in 1999. MORA BentonRachel RCP, 3/23/2017.  Alcohol use Yes    Drug use: No    Sexual activity: Not on file     Other Topics Concern    Not on file     Social History Narrative    No narrative on file       Review of Systems   Constitutional: Negative for chills, diaphoresis, fever and malaise/fatigue. HENT: Negative for congestion, hearing loss and sore throat. Eyes: Negative for redness. Respiratory: Negative for cough, sputum production and wheezing. Cardiovascular: Positive for chest pain (Intermittent chest discomfort). Negative for palpitations, orthopnea and leg swelling. Gastrointestinal: Negative for abdominal pain, blood in stool, constipation, diarrhea, heartburn, nausea and vomiting. Genitourinary: Negative for dysuria, flank pain, frequency and urgency. Musculoskeletal: Negative for back pain and myalgias. Skin: Negative for rash. Neurological: Negative for dizziness, tremors, sensory change, seizures, weakness and headaches. Psychiatric/Behavioral: Negative for depression. The patient is not nervous/anxious and does not have insomnia. Physical Exam   Constitutional: He is oriented to person, place, and time and well-developed, well-nourished, and in no distress. No distress. Eyes: Pupils are equal, round, and reactive to light. Right eye exhibits no discharge. Left eye exhibits no discharge. Neck: No JVD present. No tracheal deviation present. Cardiovascular: Normal rate, regular rhythm and normal heart sounds. Exam reveals no gallop and no friction rub. No murmur heard. Pulmonary/Chest: Effort normal and breath sounds normal. No respiratory distress. He has no wheezes. He has no rales. He exhibits no tenderness. Abdominal: Soft. Bowel sounds are normal. He exhibits no distension. There is no tenderness. There is no rebound. Obese   Musculoskeletal: He exhibits no edema (+1 midshin nonpitting edema bilateral).

## 2017-10-19 ENCOUNTER — OFFICE VISIT (OUTPATIENT)
Dept: CARDIOLOGY | Age: 42
End: 2017-10-19
Payer: COMMERCIAL

## 2017-10-19 VITALS
DIASTOLIC BLOOD PRESSURE: 75 MMHG | SYSTOLIC BLOOD PRESSURE: 139 MMHG | HEIGHT: 73 IN | WEIGHT: 315 LBS | HEART RATE: 114 BPM | BODY MASS INDEX: 41.75 KG/M2

## 2017-10-19 DIAGNOSIS — I27.81 COR PULMONALE (HCC): Primary | ICD-10-CM

## 2017-10-19 PROCEDURE — 99204 OFFICE O/P NEW MOD 45 MIN: CPT | Performed by: INTERNAL MEDICINE

## 2017-10-19 NOTE — PROGRESS NOTES
Today's Date: 10/19/2017  Patient Name: Shanel Bolanos  Patient's age: 38 y.o., 1975          The patient is a 38 y.o.  male is in the office for evaluation of right heart failure. He has chronic respiratory failure/COPD, AND COLLIN. DOES NOT WEAR CPAP MASK EVERY NIGHT. DENIES ANGINA. Past Medical History:   has a past medical history of Anxiety; Arthritis; Asthma; COPD (chronic obstructive pulmonary disease) ([facility] Diabetes mellitus ([facility] GERD (gastroesophageal reflux disease); Hypertension; Morbid obesity ([facility] Peripheral edema; and Thoracic ascending aortic aneurysm ([facility] Past Surgical History:   has a past surgical history that includes Splenectomy; Anterior cruciate ligament repair (Left); Arm Surgery (Right); and Anterior cruciate ligament repair (Left). Home Medications:    Prior to Admission medications    Medication Sig Start Date End Date Taking? Authorizing Provider   lisinopril (PRINIVIL;ZESTRIL) 5 MG tablet TAKE 1 TABLET BY MOUTH DAILY 8/7/17  Yes Joe Gore MD   CPAP Machine MISC by Does not apply route With O2 at 4 LPM. Pressure of 13.    Yes Historical Provider, MD   sertraline (ZOLOFT) 25 MG tablet Take 25 mg by mouth daily   Yes Historical Provider, MD   mometasone-formoterol (DULERA) 200-5 MCG/ACT inhaler Inhale 2 puffs into the lungs 2 times daily 5/31/17  Yes Asaf Diane MD   simvastatin (ZOCOR) 20 MG tablet Take 1 tablet by mouth nightly 5/18/17  Yes Joe Gore MD   metoprolol tartrate (LOPRESSOR) 25 MG tablet TAKE 1 TABLET BY MOUTH TWICE DAILY 11/30/16  Yes Joe Gore MD   PROAIR  (90 BASE) MCG/ACT inhaler INHALE 2 PUFFS BY MOUTH INTO LUNGS EVERY 6 HOURS AS NEEDED FOR WHEEZING 8/31/16  Yes Joe Gore MD   pantoprazole (PROTONIX) 40 MG tablet TAKE 1 TABLET BY MOUTH DAILY 8/31/16  Yes Joe Gore MD   fluticasone (FLONASE) 50 MCG/ACT nasal spray 1 spray by Nasal route daily as needed  7/15/15  Yes Historical MD Deepti   metFORMIN (GLUCOPHAGE) 500 MG tablet TAKE 1 TABLET BY MOUTH TWICE DAILY WITH MEALS 7/21/15  Yes Lewis Power MD   aspirin EC 81 MG EC tablet Take 1 tablet by mouth daily 4/25/15  Yes Helena Crabtree MD   furosemide (LASIX) 40 MG tablet Take 0.5 tablets by mouth 2 times daily for 7 days  Patient taking differently: Take 40 mg by mouth daily  10/7/17 10/14/17  Kehinde Booth MD       Allergies:  Bee venom and Other    Social History:   reports that he quit smoking about 17 years ago. His smoking use included Cigarettes. He has a 5.00 pack-year smoking history. He has never used smokeless tobacco. He reports that he drinks alcohol. He reports that he does not use drugs. REVIEW OF SYSTEMS:  CONSTITUTIONAL:NEGATIVE  HEENT:NEG  Cardiovascular: No chest pain, Yes dyspnea on exertion, No palpitations. Lower extremity edema: Yes  RESPIRATORY: CARD  GASTROINTESTINAL:  positive for reflux  GENITOURINARY:  negative  INTEGUMENT:  negative  MUSCULOSKELETAL:  positive for  pain  NEUROLOGICAL:  negative    PHYSICAL EXAM:      /75 (Cuff Size: Large Adult)   Pulse 114   Ht 6' 1\" (1.854 m)   Wt (!) 349 lb (158.3 kg)   BMI 46.04 kg/m²    HEENT: PERRL, no cervical lymphadenopathy. No masses palpable. Cardiovascular:  · The apical impulse is not displaced  · Heart  Sounds:DISTANT S1/S2, HSL LSB  · Jugular venous pulsation Abnormal  ELEVATED  · The carotid upstroke is NL  · Peripheral pulses are symmetrical and full  Respiratory: Good respiratory effort. On auscultation: clear to auscultation bilaterally and rhonchi bilaterally  Abdomen:  · No masses or tenderness  · Bowel sounds present  Extremities:  ·  No Cyanosis or Clubbing  ·  Lower extremity edema: YES  Skin: Warm and dry    Cardiac data:    ECG 10/2/2017:  NSR, SEPTAL Q WAVES  ECHO 10/2/2017:  Summary  Technically difficult study- patient was supine and on a ventilator.   Left ventricular enlargement with low normal systolic function; Estimated  left ventricular ejection fraction 50% range. Mild Left ventricular hypertrophy. Right atrial dilatation. Right ventricular dilatation with borderline reduced systolic function. Aortic valve appears bicuspid with a raphe and fusion of RCC and NCC. No significant stenosis or regurgitation is seen. Mild tricuspid regurgitation. Severely dilated ascending aorta that measures greatest at 5.1 cm. Labs:     CBC: No results for input(s): WBC, HGB, HCT, PLT in the last 72 hours. BMP: No results for input(s): NA, K, CO2, BUN, CREATININE, LABGLOM, GLUCOSE in the last 72 hours. PT/INR: No results for input(s): PROTIME, INR in the last 72 hours. FASTING LIPID PANEL:  Lab Results   Component Value Date    HDL 40 03/24/2017    TRIG 115 10/04/2017     LIVER PROFILE:No results for input(s): AST, ALT, LABALBU in the last 72 hours. IMPRESSION:    COR PULMONALE DUE TO LUNG DISEASE AND COLLIN  ASCENDING AORTIC ANEURYSM, MONITORED BY CTS, Dr Eliceo Alfredo. Bia  MORBID OBESITY AND COLLIN  NONCOMPLIANCE WITH CPAP  HTN  HLP  Patient Active Problem List   Diagnosis    Ascending aortic aneurysm ([address]    Foreign body (FB) in soft tissue    Hypertension    Oropharyngeal candidiasis    Asthma    GERD (gastroesophageal reflux disease)    Anxiety    S/P splenectomy    Acute exacerbation of chronic obstructive pulmonary disease (COPD) (HCC)    Diabetes mellitus (HCC)    Peripheral edema    Severe obstructive sleep apnea AHI-91.6 - CPAP 13 CWP (C-FLEX-3) 4 L. MIN O2 BLENDED     Acute on chronic respiratory failure with hypoxia and hypercapnia (HCC)    Acute on chronic right heart failure    Mixed restrictive and obstructive lung disease ([address]    Morbid obesity with BMI of 45.0-49.9, adult (HCC)    Abdominal wall cellulitis    Asthma with acute exacerbation       RECOMMENDATIONS:  SUPPORT STOCKINGS   CONTINUE LASIX AT CURRENT   D/W PT CPAP USE  CONTINUE ASA/ACEI/BB/STATIN  WILL PROBABLY NEED CARDIAC CATH PRIOR TO ANEURYSM [address] [facility] [address] Cardiology Consult           [phone]

## 2017-10-23 ENCOUNTER — TELEPHONE (OUTPATIENT)
Dept: INTERNAL MEDICINE | Age: 42
End: 2017-10-23

## 2017-10-23 NOTE — TELEPHONE ENCOUNTER
Was sent to cardiology for evaluation- Did they have any restrictions- Currently is to be on lifting restrictions per [facility] notes due to aneurysm.

## 2017-10-23 NOTE — TELEPHONE ENCOUNTER
Patient was seen for transitional visit last week by Nahomi Hayden. He wants to return to work and  Needs to know if you can ok him for that? He isn't sure who took him off work. Possibly cardiologist?  Patient just wants to know what direction he needs to take on this.   [phone]

## 2017-10-24 NOTE — TELEPHONE ENCOUNTER
Please make sure patient aware of these restrictions. We'll also await cardiology's input. Noted he denied any chest discomfort to cardiology per their note.

## 2017-10-24 NOTE — TELEPHONE ENCOUNTER
Faxed note to MA in [facility] [place], [facility]. today for review.

## 2017-10-24 NOTE — TELEPHONE ENCOUNTER
Patient notified. He states he will also contact Dr. Nelia Deluna office for recommendations. Patient states he has been feeling fine, no concerns currently with sob or chest pain.

## 2017-10-25 NOTE — TELEPHONE ENCOUNTER
Patient states he contacted the surgeon's office and they advised it is up to primary care to release him back to work.

## 2017-10-27 ENCOUNTER — HOSPITAL ENCOUNTER (OUTPATIENT)
Dept: LAB | Age: 42
Setting detail: SPECIMEN
Discharge: HOME | End: 2017-10-27
Payer: COMMERCIAL

## 2017-10-27 ENCOUNTER — TELEPHONE (OUTPATIENT)
Dept: INTERNAL MEDICINE | Age: 42
End: 2017-10-27

## 2017-10-27 DIAGNOSIS — Z11.4 SCREENING FOR HIV (HUMAN IMMUNODEFICIENCY VIRUS): ICD-10-CM

## 2017-10-27 DIAGNOSIS — E11.9 TYPE 2 DIABETES MELLITUS WITHOUT COMPLICATION, WITHOUT LONG-TERM CURRENT USE OF INSULIN (HCC): ICD-10-CM

## 2017-10-27 DIAGNOSIS — I10 ESSENTIAL HYPERTENSION: ICD-10-CM

## 2017-10-27 LAB
ANION GAP SERPL CALCULATED.3IONS-SCNC: 13 MMOL/L (ref 9–17)
BUN BLDV-MCNC: 16 MG/DL (ref 6–20)
BUN/CREAT BLD: 23 (ref 9–20)
CALCIUM SERPL-MCNC: 9.5 MG/DL (ref 8.6–10.4)
CHLORIDE BLD-SCNC: 99 MMOL/L (ref 98–107)
CO2: 30 MMOL/L (ref 20–31)
CREAT SERPL-MCNC: 0.69 MG/DL (ref 0.7–1.2)
CREATININE URINE: 141.1 MG/DL (ref 39–259)
ESTIMATED AVERAGE GLUCOSE: 169 MG/DL
GFR AFRICAN AMERICAN: >60 ML/MIN
GFR NON-AFRICAN AMERICAN: >60 ML/MIN
GFR SERPL CREATININE-BSD FRML MDRD: ABNORMAL ML/MIN/{1.73_M2}
GFR SERPL CREATININE-BSD FRML MDRD: ABNORMAL ML/MIN/{1.73_M2}
GLUCOSE BLD-MCNC: 131 MG/DL (ref 70–99)
HBA1C MFR BLD: 7.5 % (ref 4.8–5.9)
HIV AG/AB: NONREACTIVE
MICROALBUMIN/CREAT 24H UR: <12 MG/L
MICROALBUMIN/CREAT UR-RTO: NORMAL MCG/MG CREAT
POTASSIUM SERPL-SCNC: 4.5 MMOL/L (ref 3.7–5.3)
SODIUM BLD-SCNC: 142 MMOL/L (ref 135–144)

## 2017-10-27 PROCEDURE — 82043 UR ALBUMIN QUANTITATIVE: CPT

## 2017-10-27 PROCEDURE — 82570 ASSAY OF URINE CREATININE: CPT

## 2017-10-27 PROCEDURE — 80048 BASIC METABOLIC PNL TOTAL CA: CPT

## 2017-10-27 PROCEDURE — 83036 HEMOGLOBIN GLYCOSYLATED A1C: CPT

## 2017-10-27 PROCEDURE — 87389 HIV-1 AG W/HIV-1&-2 AB AG IA: CPT

## 2017-10-27 PROCEDURE — 36415 COLL VENOUS BLD VENIPUNCTURE: CPT

## 2017-11-07 ENCOUNTER — TELEPHONE (OUTPATIENT)
Dept: INTERNAL MEDICINE | Age: 42
End: 2017-11-07
Payer: COMMERCIAL

## 2017-11-07 DIAGNOSIS — I50.813 ACUTE ON CHRONIC RIGHT HEART FAILURE (HCC): ICD-10-CM

## 2017-11-07 DIAGNOSIS — J96.22 ACUTE ON CHRONIC RESPIRATORY FAILURE WITH HYPOXIA AND HYPERCAPNIA (HCC): Primary | ICD-10-CM

## 2017-11-07 DIAGNOSIS — J45.909 ASTHMA, UNSPECIFIED ASTHMA SEVERITY, UNSPECIFIED WHETHER COMPLICATED, UNSPECIFIED WHETHER PERSISTENT: ICD-10-CM

## 2017-11-07 DIAGNOSIS — G47.33 SEVERE OBSTRUCTIVE SLEEP APNEA: ICD-10-CM

## 2017-11-07 DIAGNOSIS — I10 ESSENTIAL HYPERTENSION: ICD-10-CM

## 2017-11-07 DIAGNOSIS — J44.1 ACUTE EXACERBATION OF CHRONIC OBSTRUCTIVE PULMONARY DISEASE (COPD) (HCC): ICD-10-CM

## 2017-11-07 DIAGNOSIS — J96.21 ACUTE ON CHRONIC RESPIRATORY FAILURE WITH HYPOXIA AND HYPERCAPNIA (HCC): Primary | ICD-10-CM

## 2017-11-07 DIAGNOSIS — E11.9 TYPE 2 DIABETES MELLITUS WITHOUT COMPLICATION, WITHOUT LONG-TERM CURRENT USE OF INSULIN (HCC): ICD-10-CM

## 2017-11-07 PROCEDURE — G0180 MD CERTIFICATION HHA PATIENT: HCPCS | Performed by: INTERNAL MEDICINE

## 2017-11-07 RX ORDER — FUROSEMIDE 40 MG/1
40 TABLET ORAL DAILY
COMMUNITY
End: 2018-03-02 | Stop reason: SDUPTHER

## 2017-11-19 DIAGNOSIS — I10 HYPERTENSION: ICD-10-CM

## 2017-11-19 DIAGNOSIS — K21.9 GERD (GASTROESOPHAGEAL REFLUX DISEASE): ICD-10-CM

## 2017-11-20 RX ORDER — PANTOPRAZOLE SODIUM 40 MG/1
TABLET, DELAYED RELEASE ORAL
Qty: 90 TABLET | Refills: 3 | Status: SHIPPED | OUTPATIENT
Start: 2017-11-20 | End: 2019-01-01 | Stop reason: SDUPTHER

## 2017-12-28 ENCOUNTER — HOSPITAL ENCOUNTER (OUTPATIENT)
Dept: CT IMAGING | Age: 42
Discharge: HOME | End: 2017-12-28
Payer: COMMERCIAL

## 2017-12-28 DIAGNOSIS — I71.21 ASCENDING AORTIC ANEURYSM: ICD-10-CM

## 2017-12-28 PROCEDURE — 71250 CT THORAX DX C-: CPT

## 2018-01-01 ENCOUNTER — TELEPHONE (OUTPATIENT)
Dept: CARDIOLOGY | Age: 43
End: 2018-01-01

## 2018-01-01 ENCOUNTER — TELEPHONE (OUTPATIENT)
Dept: INTERNAL MEDICINE | Age: 43
End: 2018-01-01

## 2018-01-01 ENCOUNTER — HOSPITAL ENCOUNTER (OUTPATIENT)
Dept: GENERAL RADIOLOGY | Age: 43
Discharge: HOME | End: 2018-11-01
Payer: COMMERCIAL

## 2018-01-01 ENCOUNTER — HOSPITAL ENCOUNTER (OUTPATIENT)
Dept: CT IMAGING | Age: 43
Discharge: HOME | End: 2018-06-09
Payer: COMMERCIAL

## 2018-01-01 ENCOUNTER — OFFICE VISIT (OUTPATIENT)
Dept: CARDIOLOGY | Age: 43
End: 2018-01-01
Payer: COMMERCIAL

## 2018-01-01 ENCOUNTER — OFFICE VISIT (OUTPATIENT)
Dept: ORTHOPEDIC SURGERY | Age: 43
End: 2018-01-01
Payer: COMMERCIAL

## 2018-01-01 ENCOUNTER — OFFICE VISIT (OUTPATIENT)
Dept: INTERNAL MEDICINE | Age: 43
End: 2018-01-01
Payer: COMMERCIAL

## 2018-01-01 ENCOUNTER — HOSPITAL ENCOUNTER (EMERGENCY)
Age: 43
Discharge: HOME | End: 2018-06-13
Attending: EMERGENCY MEDICINE
Payer: COMMERCIAL

## 2018-01-01 ENCOUNTER — APPOINTMENT (OUTPATIENT)
Dept: GENERAL RADIOLOGY | Age: 43
DRG: 291 | End: 2018-01-01
Payer: COMMERCIAL

## 2018-01-01 ENCOUNTER — OFFICE VISIT (OUTPATIENT)
Dept: PULMONOLOGY | Age: 43
End: 2018-01-01
Payer: COMMERCIAL

## 2018-01-01 ENCOUNTER — APPOINTMENT (OUTPATIENT)
Dept: GENERAL RADIOLOGY | Age: 43
End: 2018-01-01
Payer: COMMERCIAL

## 2018-01-01 ENCOUNTER — HOSPITAL ENCOUNTER (INPATIENT)
Age: 43
LOS: 4 days | Discharge: HOME | DRG: 291 | End: 2018-04-28
Attending: EMERGENCY MEDICINE | Admitting: FAMILY MEDICINE
Payer: COMMERCIAL

## 2018-01-01 ENCOUNTER — HOSPITAL ENCOUNTER (OUTPATIENT)
Dept: LAB | Age: 43
Setting detail: SPECIMEN
Discharge: HOME | End: 2018-09-06
Payer: COMMERCIAL

## 2018-01-01 ENCOUNTER — HOSPITAL ENCOUNTER (OUTPATIENT)
Dept: NON INVASIVE DIAGNOSTICS | Age: 43
Discharge: HOME | End: 2018-12-05
Payer: COMMERCIAL

## 2018-01-01 VITALS
HEIGHT: 72 IN | TEMPERATURE: 98.3 F | RESPIRATION RATE: 20 BRPM | WEIGHT: 315 LBS | SYSTOLIC BLOOD PRESSURE: 132 MMHG | OXYGEN SATURATION: 93 % | BODY MASS INDEX: 42.66 KG/M2 | HEART RATE: 100 BPM | DIASTOLIC BLOOD PRESSURE: 69 MMHG

## 2018-01-01 VITALS
BODY MASS INDEX: 42.66 KG/M2 | HEART RATE: 93 BPM | SYSTOLIC BLOOD PRESSURE: 148 MMHG | WEIGHT: 315 LBS | HEIGHT: 72 IN | DIASTOLIC BLOOD PRESSURE: 80 MMHG

## 2018-01-01 VITALS
WEIGHT: 315 LBS | TEMPERATURE: 99.3 F | DIASTOLIC BLOOD PRESSURE: 86 MMHG | HEART RATE: 90 BPM | BODY MASS INDEX: 46.92 KG/M2 | SYSTOLIC BLOOD PRESSURE: 118 MMHG | OXYGEN SATURATION: 96 %

## 2018-01-01 VITALS
OXYGEN SATURATION: 94 % | SYSTOLIC BLOOD PRESSURE: 136 MMHG | HEART RATE: 104 BPM | HEIGHT: 72 IN | BODY MASS INDEX: 42.66 KG/M2 | WEIGHT: 315 LBS | DIASTOLIC BLOOD PRESSURE: 88 MMHG

## 2018-01-01 VITALS
HEIGHT: 72 IN | DIASTOLIC BLOOD PRESSURE: 86 MMHG | WEIGHT: 315 LBS | SYSTOLIC BLOOD PRESSURE: 142 MMHG | HEART RATE: 92 BPM | RESPIRATION RATE: 20 BRPM | BODY MASS INDEX: 42.66 KG/M2

## 2018-01-01 VITALS
BODY MASS INDEX: 41.75 KG/M2 | WEIGHT: 315 LBS | DIASTOLIC BLOOD PRESSURE: 80 MMHG | HEART RATE: 106 BPM | OXYGEN SATURATION: 84 % | HEIGHT: 73 IN | SYSTOLIC BLOOD PRESSURE: 128 MMHG

## 2018-01-01 VITALS
OXYGEN SATURATION: 90 % | BODY MASS INDEX: 42.66 KG/M2 | HEART RATE: 96 BPM | DIASTOLIC BLOOD PRESSURE: 86 MMHG | HEIGHT: 72 IN | SYSTOLIC BLOOD PRESSURE: 120 MMHG | WEIGHT: 315 LBS | RESPIRATION RATE: 20 BRPM

## 2018-01-01 VITALS
WEIGHT: 315 LBS | BODY MASS INDEX: 42.66 KG/M2 | HEART RATE: 99 BPM | OXYGEN SATURATION: 90 % | HEIGHT: 72 IN | SYSTOLIC BLOOD PRESSURE: 134 MMHG | RESPIRATION RATE: 18 BRPM | DIASTOLIC BLOOD PRESSURE: 80 MMHG

## 2018-01-01 VITALS
OXYGEN SATURATION: 91 % | SYSTOLIC BLOOD PRESSURE: 132 MMHG | WEIGHT: 315 LBS | DIASTOLIC BLOOD PRESSURE: 80 MMHG | HEIGHT: 72 IN | HEART RATE: 112 BPM | BODY MASS INDEX: 42.66 KG/M2 | RESPIRATION RATE: 20 BRPM

## 2018-01-01 VITALS
OXYGEN SATURATION: 94 % | HEIGHT: 72 IN | SYSTOLIC BLOOD PRESSURE: 125 MMHG | BODY MASS INDEX: 42.66 KG/M2 | TEMPERATURE: 98.1 F | RESPIRATION RATE: 18 BRPM | HEART RATE: 78 BPM | WEIGHT: 315 LBS | DIASTOLIC BLOOD PRESSURE: 67 MMHG

## 2018-01-01 VITALS
SYSTOLIC BLOOD PRESSURE: 130 MMHG | HEIGHT: 72 IN | WEIGHT: 315 LBS | DIASTOLIC BLOOD PRESSURE: 80 MMHG | BODY MASS INDEX: 42.66 KG/M2 | HEART RATE: 98 BPM

## 2018-01-01 DIAGNOSIS — M25.561 PAIN IN BOTH KNEES, UNSPECIFIED CHRONICITY: ICD-10-CM

## 2018-01-01 DIAGNOSIS — I10 ESSENTIAL HYPERTENSION: ICD-10-CM

## 2018-01-01 DIAGNOSIS — Z13.220 SCREENING FOR LIPOID DISORDERS: ICD-10-CM

## 2018-01-01 DIAGNOSIS — J98.4 RESTRICTIVE LUNG DISEASE SECONDARY TO OBESITY: ICD-10-CM

## 2018-01-01 DIAGNOSIS — M25.562 PAIN IN BOTH KNEES, UNSPECIFIED CHRONICITY: ICD-10-CM

## 2018-01-01 DIAGNOSIS — E66.01 MORBID OBESITY DUE TO EXCESS CALORIES (HCC): ICD-10-CM

## 2018-01-01 DIAGNOSIS — I71.20 THORACIC AORTIC ANEURYSM WITHOUT RUPTURE: ICD-10-CM

## 2018-01-01 DIAGNOSIS — M17.0 BILATERAL PRIMARY OSTEOARTHRITIS OF KNEE: Primary | ICD-10-CM

## 2018-01-01 DIAGNOSIS — E11.9 TYPE 2 DIABETES MELLITUS WITHOUT COMPLICATION, WITHOUT LONG-TERM CURRENT USE OF INSULIN (HCC): ICD-10-CM

## 2018-01-01 DIAGNOSIS — I26.09 COR PULMONALE, ACUTE (HCC): Primary | ICD-10-CM

## 2018-01-01 DIAGNOSIS — R07.9 CHEST PAIN, UNSPECIFIED TYPE: Primary | ICD-10-CM

## 2018-01-01 DIAGNOSIS — Q23.1 BICUSPID AORTIC VALVE: ICD-10-CM

## 2018-01-01 DIAGNOSIS — I71.21 ASCENDING AORTIC ANEURYSM: ICD-10-CM

## 2018-01-01 DIAGNOSIS — Z00.00 GENERAL MEDICAL EXAM: Primary | ICD-10-CM

## 2018-01-01 DIAGNOSIS — I27.81 COR PULMONALE, CHRONIC (HCC): Primary | ICD-10-CM

## 2018-01-01 DIAGNOSIS — I10 ESSENTIAL HYPERTENSION: Primary | ICD-10-CM

## 2018-01-01 DIAGNOSIS — J44.9 CHRONIC OBSTRUCTIVE PULMONARY DISEASE, UNSPECIFIED COPD TYPE (HCC): ICD-10-CM

## 2018-01-01 DIAGNOSIS — M25.561 RIGHT KNEE PAIN, UNSPECIFIED CHRONICITY: ICD-10-CM

## 2018-01-01 DIAGNOSIS — R73.01 IFG (IMPAIRED FASTING GLUCOSE): ICD-10-CM

## 2018-01-01 DIAGNOSIS — G47.33 SEVERE OBSTRUCTIVE SLEEP APNEA: ICD-10-CM

## 2018-01-01 DIAGNOSIS — I50.9 CONGESTIVE HEART FAILURE, UNSPECIFIED CONGESTIVE HEART FAILURE CHRONICITY, UNSPECIFIED CONGESTIVE HEART FAILURE TYPE: ICD-10-CM

## 2018-01-01 DIAGNOSIS — M25.562 PAIN IN BOTH KNEES, UNSPECIFIED CHRONICITY: Primary | ICD-10-CM

## 2018-01-01 DIAGNOSIS — M79.89 LEG SWELLING: Primary | ICD-10-CM

## 2018-01-01 DIAGNOSIS — J44.9 COPD, SEVERE (HCC): Primary | ICD-10-CM

## 2018-01-01 DIAGNOSIS — R91.8 MULTIPLE LUNG NODULES ON CT: ICD-10-CM

## 2018-01-01 DIAGNOSIS — I50.9 ACUTE ON CHRONIC CONGESTIVE HEART FAILURE, UNSPECIFIED CONGESTIVE HEART FAILURE TYPE: ICD-10-CM

## 2018-01-01 DIAGNOSIS — M25.561 PAIN IN BOTH KNEES, UNSPECIFIED CHRONICITY: Primary | ICD-10-CM

## 2018-01-01 DIAGNOSIS — J44.9 CHRONIC OBSTRUCTIVE PULMONARY DISEASE, UNSPECIFIED COPD TYPE (HCC): Primary | ICD-10-CM

## 2018-01-01 DIAGNOSIS — F32.A DEPRESSION, UNSPECIFIED DEPRESSION TYPE: ICD-10-CM

## 2018-01-01 DIAGNOSIS — E66.9 RESTRICTIVE LUNG DISEASE SECONDARY TO OBESITY: ICD-10-CM

## 2018-01-01 LAB
ABSOLUTE EOS #: 0.09 K/UL (ref 0–0.4)
ABSOLUTE EOS #: 0.1 K/UL (ref 0–0.4)
ABSOLUTE EOS #: 0.1 K/UL (ref 0–0.4)
ABSOLUTE EOS #: 0.12 K/UL (ref 0–0.4)
ABSOLUTE EOS #: 0.2 K/UL (ref 0–0.4)
ABSOLUTE EOS #: 0.2 K/UL (ref 0–0.4)
ABSOLUTE IMMATURE GRANULOCYTE: ABNORMAL K/UL (ref 0–0.3)
ABSOLUTE LYMPH #: 1.7 K/UL (ref 1–4.8)
ABSOLUTE LYMPH #: 2 K/UL (ref 1–4.8)
ABSOLUTE LYMPH #: 2.1 K/UL (ref 1–4.8)
ABSOLUTE LYMPH #: 2.14 K/UL (ref 1–4.8)
ABSOLUTE LYMPH #: 2.2 K/UL (ref 1–4.8)
ABSOLUTE LYMPH #: 2.44 K/UL (ref 1–4.8)
ABSOLUTE MONO #: 1 K/UL (ref 0.1–1.2)
ABSOLUTE MONO #: 1.16 K/UL (ref 0.1–1.2)
ABSOLUTE MONO #: 1.2 K/UL (ref 0.1–1.2)
ABSOLUTE MONO #: 1.2 K/UL (ref 0.1–1.2)
ABSOLUTE MONO #: 1.34 K/UL (ref 0.1–1.2)
ABSOLUTE MONO #: 1.5 K/UL (ref 0.1–1.2)
ALBUMIN SERPL-MCNC: 4.3 G/DL (ref 3.5–5.2)
ALBUMIN/GLOBULIN RATIO: 1.3 (ref 1–2.5)
ALP BLD-CCNC: 68 U/L (ref 40–129)
ALT SERPL-CCNC: 18 U/L (ref 5–41)
ANION GAP SERPL CALCULATED.3IONS-SCNC: 10 MMOL/L (ref 9–17)
ANION GAP SERPL CALCULATED.3IONS-SCNC: 14 MMOL/L (ref 9–17)
ANION GAP SERPL CALCULATED.3IONS-SCNC: 3 MMOL/L (ref 9–17)
ANION GAP SERPL CALCULATED.3IONS-SCNC: 6 MMOL/L (ref 9–17)
ANION GAP SERPL CALCULATED.3IONS-SCNC: 6 MMOL/L (ref 9–17)
ANION GAP SERPL CALCULATED.3IONS-SCNC: 7 MMOL/L (ref 9–17)
ANION GAP SERPL CALCULATED.3IONS-SCNC: 8 MMOL/L (ref 9–17)
AST SERPL-CCNC: 16 U/L
BASOPHILS # BLD: 0 % (ref 0–2)
BASOPHILS # BLD: 1 % (ref 0–2)
BASOPHILS ABSOLUTE: 0 K/UL (ref 0–0.2)
BASOPHILS ABSOLUTE: 0.09 K/UL (ref 0–0.2)
BASOPHILS ABSOLUTE: 0.1 K/UL (ref 0–0.2)
BASOPHILS ABSOLUTE: 0.12 K/UL (ref 0–0.2)
BILIRUB SERPL-MCNC: 0.36 MG/DL (ref 0.3–1.2)
BNP INTERPRETATION: ABNORMAL
BUN BLDV-MCNC: 11 MG/DL (ref 6–20)
BUN BLDV-MCNC: 14 MG/DL (ref 6–20)
BUN BLDV-MCNC: 15 MG/DL (ref 6–20)
BUN BLDV-MCNC: 15 MG/DL (ref 6–20)
BUN BLDV-MCNC: 17 MG/DL (ref 6–20)
BUN BLDV-MCNC: 20 MG/DL (ref 6–20)
BUN BLDV-MCNC: 20 MG/DL (ref 6–20)
BUN/CREAT BLD: 14 (ref 9–20)
BUN/CREAT BLD: 16 (ref 9–20)
BUN/CREAT BLD: 16 (ref 9–20)
BUN/CREAT BLD: 17 (ref 9–20)
BUN/CREAT BLD: 19 (ref 9–20)
BUN/CREAT BLD: 23 (ref 9–20)
BUN/CREAT BLD: 26 (ref 9–20)
CALCIUM SERPL-MCNC: 10 MG/DL (ref 8.6–10.4)
CALCIUM SERPL-MCNC: 8.9 MG/DL (ref 8.6–10.4)
CALCIUM SERPL-MCNC: 9 MG/DL (ref 8.6–10.4)
CALCIUM SERPL-MCNC: 9.1 MG/DL (ref 8.6–10.4)
CALCIUM SERPL-MCNC: 9.3 MG/DL (ref 8.6–10.4)
CHLORIDE BLD-SCNC: 100 MMOL/L (ref 98–107)
CHLORIDE BLD-SCNC: 102 MMOL/L (ref 98–107)
CHLORIDE BLD-SCNC: 99 MMOL/L (ref 98–107)
CHOLESTEROL/HDL RATIO: 4.2
CHOLESTEROL: 190 MG/DL
CO2: 29 MMOL/L (ref 20–31)
CO2: 32 MMOL/L (ref 20–31)
CO2: 38 MMOL/L (ref 20–31)
CO2: 40 MMOL/L (ref 20–31)
CO2: 41 MMOL/L (ref 20–31)
CO2: 42 MMOL/L (ref 20–31)
CO2: 45 MMOL/L (ref 20–31)
CREAT SERPL-MCNC: 0.76 MG/DL (ref 0.7–1.2)
CREAT SERPL-MCNC: 0.77 MG/DL (ref 0.7–1.2)
CREAT SERPL-MCNC: 0.81 MG/DL (ref 0.7–1.2)
CREAT SERPL-MCNC: 0.84 MG/DL (ref 0.7–1.2)
CREAT SERPL-MCNC: 0.88 MG/DL (ref 0.7–1.2)
CREAT SERPL-MCNC: 0.91 MG/DL (ref 0.7–1.2)
CREAT SERPL-MCNC: 1.05 MG/DL (ref 0.7–1.2)
DIFFERENTIAL TYPE: ABNORMAL
EKG ATRIAL RATE: 96 BPM
EKG ATRIAL RATE: 98 BPM
EKG P AXIS: 43 DEGREES
EKG P AXIS: 44 DEGREES
EKG P-R INTERVAL: 162 MS
EKG P-R INTERVAL: 178 MS
EKG Q-T INTERVAL: 356 MS
EKG Q-T INTERVAL: 358 MS
EKG QRS DURATION: 80 MS
EKG QRS DURATION: 90 MS
EKG QTC CALCULATION (BAZETT): 452 MS
EKG QTC CALCULATION (BAZETT): 454 MS
EKG R AXIS: 30 DEGREES
EKG R AXIS: 36 DEGREES
EKG T AXIS: 22 DEGREES
EKG T AXIS: 9 DEGREES
EKG VENTRICULAR RATE: 96 BPM
EKG VENTRICULAR RATE: 98 BPM
EOSINOPHILS RELATIVE PERCENT: 1 % (ref 1–8)
EOSINOPHILS RELATIVE PERCENT: 1 % (ref 1–8)
EOSINOPHILS RELATIVE PERCENT: 2 % (ref 1–8)
ESTIMATED AVERAGE GLUCOSE: 126 MG/DL
GFR AFRICAN AMERICAN: >60 ML/MIN
GFR NON-AFRICAN AMERICAN: >60 ML/MIN
GFR SERPL CREATININE-BSD FRML MDRD: >60 ML/MIN
GFR SERPL CREATININE-BSD FRML MDRD: ABNORMAL ML/MIN/{1.73_M2}
GFR SERPL CREATININE-BSD FRML MDRD: NORMAL ML/MIN/{1.73_M2}
GLUCOSE BLD-MCNC: 100 MG/DL (ref 70–99)
GLUCOSE BLD-MCNC: 101 MG/DL (ref 70–99)
GLUCOSE BLD-MCNC: 107 MG/DL (ref 75–110)
GLUCOSE BLD-MCNC: 108 MG/DL (ref 75–110)
GLUCOSE BLD-MCNC: 110 MG/DL (ref 70–99)
GLUCOSE BLD-MCNC: 115 MG/DL (ref 75–110)
GLUCOSE BLD-MCNC: 117 MG/DL (ref 75–110)
GLUCOSE BLD-MCNC: 119 MG/DL (ref 70–99)
GLUCOSE BLD-MCNC: 122 MG/DL (ref 70–99)
GLUCOSE BLD-MCNC: 126 MG/DL (ref 75–110)
GLUCOSE BLD-MCNC: 127 MG/DL (ref 75–110)
GLUCOSE BLD-MCNC: 134 MG/DL (ref 70–99)
GLUCOSE BLD-MCNC: 148 MG/DL (ref 75–110)
GLUCOSE BLD-MCNC: 161 MG/DL (ref 75–110)
GLUCOSE BLD-MCNC: 195 MG/DL (ref 75–110)
GLUCOSE BLD-MCNC: 92 MG/DL (ref 70–99)
GLUCOSE BLD-MCNC: 97 MG/DL (ref 75–110)
GLUCOSE BLD-MCNC: 99 MG/DL (ref 75–110)
HBA1C MFR BLD: 6 % (ref 4.8–5.9)
HCT VFR BLD CALC: 48.6 % (ref 41–53)
HCT VFR BLD CALC: 48.6 % (ref 41–53)
HCT VFR BLD CALC: 48.8 % (ref 41–53)
HCT VFR BLD CALC: 49 % (ref 41–53)
HCT VFR BLD CALC: 49.4 % (ref 41–53)
HCT VFR BLD CALC: 51.1 % (ref 41–53)
HDLC SERPL-MCNC: 45 MG/DL
HEMOGLOBIN: 14.4 G/DL (ref 13.5–17.5)
HEMOGLOBIN: 14.5 G/DL (ref 13.5–17.5)
HEMOGLOBIN: 14.6 G/DL (ref 13.5–17.5)
HEMOGLOBIN: 14.6 G/DL (ref 13.5–17.5)
HEMOGLOBIN: 15.3 G/DL (ref 13.5–17.5)
HEMOGLOBIN: 15.5 G/DL (ref 13.5–17.5)
IMMATURE GRANULOCYTES: ABNORMAL %
INR BLD: 1.2
LDL CHOLESTEROL: 113 MG/DL (ref 0–130)
LV EF: 50 %
LVEF MODALITY: NORMAL
LYMPHOCYTES # BLD: 20 % (ref 15–43)
LYMPHOCYTES # BLD: 23 % (ref 15–43)
LYMPHOCYTES # BLD: 24 % (ref 15–43)
LYMPHOCYTES # BLD: 25 % (ref 15–43)
LYMPHOCYTES # BLD: 26 % (ref 15–43)
LYMPHOCYTES # BLD: 28 % (ref 15–43)
MCH RBC QN AUTO: 26.1 PG (ref 26–34)
MCH RBC QN AUTO: 26.2 PG (ref 26–34)
MCH RBC QN AUTO: 26.3 PG (ref 26–34)
MCH RBC QN AUTO: 26.5 PG (ref 26–34)
MCH RBC QN AUTO: 26.7 PG (ref 26–34)
MCH RBC QN AUTO: 26.7 PG (ref 26–34)
MCHC RBC AUTO-ENTMCNC: 29.6 G/DL (ref 31–37)
MCHC RBC AUTO-ENTMCNC: 29.7 G/DL (ref 31–37)
MCHC RBC AUTO-ENTMCNC: 29.8 G/DL (ref 31–37)
MCHC RBC AUTO-ENTMCNC: 30 G/DL (ref 31–37)
MCHC RBC AUTO-ENTMCNC: 30.3 G/DL (ref 31–37)
MCHC RBC AUTO-ENTMCNC: 31.1 G/DL (ref 31–37)
MCV RBC AUTO: 85.8 FL (ref 80–100)
MCV RBC AUTO: 87.5 FL (ref 80–100)
MCV RBC AUTO: 87.6 FL (ref 80–100)
MCV RBC AUTO: 88.4 FL (ref 80–100)
MCV RBC AUTO: 88.8 FL (ref 80–100)
MCV RBC AUTO: 89.1 FL (ref 80–100)
MONOCYTES # BLD: 11 % (ref 6–14)
MONOCYTES # BLD: 13 % (ref 6–14)
MONOCYTES # BLD: 14 % (ref 6–14)
MONOCYTES # BLD: 14 % (ref 6–14)
MONOCYTES # BLD: 15 % (ref 6–14)
MONOCYTES # BLD: 20 % (ref 6–14)
MORPHOLOGY: ABNORMAL
MORPHOLOGY: ABNORMAL
MYOGLOBIN: 39 NG/ML (ref 28–72)
NRBC AUTOMATED: ABNORMAL PER 100 WBC
PDW BLD-RTO: 17.3 % (ref 11–14.5)
PDW BLD-RTO: 17.8 % (ref 11–14.5)
PDW BLD-RTO: 18.3 % (ref 11–14.5)
PDW BLD-RTO: 18.4 % (ref 11–14.5)
PDW BLD-RTO: 18.7 % (ref 11–14.5)
PDW BLD-RTO: 22.4 % (ref 11–14.5)
PLATELET # BLD: 258 K/UL (ref 140–450)
PLATELET # BLD: 265 K/UL (ref 140–450)
PLATELET # BLD: 266 K/UL (ref 140–450)
PLATELET # BLD: 270 K/UL (ref 140–450)
PLATELET # BLD: 275 K/UL (ref 140–450)
PLATELET # BLD: 277 K/UL (ref 140–450)
PLATELET ESTIMATE: ABNORMAL
PMV BLD AUTO: 8.5 FL (ref 6–12)
PMV BLD AUTO: 8.6 FL (ref 6–12)
PMV BLD AUTO: 8.7 FL (ref 6–12)
PMV BLD AUTO: 9.4 FL (ref 6–12)
POC CREATININE: 0.96 MG/DL (ref 0.51–1.19)
POTASSIUM SERPL-SCNC: 3.9 MMOL/L (ref 3.7–5.3)
POTASSIUM SERPL-SCNC: 4.2 MMOL/L (ref 3.7–5.3)
POTASSIUM SERPL-SCNC: 4.2 MMOL/L (ref 3.7–5.3)
POTASSIUM SERPL-SCNC: 4.3 MMOL/L (ref 3.7–5.3)
POTASSIUM SERPL-SCNC: 4.4 MMOL/L (ref 3.7–5.3)
POTASSIUM SERPL-SCNC: 4.5 MMOL/L (ref 3.7–5.3)
POTASSIUM SERPL-SCNC: 4.7 MMOL/L (ref 3.7–5.3)
PRO-BNP: 1458 PG/ML
PROTHROMBIN TIME: 12.3 SEC (ref 9.4–11.3)
RBC # BLD: 5.45 M/UL (ref 4.5–5.9)
RBC # BLD: 5.5 M/UL (ref 4.5–5.9)
RBC # BLD: 5.5 M/UL (ref 4.5–5.9)
RBC # BLD: 5.6 M/UL (ref 4.5–5.9)
RBC # BLD: 5.75 M/UL (ref 4.5–5.9)
RBC # BLD: 5.84 M/UL (ref 4.5–5.9)
RBC # BLD: ABNORMAL 10*6/UL
SEG NEUTROPHILS: 54 % (ref 44–74)
SEG NEUTROPHILS: 55 % (ref 44–74)
SEG NEUTROPHILS: 57 % (ref 44–74)
SEG NEUTROPHILS: 58 % (ref 44–74)
SEG NEUTROPHILS: 61 % (ref 44–74)
SEG NEUTROPHILS: 67 % (ref 44–74)
SEGMENTED NEUTROPHILS ABSOLUTE COUNT: 4.1 K/UL (ref 1.8–7.7)
SEGMENTED NEUTROPHILS ABSOLUTE COUNT: 4.2 K/UL (ref 1.8–7.7)
SEGMENTED NEUTROPHILS ABSOLUTE COUNT: 4.4 K/UL (ref 1.8–7.7)
SEGMENTED NEUTROPHILS ABSOLUTE COUNT: 5.3 K/UL (ref 1.8–7.7)
SEGMENTED NEUTROPHILS ABSOLUTE COUNT: 5.42 K/UL (ref 1.8–7.7)
SEGMENTED NEUTROPHILS ABSOLUTE COUNT: 8.18 K/UL (ref 1.8–7.7)
SODIUM BLD-SCNC: 142 MMOL/L (ref 135–144)
SODIUM BLD-SCNC: 145 MMOL/L (ref 135–144)
SODIUM BLD-SCNC: 147 MMOL/L (ref 135–144)
SODIUM BLD-SCNC: 147 MMOL/L (ref 135–144)
SODIUM BLD-SCNC: 149 MMOL/L (ref 135–144)
TOTAL PROTEIN: 7.7 G/DL (ref 6.4–8.3)
TRIGL SERPL-MCNC: 160 MG/DL
TROPONIN INTERP: NORMAL
TROPONIN INTERP: NORMAL
TROPONIN T: <0.03 NG/ML
TROPONIN T: <0.03 NG/ML
VLDLC SERPL CALC-MCNC: ABNORMAL MG/DL (ref 1–30)
WBC # BLD: 12.2 K/UL (ref 3.5–11)
WBC # BLD: 7.6 K/UL (ref 3.5–11)
WBC # BLD: 7.6 K/UL (ref 3.5–11)
WBC # BLD: 7.7 K/UL (ref 3.5–11)
WBC # BLD: 8.9 K/UL (ref 3.5–11)
WBC # BLD: 9 K/UL (ref 3.5–11)
WBC # BLD: ABNORMAL 10*3/UL

## 2018-01-01 PROCEDURE — 84484 ASSAY OF TROPONIN QUANT: CPT

## 2018-01-01 PROCEDURE — 82947 ASSAY GLUCOSE BLOOD QUANT: CPT

## 2018-01-01 PROCEDURE — 99232 SBSQ HOSP IP/OBS MODERATE 35: CPT | Performed by: INTERNAL MEDICINE

## 2018-01-01 PROCEDURE — 99214 OFFICE O/P EST MOD 30 MIN: CPT | Performed by: INTERNAL MEDICINE

## 2018-01-01 PROCEDURE — 6370000000 HC RX 637 (ALT 250 FOR IP): Performed by: INTERNAL MEDICINE

## 2018-01-01 PROCEDURE — 6360000002 HC RX W HCPCS: Performed by: INTERNAL MEDICINE

## 2018-01-01 PROCEDURE — 99213 OFFICE O/P EST LOW 20 MIN: CPT | Performed by: INTERNAL MEDICINE

## 2018-01-01 PROCEDURE — 94640 AIRWAY INHALATION TREATMENT: CPT

## 2018-01-01 PROCEDURE — 94660 CPAP INITIATION&MGMT: CPT

## 2018-01-01 PROCEDURE — 99213 OFFICE O/P EST LOW 20 MIN: CPT | Performed by: NURSE PRACTITIONER

## 2018-01-01 PROCEDURE — 80048 BASIC METABOLIC PNL TOTAL CA: CPT

## 2018-01-01 PROCEDURE — 93000 ELECTROCARDIOGRAM COMPLETE: CPT | Performed by: INTERNAL MEDICINE

## 2018-01-01 PROCEDURE — 2580000003 HC RX 258: Performed by: INTERNAL MEDICINE

## 2018-01-01 PROCEDURE — 71045 X-RAY EXAM CHEST 1 VIEW: CPT

## 2018-01-01 PROCEDURE — 36415 COLL VENOUS BLD VENIPUNCTURE: CPT

## 2018-01-01 PROCEDURE — 93005 ELECTROCARDIOGRAM TRACING: CPT

## 2018-01-01 PROCEDURE — 85025 COMPLETE CBC W/AUTO DIFF WBC: CPT

## 2018-01-01 PROCEDURE — 93306 TTE W/DOPPLER COMPLETE: CPT

## 2018-01-01 PROCEDURE — 99238 HOSP IP/OBS DSCHRG MGMT 30/<: CPT | Performed by: FAMILY MEDICINE

## 2018-01-01 PROCEDURE — 99285 EMERGENCY DEPT VISIT HI MDM: CPT

## 2018-01-01 PROCEDURE — 73562 X-RAY EXAM OF KNEE 3: CPT

## 2018-01-01 PROCEDURE — 80061 LIPID PANEL: CPT

## 2018-01-01 PROCEDURE — 94761 N-INVAS EAR/PLS OXIMETRY MLT: CPT

## 2018-01-01 PROCEDURE — 2060000000 HC ICU INTERMEDIATE R&B

## 2018-01-01 PROCEDURE — 83880 ASSAY OF NATRIURETIC PEPTIDE: CPT

## 2018-01-01 PROCEDURE — 99396 PREV VISIT EST AGE 40-64: CPT | Performed by: INTERNAL MEDICINE

## 2018-01-01 PROCEDURE — 99202 OFFICE O/P NEW SF 15 MIN: CPT | Performed by: PHYSICIAN ASSISTANT

## 2018-01-01 PROCEDURE — 94760 N-INVAS EAR/PLS OXIMETRY 1: CPT

## 2018-01-01 PROCEDURE — 6360000004 HC RX CONTRAST MEDICATION: Performed by: INTERNAL MEDICINE

## 2018-01-01 PROCEDURE — 93306 TTE W/DOPPLER COMPLETE: CPT | Performed by: INTERNAL MEDICINE

## 2018-01-01 PROCEDURE — 99215 OFFICE O/P EST HI 40 MIN: CPT | Performed by: INTERNAL MEDICINE

## 2018-01-01 PROCEDURE — 94150 VITAL CAPACITY TEST: CPT

## 2018-01-01 PROCEDURE — 83874 ASSAY OF MYOGLOBIN: CPT

## 2018-01-01 PROCEDURE — 20610 DRAIN/INJ JOINT/BURSA W/O US: CPT | Performed by: PHYSICIAN ASSISTANT

## 2018-01-01 PROCEDURE — 71046 X-RAY EXAM CHEST 2 VIEWS: CPT

## 2018-01-01 PROCEDURE — 99253 IP/OBS CNSLTJ NEW/EST LOW 45: CPT | Performed by: INTERNAL MEDICINE

## 2018-01-01 PROCEDURE — 6370000000 HC RX 637 (ALT 250 FOR IP)

## 2018-01-01 PROCEDURE — 85610 PROTHROMBIN TIME: CPT

## 2018-01-01 PROCEDURE — 83036 HEMOGLOBIN GLYCOSYLATED A1C: CPT

## 2018-01-01 PROCEDURE — 6360000002 HC RX W HCPCS: Performed by: EMERGENCY MEDICINE

## 2018-01-01 PROCEDURE — 80053 COMPREHEN METABOLIC PANEL: CPT

## 2018-01-01 PROCEDURE — 82565 ASSAY OF CREATININE: CPT

## 2018-01-01 PROCEDURE — 71275 CT ANGIOGRAPHY CHEST: CPT

## 2018-01-01 RX ORDER — BUMETANIDE 2 MG/1
1 TABLET ORAL 2 TIMES DAILY
Qty: 60 TABLET | Refills: 1 | Status: SHIPPED | OUTPATIENT
Start: 2018-01-01 | End: 2018-01-01 | Stop reason: DRUGHIGH

## 2018-01-01 RX ORDER — TRIAMCINOLONE ACETONIDE 40 MG/ML
80 INJECTION, SUSPENSION INTRA-ARTICULAR; INTRAMUSCULAR ONCE
Status: COMPLETED | OUTPATIENT
Start: 2018-01-01 | End: 2018-01-01

## 2018-01-01 RX ORDER — BUMETANIDE 2 MG/1
2 TABLET ORAL DAILY
COMMUNITY
End: 2019-01-01 | Stop reason: SDUPTHER

## 2018-01-01 RX ORDER — PANTOPRAZOLE SODIUM 40 MG/1
40 TABLET, DELAYED RELEASE ORAL DAILY
Status: DISCONTINUED | OUTPATIENT
Start: 2018-01-01 | End: 2018-01-01 | Stop reason: HOSPADM

## 2018-01-01 RX ORDER — LISINOPRIL 5 MG/1
5 TABLET ORAL DAILY
Status: DISCONTINUED | OUTPATIENT
Start: 2018-01-01 | End: 2018-01-01 | Stop reason: HOSPADM

## 2018-01-01 RX ORDER — ASPIRIN 81 MG/1
81 TABLET ORAL DAILY
Status: DISCONTINUED | OUTPATIENT
Start: 2018-01-01 | End: 2018-01-01 | Stop reason: HOSPADM

## 2018-01-01 RX ORDER — SIMVASTATIN 20 MG
20 TABLET ORAL NIGHTLY
Qty: 90 TABLET | Refills: 3 | Status: SHIPPED | OUTPATIENT
Start: 2018-01-01 | End: 2019-01-01 | Stop reason: SDUPTHER

## 2018-01-01 RX ORDER — DEXTROSE MONOHYDRATE 50 MG/ML
100 INJECTION, SOLUTION INTRAVENOUS PRN
Status: DISCONTINUED | OUTPATIENT
Start: 2018-01-01 | End: 2018-01-01 | Stop reason: HOSPADM

## 2018-01-01 RX ORDER — FUROSEMIDE 10 MG/ML
40 INJECTION INTRAMUSCULAR; INTRAVENOUS ONCE
Status: COMPLETED | OUTPATIENT
Start: 2018-01-01 | End: 2018-01-01

## 2018-01-01 RX ORDER — DEXTROSE MONOHYDRATE 25 G/50ML
12.5 INJECTION, SOLUTION INTRAVENOUS PRN
Status: DISCONTINUED | OUTPATIENT
Start: 2018-01-01 | End: 2018-01-01 | Stop reason: HOSPADM

## 2018-01-01 RX ORDER — SIMVASTATIN 20 MG
TABLET ORAL
Qty: 30 TABLET | Refills: 0 | OUTPATIENT
Start: 2018-01-01

## 2018-01-01 RX ORDER — ALBUTEROL SULFATE 2.5 MG/3ML
2.5 SOLUTION RESPIRATORY (INHALATION) 4 TIMES DAILY
Status: DISCONTINUED | OUTPATIENT
Start: 2018-01-01 | End: 2018-01-01 | Stop reason: HOSPADM

## 2018-01-01 RX ORDER — BUPIVACAINE HYDROCHLORIDE 2.5 MG/ML
5 INJECTION, SOLUTION INFILTRATION; PERINEURAL ONCE
Status: COMPLETED | OUTPATIENT
Start: 2018-01-01 | End: 2018-01-01

## 2018-01-01 RX ORDER — ALBUTEROL SULFATE 2.5 MG/3ML
2.5 SOLUTION RESPIRATORY (INHALATION)
Status: DISCONTINUED | OUTPATIENT
Start: 2018-01-01 | End: 2018-01-01 | Stop reason: HOSPADM

## 2018-01-01 RX ORDER — SODIUM CHLORIDE 0.9 % (FLUSH) 0.9 %
10 SYRINGE (ML) INJECTION PRN
Status: DISCONTINUED | OUTPATIENT
Start: 2018-01-01 | End: 2018-01-01 | Stop reason: HOSPADM

## 2018-01-01 RX ORDER — FUROSEMIDE 10 MG/ML
40 INJECTION INTRAMUSCULAR; INTRAVENOUS 2 TIMES DAILY
Status: DISCONTINUED | OUTPATIENT
Start: 2018-01-01 | End: 2018-01-01 | Stop reason: HOSPADM

## 2018-01-01 RX ORDER — NICOTINE POLACRILEX 4 MG
15 LOZENGE BUCCAL PRN
Status: DISCONTINUED | OUTPATIENT
Start: 2018-01-01 | End: 2018-01-01 | Stop reason: HOSPADM

## 2018-01-01 RX ORDER — LISINOPRIL 5 MG/1
TABLET ORAL
Qty: 90 TABLET | Refills: 3 | Status: SHIPPED | OUTPATIENT
Start: 2018-01-01 | End: 2019-01-01 | Stop reason: SDUPTHER

## 2018-01-01 RX ORDER — SIMVASTATIN 20 MG
20 TABLET ORAL NIGHTLY
Status: DISCONTINUED | OUTPATIENT
Start: 2018-01-01 | End: 2018-01-01 | Stop reason: HOSPADM

## 2018-01-01 RX ORDER — ALBUTEROL SULFATE 2.5 MG/3ML
2.5 SOLUTION RESPIRATORY (INHALATION)
Status: DISCONTINUED | OUTPATIENT
Start: 2018-01-01 | End: 2018-01-01 | Stop reason: SDUPTHER

## 2018-01-01 RX ORDER — CARVEDILOL 6.25 MG/1
6.25 TABLET ORAL 2 TIMES DAILY WITH MEALS
Qty: 60 TABLET | Refills: 3 | Status: SHIPPED | OUTPATIENT
Start: 2018-01-01 | End: 2019-01-01 | Stop reason: SDUPTHER

## 2018-01-01 RX ORDER — SODIUM CHLORIDE FOR INHALATION 0.9 %
3 VIAL, NEBULIZER (ML) INHALATION
Status: DISCONTINUED | OUTPATIENT
Start: 2018-01-01 | End: 2018-01-01 | Stop reason: HOSPADM

## 2018-01-01 RX ORDER — ACETAMINOPHEN 325 MG/1
650 TABLET ORAL EVERY 4 HOURS PRN
Status: DISCONTINUED | OUTPATIENT
Start: 2018-01-01 | End: 2018-01-01 | Stop reason: HOSPADM

## 2018-01-01 RX ORDER — ACETAMINOPHEN 325 MG/1
TABLET ORAL
Status: COMPLETED
Start: 2018-01-01 | End: 2018-01-01

## 2018-01-01 RX ORDER — SODIUM CHLORIDE 0.9 % (FLUSH) 0.9 %
10 SYRINGE (ML) INJECTION EVERY 12 HOURS SCHEDULED
Status: DISCONTINUED | OUTPATIENT
Start: 2018-01-01 | End: 2018-01-01 | Stop reason: HOSPADM

## 2018-01-01 RX ORDER — LACTULOSE 10 G/15ML
20 SOLUTION ORAL 3 TIMES DAILY
Status: DISCONTINUED | OUTPATIENT
Start: 2018-01-01 | End: 2018-01-01 | Stop reason: HOSPADM

## 2018-01-01 RX ORDER — FLUTICASONE PROPIONATE 50 MCG
1 SPRAY, SUSPENSION (ML) NASAL DAILY PRN
Status: DISCONTINUED | OUTPATIENT
Start: 2018-01-01 | End: 2018-01-01 | Stop reason: HOSPADM

## 2018-01-01 RX ADMIN — ASPIRIN 81 MG: 81 TABLET, COATED ORAL at 09:16

## 2018-01-01 RX ADMIN — ALBUTEROL SULFATE 2.5 MG: 2.5 SOLUTION RESPIRATORY (INHALATION) at 20:28

## 2018-01-01 RX ADMIN — LACTULOSE 20 G: 20 SOLUTION ORAL at 13:45

## 2018-01-01 RX ADMIN — METOPROLOL TARTRATE 25 MG: 25 TABLET ORAL at 08:33

## 2018-01-01 RX ADMIN — BUPIVACAINE HYDROCHLORIDE 12.5 MG: 2.5 INJECTION, SOLUTION INFILTRATION; PERINEURAL at 11:33

## 2018-01-01 RX ADMIN — LISINOPRIL 5 MG: 5 TABLET ORAL at 20:03

## 2018-01-01 RX ADMIN — MOMETASONE FUROATE AND FORMOTEROL FUMARATE DIHYDRATE 2 PUFF: 200; 5 AEROSOL RESPIRATORY (INHALATION) at 08:38

## 2018-01-01 RX ADMIN — Medication 10 ML: at 08:37

## 2018-01-01 RX ADMIN — LACTULOSE 20 G: 20 SOLUTION ORAL at 08:07

## 2018-01-01 RX ADMIN — MOMETASONE FUROATE AND FORMOTEROL FUMARATE DIHYDRATE 2 PUFF: 200; 5 AEROSOL RESPIRATORY (INHALATION) at 20:36

## 2018-01-01 RX ADMIN — PANTOPRAZOLE SODIUM 40 MG: 40 TABLET, DELAYED RELEASE ORAL at 08:32

## 2018-01-01 RX ADMIN — MOMETASONE FUROATE AND FORMOTEROL FUMARATE DIHYDRATE 2 PUFF: 200; 5 AEROSOL RESPIRATORY (INHALATION) at 08:23

## 2018-01-01 RX ADMIN — Medication 10 ML: at 08:08

## 2018-01-01 RX ADMIN — ALBUTEROL SULFATE 2.5 MG: 2.5 SOLUTION RESPIRATORY (INHALATION) at 08:36

## 2018-01-01 RX ADMIN — FUROSEMIDE 40 MG: 10 INJECTION, SOLUTION INTRAMUSCULAR; INTRAVENOUS at 08:07

## 2018-01-01 RX ADMIN — SIMVASTATIN 20 MG: 20 TABLET, FILM COATED ORAL at 21:18

## 2018-01-01 RX ADMIN — FUROSEMIDE 40 MG: 10 INJECTION, SOLUTION INTRAMUSCULAR; INTRAVENOUS at 08:34

## 2018-01-01 RX ADMIN — MOMETASONE FUROATE AND FORMOTEROL FUMARATE DIHYDRATE 2 PUFF: 200; 5 AEROSOL RESPIRATORY (INHALATION) at 19:58

## 2018-01-01 RX ADMIN — LISINOPRIL 5 MG: 5 TABLET ORAL at 09:16

## 2018-01-01 RX ADMIN — SIMVASTATIN 20 MG: 20 TABLET, FILM COATED ORAL at 21:21

## 2018-01-01 RX ADMIN — MOMETASONE FUROATE AND FORMOTEROL FUMARATE DIHYDRATE 2 PUFF: 200; 5 AEROSOL RESPIRATORY (INHALATION) at 20:27

## 2018-01-01 RX ADMIN — Medication 10 ML: at 21:21

## 2018-01-01 RX ADMIN — Medication 10 ML: at 08:34

## 2018-01-01 RX ADMIN — METOPROLOL TARTRATE 25 MG: 25 TABLET ORAL at 08:08

## 2018-01-01 RX ADMIN — LACTULOSE 20 G: 20 SOLUTION ORAL at 20:45

## 2018-01-01 RX ADMIN — ALBUTEROL SULFATE 2.5 MG: 2.5 SOLUTION RESPIRATORY (INHALATION) at 11:55

## 2018-01-01 RX ADMIN — PANTOPRAZOLE SODIUM 40 MG: 40 TABLET, DELAYED RELEASE ORAL at 08:36

## 2018-01-01 RX ADMIN — Medication 10 ML: at 09:16

## 2018-01-01 RX ADMIN — ALBUTEROL SULFATE 2.5 MG: 2.5 SOLUTION RESPIRATORY (INHALATION) at 09:10

## 2018-01-01 RX ADMIN — METOPROLOL TARTRATE 25 MG: 25 TABLET ORAL at 20:16

## 2018-01-01 RX ADMIN — FUROSEMIDE 40 MG: 10 INJECTION, SOLUTION INTRAMUSCULAR; INTRAVENOUS at 17:24

## 2018-01-01 RX ADMIN — INSULIN LISPRO 1 UNITS: 100 INJECTION, SOLUTION INTRAVENOUS; SUBCUTANEOUS at 20:16

## 2018-01-01 RX ADMIN — METOPROLOL TARTRATE 25 MG: 25 TABLET ORAL at 20:45

## 2018-01-01 RX ADMIN — Medication 10 ML: at 20:12

## 2018-01-01 RX ADMIN — ASPIRIN 81 MG: 81 TABLET, COATED ORAL at 20:03

## 2018-01-01 RX ADMIN — FUROSEMIDE 40 MG: 10 INJECTION, SOLUTION INTRAMUSCULAR; INTRAVENOUS at 18:03

## 2018-01-01 RX ADMIN — SIMVASTATIN 20 MG: 20 TABLET, FILM COATED ORAL at 20:45

## 2018-01-01 RX ADMIN — ASPIRIN 81 MG: 81 TABLET, COATED ORAL at 08:08

## 2018-01-01 RX ADMIN — FUROSEMIDE 40 MG: 10 INJECTION, SOLUTION INTRAMUSCULAR; INTRAVENOUS at 17:16

## 2018-01-01 RX ADMIN — Medication 10 ML: at 20:48

## 2018-01-01 RX ADMIN — LACTULOSE 20 G: 20 SOLUTION ORAL at 21:18

## 2018-01-01 RX ADMIN — IOPAMIDOL 75 ML: 755 INJECTION, SOLUTION INTRAVENOUS at 14:29

## 2018-01-01 RX ADMIN — TRIAMCINOLONE ACETONIDE 80 MG: 40 INJECTION, SUSPENSION INTRA-ARTICULAR; INTRAMUSCULAR at 11:33

## 2018-01-01 RX ADMIN — SERTRALINE HYDROCHLORIDE 25 MG: 50 TABLET ORAL at 21:19

## 2018-01-01 RX ADMIN — ALBUTEROL SULFATE 2.5 MG: 2.5 SOLUTION RESPIRATORY (INHALATION) at 11:59

## 2018-01-01 RX ADMIN — INSULIN LISPRO 1 UNITS: 100 INJECTION, SOLUTION INTRAVENOUS; SUBCUTANEOUS at 20:47

## 2018-01-01 RX ADMIN — ASPIRIN 81 MG: 81 TABLET, COATED ORAL at 08:36

## 2018-01-01 RX ADMIN — FUROSEMIDE 40 MG: 10 INJECTION, SOLUTION INTRAMUSCULAR; INTRAVENOUS at 18:04

## 2018-01-01 RX ADMIN — ALBUTEROL SULFATE 2.5 MG: 2.5 SOLUTION RESPIRATORY (INHALATION) at 16:30

## 2018-01-01 RX ADMIN — SERTRALINE HYDROCHLORIDE 25 MG: 50 TABLET ORAL at 20:03

## 2018-01-01 RX ADMIN — METOPROLOL TARTRATE 25 MG: 25 TABLET ORAL at 09:16

## 2018-01-01 RX ADMIN — ALBUTEROL SULFATE 2.5 MG: 2.5 SOLUTION RESPIRATORY (INHALATION) at 20:18

## 2018-01-01 RX ADMIN — LACTULOSE 20 G: 20 SOLUTION ORAL at 14:34

## 2018-01-01 RX ADMIN — ALBUTEROL SULFATE 2.5 MG: 2.5 SOLUTION RESPIRATORY (INHALATION) at 20:35

## 2018-01-01 RX ADMIN — PANTOPRAZOLE SODIUM 40 MG: 40 TABLET, DELAYED RELEASE ORAL at 08:08

## 2018-01-01 RX ADMIN — LISINOPRIL 5 MG: 5 TABLET ORAL at 08:32

## 2018-01-01 RX ADMIN — ASPIRIN 81 MG: 81 TABLET, COATED ORAL at 08:32

## 2018-01-01 RX ADMIN — PANTOPRAZOLE SODIUM 40 MG: 40 TABLET, DELAYED RELEASE ORAL at 09:16

## 2018-01-01 RX ADMIN — METOPROLOL TARTRATE 25 MG: 25 TABLET ORAL at 08:36

## 2018-01-01 RX ADMIN — SERTRALINE HYDROCHLORIDE 25 MG: 50 TABLET ORAL at 20:45

## 2018-01-01 RX ADMIN — Medication 10 ML: at 21:19

## 2018-01-01 RX ADMIN — ACETAMINOPHEN 650 MG: 325 TABLET ORAL at 08:35

## 2018-01-01 RX ADMIN — FUROSEMIDE 40 MG: 10 INJECTION, SOLUTION INTRAMUSCULAR; INTRAVENOUS at 08:37

## 2018-01-01 RX ADMIN — SERTRALINE HYDROCHLORIDE 25 MG: 50 TABLET ORAL at 08:32

## 2018-01-01 RX ADMIN — INSULIN LISPRO 1 UNITS: 100 INJECTION, SOLUTION INTRAVENOUS; SUBCUTANEOUS at 12:03

## 2018-01-01 RX ADMIN — IOPAMIDOL 75 ML: 755 INJECTION, SOLUTION INTRAVENOUS at 14:30

## 2018-01-01 RX ADMIN — ALBUTEROL SULFATE 2.5 MG: 2.5 SOLUTION RESPIRATORY (INHALATION) at 16:46

## 2018-01-01 RX ADMIN — FUROSEMIDE 40 MG: 10 INJECTION, SOLUTION INTRAMUSCULAR; INTRAVENOUS at 09:16

## 2018-01-01 RX ADMIN — SIMVASTATIN 20 MG: 20 TABLET, FILM COATED ORAL at 20:04

## 2018-01-01 RX ADMIN — MOMETASONE FUROATE AND FORMOTEROL FUMARATE DIHYDRATE 2 PUFF: 200; 5 AEROSOL RESPIRATORY (INHALATION) at 08:28

## 2018-01-01 RX ADMIN — METOPROLOL TARTRATE 25 MG: 25 TABLET ORAL at 21:21

## 2018-01-01 RX ADMIN — ALBUTEROL SULFATE 2.5 MG: 2.5 SOLUTION RESPIRATORY (INHALATION) at 08:22

## 2018-01-01 RX ADMIN — ALBUTEROL SULFATE 2.5 MG: 2.5 SOLUTION RESPIRATORY (INHALATION) at 15:37

## 2018-01-01 RX ADMIN — LISINOPRIL 5 MG: 5 TABLET ORAL at 08:08

## 2018-01-01 RX ADMIN — Medication 10 ML: at 17:17

## 2018-01-01 RX ADMIN — LACTULOSE 20 G: 20 SOLUTION ORAL at 09:16

## 2018-01-01 RX ADMIN — ALBUTEROL SULFATE 2.5 MG: 2.5 SOLUTION RESPIRATORY (INHALATION) at 19:58

## 2018-01-01 RX ADMIN — Medication 10 ML: at 17:24

## 2018-01-01 RX ADMIN — ALBUTEROL SULFATE 2.5 MG: 2.5 SOLUTION RESPIRATORY (INHALATION) at 08:21

## 2018-01-01 RX ADMIN — PANTOPRAZOLE SODIUM 40 MG: 40 TABLET, DELAYED RELEASE ORAL at 20:04

## 2018-01-01 RX ADMIN — LISINOPRIL 5 MG: 5 TABLET ORAL at 08:36

## 2018-01-01 ASSESSMENT — ENCOUNTER SYMPTOMS
CHEST TIGHTNESS: 0
COUGH: 0
VOMITING: 0
CONSTIPATION: 0
SHORTNESS OF BREATH: 0
ABDOMINAL PAIN: 0
CONSTIPATION: 0
NAUSEA: 0
CONSTIPATION: 0
DIFFICULTY BREATHING: 0
BACK PAIN: 0
COUGH: 1
DIARRHEA: 0
DIARRHEA: 0
ABDOMINAL PAIN: 0
BACK PAIN: 0
CONSTIPATION: 0
VOMITING: 0
ABDOMINAL PAIN: 0
NAUSEA: 0
SHORTNESS OF BREATH: 0
NAUSEA: 0
EYE PAIN: 0
VOMITING: 0
BLOOD IN STOOL: 0
BLOOD IN STOOL: 0
EYE PAIN: 0
WHEEZING: 0
SHORTNESS OF BREATH: 0
NAUSEA: 0
BACK PAIN: 0
SHORTNESS OF BREATH: 1
NAUSEA: 0
DIARRHEA: 0
ABDOMINAL PAIN: 0
BACK PAIN: 0
COUGH: 0
SORE THROAT: 0
PRIMARY PULMONARY SYMPTOMS: SEVERE
VOMITING: 0
DIARRHEA: 0
BLOOD IN STOOL: 0
BLOOD IN STOOL: 0
EYE PAIN: 0
SHORTNESS OF BREATH: 0
COUGH: 0
TROUBLE SWALLOWING: 0
VOMITING: 0

## 2018-01-01 ASSESSMENT — PAIN SCALES - GENERAL
PAINLEVEL_OUTOF10: 0
PAINLEVEL_OUTOF10: 3
PAINLEVEL_OUTOF10: 0
PAINLEVEL_OUTOF10: 3
PAINLEVEL_OUTOF10: 0
PAINLEVEL_OUTOF10: 0
PAINLEVEL_OUTOF10: 3
PAINLEVEL_OUTOF10: 0
PAINLEVEL_OUTOF10: 5
PAINLEVEL_OUTOF10: 0

## 2018-01-01 ASSESSMENT — PAIN DESCRIPTION - PAIN TYPE
TYPE: ACUTE PAIN

## 2018-01-01 ASSESSMENT — PATIENT HEALTH QUESTIONNAIRE - PHQ9
SUM OF ALL RESPONSES TO PHQ QUESTIONS 1-9: 0
SUM OF ALL RESPONSES TO PHQ QUESTIONS 1-9: 0
SUM OF ALL RESPONSES TO PHQ9 QUESTIONS 1 & 2: 0
1. LITTLE INTEREST OR PLEASURE IN DOING THINGS: 0
2. FEELING DOWN, DEPRESSED OR HOPELESS: 0

## 2018-01-01 ASSESSMENT — PAIN DESCRIPTION - FREQUENCY
FREQUENCY: CONTINUOUS
FREQUENCY: CONTINUOUS

## 2018-01-01 ASSESSMENT — PAIN DESCRIPTION - DESCRIPTORS
DESCRIPTORS: PRESSURE
DESCRIPTORS: ACHING

## 2018-01-01 ASSESSMENT — PAIN DESCRIPTION - LOCATION
LOCATION: HEAD;NECK
LOCATION: HEAD

## 2018-01-01 ASSESSMENT — COPD QUESTIONNAIRES
COPD: 1

## 2018-01-17 ENCOUNTER — TELEPHONE (OUTPATIENT)
Dept: CARDIOTHORACIC SURGERY | Age: 43
End: 2018-01-17

## 2018-03-02 ENCOUNTER — OFFICE VISIT (OUTPATIENT)
Dept: INTERNAL MEDICINE | Age: 43
End: 2018-03-02
Payer: COMMERCIAL

## 2018-03-02 VITALS
TEMPERATURE: 99.1 F | DIASTOLIC BLOOD PRESSURE: 60 MMHG | BODY MASS INDEX: 41.75 KG/M2 | HEART RATE: 96 BPM | RESPIRATION RATE: 20 BRPM | OXYGEN SATURATION: 91 % | WEIGHT: 315 LBS | SYSTOLIC BLOOD PRESSURE: 110 MMHG | HEIGHT: 73 IN

## 2018-03-02 DIAGNOSIS — E11.9 TYPE 2 DIABETES MELLITUS WITHOUT COMPLICATION, WITHOUT LONG-TERM CURRENT USE OF INSULIN (HCC): ICD-10-CM

## 2018-03-02 DIAGNOSIS — Z13.220 SCREENING FOR LIPOID DISORDERS: ICD-10-CM

## 2018-03-02 DIAGNOSIS — I10 ESSENTIAL HYPERTENSION: ICD-10-CM

## 2018-03-02 DIAGNOSIS — J44.9 CHRONIC OBSTRUCTIVE PULMONARY DISEASE, UNSPECIFIED COPD TYPE (HCC): Primary | ICD-10-CM

## 2018-03-02 DIAGNOSIS — J01.90 ACUTE SINUSITIS, RECURRENCE NOT SPECIFIED, UNSPECIFIED LOCATION: ICD-10-CM

## 2018-03-02 PROCEDURE — 99214 OFFICE O/P EST MOD 30 MIN: CPT | Performed by: INTERNAL MEDICINE

## 2018-03-02 RX ORDER — AMOXICILLIN AND CLAVULANATE POTASSIUM 875; 125 MG/1; MG/1
1 TABLET, FILM COATED ORAL 2 TIMES DAILY
Qty: 20 TABLET | Refills: 0 | Status: SHIPPED | OUTPATIENT
Start: 2018-03-02 | End: 2018-03-12

## 2018-03-02 RX ORDER — SERTRALINE HYDROCHLORIDE 25 MG/1
25 TABLET, FILM COATED ORAL DAILY
Qty: 90 TABLET | Refills: 3 | Status: SHIPPED | OUTPATIENT
Start: 2018-03-02 | End: 2019-01-01 | Stop reason: DRUGHIGH

## 2018-03-02 RX ORDER — FUROSEMIDE 40 MG/1
40 TABLET ORAL DAILY
Qty: 90 TABLET | Refills: 3 | Status: ON HOLD | OUTPATIENT
Start: 2018-03-02 | End: 2018-01-01 | Stop reason: HOSPADM

## 2018-03-02 ASSESSMENT — COPD QUESTIONNAIRES: COPD: 1

## 2018-03-02 ASSESSMENT — ENCOUNTER SYMPTOMS
NAUSEA: 0
CONSTIPATION: 0
COUGH: 0
BLOOD IN STOOL: 0
DIARRHEA: 0
EYE PAIN: 0
SHORTNESS OF BREATH: 0
VOMITING: 0
ABDOMINAL PAIN: 0
BACK PAIN: 0

## 2018-03-02 NOTE — PROGRESS NOTES
[address] [facility]  [address] [address]  Dept: [phone]  Dept Fax: [phone]  Loc: [phone]    Savi Dodson is a 37 y.o. male who presents today for his medical conditions/complaints as noted below. Savi Dodson is c/o of   Chief Complaint   Patient presents with    COPD    Hypertension    Diabetes    Cough     dry cough, SOB. No ST, no sinus congestion         HPI:     COPD   There is no cough or shortness of breath. This is a chronic problem. The current episode started more than 1 year ago. The problem occurs constantly. The problem has been waxing and waning. Pertinent negatives include no chest pain, fever or headaches. Hypertension   This is a chronic problem. The current episode started more than 1 year ago. The problem has been waxing and waning since onset. The problem is controlled. Pertinent negatives include no chest pain, headaches, neck pain, palpitations or shortness of breath. Diabetes   He presents for his follow-up diabetic visit. He has type 2 diabetes mellitus. His disease course has been fluctuating. Pertinent negatives for hypoglycemia include no confusion, dizziness, headaches, nervousness/anxiousness or pallor. Pertinent negatives for diabetes include no chest pain, no polydipsia, no polyuria and no weakness. Hemoglobin A1C (%)   Date Value   10/27/2017 7.5 (H)   10/06/2017 7.5 (H)   03/24/2016 6.3 (H)                Microalb/Crt.  Ratio (mcg/mg creat)   Date Value   10/27/2017 CANNOT BE CALCULATED     LDL Cholesterol (mg/dL)   Date Value   03/24/2017 139 (H)   05/20/2015 114         AST (U/L)   Date Value   10/02/2017 DISREGARD ANSWER   10/02/2017 32     ALT (U/L)   Date Value   10/02/2017 DISREGARD ANSWER   10/02/2017 22     BUN (mg/dL)   Date Value   10/27/2017 16     BP Readings from Last 3 Encounters:   03/02/18 110/60   10/19/17 139/75   10/10/17 136/88              Past Medical History:   Diagnosis Date    Anxiety Procedures    Handicap placard    Comprehensive Metabolic Panel     Standing Status:   Future     Standing Expiration Date:   3/2/2019    Lipid Panel     Standing Status:   Future     Standing Expiration Date:   3/2/2019     Order Specific Question:   Is Patient Fasting?/# of Hours     Answer:   12    Hemoglobin A1C     Standing Status:   Future     Standing Expiration Date:   3/2/2019   Sharion Sandifer, MD, Pulmonology [place]*     Referral Priority:   Routine     Referral Type:   Consult for Advice and Opinion     Referral Reason:   Specialty Services Required     Referred to Provider:   John Yanez MD     Requested Specialty:   Pulmonology     Number of Visits Requested:   1     Orders Placed This Encounter   Medications    amoxicillin-clavulanate (AUGMENTIN) 875-125 MG per tablet     Sig: Take 1 tablet by mouth 2 times daily for 10 days     Dispense:  20 tablet     Refill:  0    furosemide (LASIX) 40 MG tablet     Sig: Take 1 tablet by mouth daily     Dispense:  90 tablet     Refill:  3    sertraline (ZOLOFT) 25 MG tablet     Sig: Take 1 tablet by mouth daily     Dispense:  90 tablet     Refill:  3    Handicap Placard MISC     Sig: by Does not apply route Expires: 3/2/2020     Dispense:  1 each     Refill:  0       Patient given educational materials - see patient instructions. Discussed use, benefit, and side effects of prescribed medications. All patient questions answered. Pt voiced understanding. Reviewed health maintenance. Instructed to continue current medications, diet and exercise. Patient agreed with treatment plan. Follow up as directed.      Electronically signed by Sue Mackey MD on 3/2/2018 at 2:18 PM

## 2018-03-14 ENCOUNTER — TELEPHONE (OUTPATIENT)
Dept: INTERNAL MEDICINE | Age: 43
End: 2018-03-14

## 2018-03-14 NOTE — TELEPHONE ENCOUNTER
Last appt: 3/2/2018  Next appt: 6/8/2018    Patient is needing  to write a letter stating that he can only work 8 hours a day. Patient work is wanting him to go to 12 hour shifts and he is needing  to write the letter saying that patient can only work 8 hours.   Fax the letter to [employer] [phone]

## 2018-03-14 NOTE — TELEPHONE ENCOUNTER
Keila, right let her. Type let her and present to me for signature. However, ask patient to call his human resources department, etc., to ask the consequences limiting to 8 hours. For example, will he lose his job if he does not do 12 hours, and he is and is he willing to accept those consequences?

## 2018-03-14 NOTE — TELEPHONE ENCOUNTER
Spoke with pt- informed him that letter will be faxed now. He will not have any job consequences from working 8 hours instead of 12.

## 2018-03-15 ENCOUNTER — TELEPHONE (OUTPATIENT)
Dept: INTERNAL MEDICINE | Age: 43
End: 2018-03-15

## 2018-03-15 NOTE — LETTER
[facility]  [address]  Phone: [phone]  Fax: [phone]    Melisa Pedraza MD        March 15, 2018     Patient: Elizabeth Peters   YOB: 1975   Date of Visit: 3/15/2018       To Whom it May Concern:    Jackson Grant has severe COPD which can cause episodes of shortness of breath. Please excuse him from work 3-14-18 through 3-21-18. He may return to work on 3-22-18. If you have any questions or concerns, please don't hesitate to call.     Sincerely,         Melisa Pedraza MD

## 2018-03-15 NOTE — TELEPHONE ENCOUNTER
Spoke with pt re: appt that is scheduled for Monday. Pt needs a return to work slip. He was sent home yesterday due to SOB. He denies any distress today. Informed pt that if his symptoms worsen he will need to be seen ([facility] or ER), but Dr. Boni Joya will write return to work slip due to hi severe COPD. Pt does not need any scripts refill (he did not run out of any meds). Pt says he has an appt with the company doctor on Monday (3-19-18) and Pulm (Dr. Trisha Key 3-21-18). Pt would like to be off until he sees Pulm. Pt will  slip later today.

## 2018-03-20 DIAGNOSIS — J44.9 CHRONIC OBSTRUCTIVE PULMONARY DISEASE, UNSPECIFIED COPD TYPE (HCC): Primary | ICD-10-CM

## 2018-04-24 PROBLEM — J44.9 COPD (CHRONIC OBSTRUCTIVE PULMONARY DISEASE) (HCC): Status: ACTIVE | Noted: 2017-03-23

## 2018-04-24 PROBLEM — I50.43 CHF (CONGESTIVE HEART FAILURE), NYHA CLASS I, ACUTE ON CHRONIC, COMBINED (HCC): Status: ACTIVE | Noted: 2018-01-01

## 2018-04-24 PROBLEM — I50.9 CHF (CONGESTIVE HEART FAILURE) (HCC): Status: ACTIVE | Noted: 2018-01-01

## 2018-05-02 PROBLEM — I50.43 CHF (CONGESTIVE HEART FAILURE), NYHA CLASS I, ACUTE ON CHRONIC, COMBINED (HCC): Status: RESOLVED | Noted: 2018-01-01 | Resolved: 2018-01-01

## 2018-05-02 PROBLEM — J45.901 ASTHMA WITH ACUTE EXACERBATION: Status: RESOLVED | Noted: 2017-10-02 | Resolved: 2018-01-01

## 2018-05-02 PROBLEM — I50.813 ACUTE ON CHRONIC RIGHT HEART FAILURE (HCC): Status: RESOLVED | Noted: 2017-10-02 | Resolved: 2018-01-01

## 2018-05-02 PROBLEM — L03.311 ABDOMINAL WALL CELLULITIS: Status: RESOLVED | Noted: 2017-10-02 | Resolved: 2018-01-01

## 2018-07-09 NOTE — TELEPHONE ENCOUNTER
Spoke with pt- it is for his COPD. Asking if Dr. Kenyon Martyr Dr. Bennet Prader will fill out forms. He needs to turn in forms in the next couple of days, or he will lose 4 weeks of pay.

## 2018-07-09 NOTE — TELEPHONE ENCOUNTER
Patient was referred to [facility] physical medicine rehab for his disability forms. That office states they do not fill out these papers. Patient states that these papers are not for the state, just for work only. Dr. Farheen Liz did fill out these papers before in May 2018. Please advise.

## 2018-07-10 NOTE — PROGRESS NOTES
[address] [facility]  [address] [address]  Dept: [phone]  Dept Fax: [phone]  Loc: [phone]    Norbert West is a 37 y.o. male who presents today for his medical conditions/complaints as noted below. Norbert West is c/o of   Chief Complaint   Patient presents with    COPD     pt here to fill out Disablity Papers    Diabetes    Hypertension    Congestive Heart Failure         HPI:     COPD   There is no cough or shortness of breath. This is a chronic problem. The current episode started more than 1 month ago. The problem occurs constantly. The problem has been gradually worsening. Pertinent negatives include no chest pain, fever or headaches. Diabetes   He presents for his follow-up diabetic visit. He has type 2 diabetes mellitus. His disease course has been fluctuating. Pertinent negatives for hypoglycemia include no confusion, dizziness, headaches, nervousness/anxiousness or pallor. Pertinent negatives for diabetes include no chest pain, no polydipsia, no polyuria and no weakness. Hypertension   This is a chronic problem. The current episode started more than 1 year ago. The problem has been waxing and waning since onset. The problem is controlled. Pertinent negatives include no chest pain, headaches, neck pain, palpitations or shortness of breath. Congestive Heart Failure   Presents for follow-up visit. Pertinent negatives include no abdominal pain, chest pain, chest pressure, palpitations or shortness of breath. The symptoms have been stable. Compliance with total regimen is %. Compliance with diet is %. Compliance with exercise is %. Compliance with medications is %. Hemoglobin A1C (%)   Date Value   10/27/2017 7.5 (H)   10/06/2017 7.5 (H)   03/24/2016 6.3 (H)                Microalb/Crt.  Ratio (mcg/mg creat)   Date Value   10/27/2017 CANNOT BE CALCULATED     LDL Cholesterol (mg/dL)   Date Value   03/24/2017 139 (H) 05/20/2015 114         AST (U/L)   Date Value   10/02/2017 DISREGARD ANSWER   10/02/2017 32     ALT (U/L)   Date Value   10/02/2017 DISREGARD ANSWER   10/02/2017 22     BUN (mg/dL)   Date Value   06/13/2018 17     BP Readings from Last 3 Encounters:   07/10/18 132/80   06/26/18 134/80   06/13/18 132/69              Past Medical History:   Diagnosis Date    Anxiety     Arthritis     Asthma     childhood asthma    COPD (ch[facility] obstructive [facility] disease) [facility]     Diabetes mellitus ([facility]     GERD (gastroesophageal reflux disease)     Hypertension     Morbid obesity ([facility]     Peripheral edema     Thoracic ascending aortic aneurysm ([facility] 04/23/2015      Past Surgical History:   Procedure Laterality Date    ANTERIOR CRUCIATE LIGAMENT REPAIR Left     ANTERIOR CRUCIATE LIGAMENT REPAIR Left     ARM SURGERY Right     SPLENECTOMY         Family History   Problem Relation Age of Onset    Heart Disease Father        Social History   Substance Use Topics    Smoking status: Former Smoker     Packs/day: 1.00     Years: 5.00     Types: Cigarettes     Quit date: 1/12/2000    Smokeless tobacco: Never Used      Comment: quit smoking 1999.  syant rrt 4/24/2018    Alcohol use Yes      Comment: 2 beers a day but not everyday      Current Outpatient Prescriptions   Medication Sig Dispense Refill    bumetanide (BUMEX) 2 MG tablet Take 2 mg by mouth daily      mometasone-formoterol (DULERA) 200-5 MCG/ACT inhaler Inhale 2 puffs into the lungs 2 times daily 3 Inhaler 3    simvastatin (ZOCOR) 20 MG tablet Take 1 tablet by mouth nightly 90 tablet 3    metoprolol tartrate (LOPRESSOR) 25 MG tablet TAKE 1 TABLET BY MOUTH TWICE DAILY 180 tablet 3    metFORMIN (GLUCOPHAGE) 500 MG tablet TAKE 1 TABLET BY MOUTH TWICE DAILY WITH MEALS 180 tablet 3    PROAIR  (90 Base) MCG/ACT inhaler INHALE 2 PUFFS BY MOUTH INTO THE LUNGS EVERY 6 HOURS AS NEEDED FOR WHEEZING 8.5 g 3    sertraline (ZOLOFT) 25 MG tablet Take 1 tablet rash.   Neurological: Negative for dizziness, weakness, numbness and headaches. Hematological: Negative for adenopathy. Does not bruise/bleed easily. Psychiatric/Behavioral: Negative for confusion. The patient is not nervous/anxious. Objective:     Physical Exam   Constitutional: He is oriented to person, place, and time. He appears well-developed and well-nourished. HENT:   Head: Normocephalic and atraumatic. Eyes: EOM are normal. Pupils are equal, round, and reactive to light. Neck: Neck supple. Cardiovascular: Normal rate and regular rhythm. Exam reveals no gallop and no friction rub. No murmur heard. Pulmonary/Chest: Effort normal and breath sounds normal. He has no wheezes. He has no rales. Abdominal: Soft. He exhibits no distension and no mass. There is no tenderness. There is no rebound. Musculoskeletal: Normal range of motion. He exhibits no edema. Lymphadenopathy:     He has no cervical adenopathy. Neurological: He is alert and oriented to person, place, and time. No cranial nerve deficit (grossly). Skin: Skin is warm and dry. No rash noted. Psychiatric: He has a normal mood and affect. Thought content normal.   Vitals reviewed. /80 (Site: Right Arm, Position: Sitting, Cuff Size: Large Adult)   Pulse 112   Resp 20   Ht 6' (1.829 m)   Wt (!) 351 lb (159.2 kg)   SpO2 91% Comment: room air  BMI 47.60 kg/m²     Assessment:       Diagnosis Orders   1. Chronic obstructive pulmonary disease, unspecified COPD type ([address]     2. Type 2 diabetes mellitus without complication, without long-term current use of insulin ([address]     3. Essential hypertension     4. Congestive heart failure, unspecified congestive heart failure chronicity, unspecified congestive heart failure type ([address]               Plan:      Return in about 3 months (around 10/10/2018) for COPD, Diabetes, Hypertension. No orders of the defined types were placed in this encounter.     No orders of the defined types were placed in this encounter. Patient given educational materials - see patient instructions. Discussed use, benefit, and side effects of prescribed medications. All patient questions answered. Pt voiced understanding. Reviewed health maintenance. Instructed to continue current medications, diet and exercise. Patient agreed with treatment plan. Follow up as directed.      Electronically signed by Peter Cuello MD on 7/10/2018 at 3:33 PM

## 2018-10-12 NOTE — PROGRESS NOTES
ALT (U/L)   Date Value   09/06/2018 18     BUN (mg/dL)   Date Value   09/06/2018 15     BP Readings from Last 3 Encounters:   10/12/18 (!) 140/96   07/10/18 132/80   06/26/18 134/80              Past Medical History:   Diagnosis Date    Anxiety     Arthritis     Asthma     childhood asthma    COPD [facility] obstructive [facility] disease) [facility]     [facility]     GERD (gastroesophageal reflux disease)     Hypertension     Morbid obesity ([facility]     Peripheral edema     Thoracic ascending aortic aneurysm ([facility] 04/23/2015      Past Surgical History:   Procedure Laterality Date    ANTERIOR CRUCIATE LIGAMENT REPAIR Left     ANTERIOR CRUCIATE LIGAMENT REPAIR Left     ARM SURGERY Right     SPLENECTOMY         Family History   Problem Relation Age of Onset    Heart Disease Father        Social History   Substance Use Topics    Smoking status: Former Smoker     Packs/day: 1.00     Years: 5.00     Types: Cigarettes     Quit date: 1/12/2000    Smokeless tobacco: Never Used      Comment: quit smoking 1999.  syant rrt 4/24/2018    Alcohol use Yes      Comment: 2 beers a day but not everyday      Current Outpatient Prescriptions   Medication Sig Dispense Refill    lisinopril (PRINIVIL;ZESTRIL) 5 MG tablet TAKE 1 TABLET BY MOUTH DAILY 90 tablet 3    bumetanide (BUMEX) 2 MG tablet Take 2 mg by mouth daily      mometasone-formoterol (DULERA) 200-5 MCG/ACT inhaler Inhale 2 puffs into the lungs 2 times daily 3 Inhaler 3    simvastatin (ZOCOR) 20 MG tablet Take 1 tablet by mouth nightly 90 tablet 3    metoprolol tartrate (LOPRESSOR) 25 MG tablet TAKE 1 TABLET BY MOUTH TWICE DAILY 180 tablet 3    metFORMIN (GLUCOPHAGE) 500 MG tablet TAKE 1 TABLET BY MOUTH TWICE DAILY WITH MEALS 180 tablet 3    PROAIR  (90 Base) MCG/ACT inhaler INHALE 2 PUFFS BY MOUTH INTO THE LUNGS EVERY 6 HOURS AS NEEDED FOR WHEEZING 8.5 g 3    sertraline (ZOLOFT) 25 MG tablet Take 1 tablet by mouth daily 90 tablet 3    Handicap Placard MISC by Does not apply route Expires: 3/2/2020 1 each 0    pantoprazole (PROTONIX) 40 MG tablet TAKE 1 TABLET BY MOUTH DAILY 90 tablet 3    OXYGEN Inhale into the lungs 2 LPM/ NC cont      CPAP Machine MISC by Does not apply route With O2 at 4 LPM. Pressure of 13.  fluticasone (FLONASE) 50 MCG/ACT nasal spray 1 spray by Nasal route daily as needed       aspirin EC 81 MG EC tablet Take 1 tablet by mouth daily 30 tablet 3     No current facility-administered medications for this visit. Allergies   Allergen Reactions    Bee Venom Swelling    Other Hives     Mushrooms       Health Maintenance   Topic Date Due    Diabetic retinal exam  02/02/2017    Diabetic foot exam  03/31/2018    DTaP/Tdap/Td vaccine (1 - Tdap) 03/02/2019 (Originally 2/3/1992)    Flu vaccine (1) 03/02/2019 (Originally 9/1/2018)    Pneumococcal highest risk (1 of 3 - PCV13) 04/04/2019 (Originally 2/3/1992)    Diabetic microalbuminuria test  10/27/2018    A1C test (Diabetic or Prediabetic)  09/06/2019    Lipid screen  09/06/2019    Potassium monitoring  09/06/2019    Creatinine monitoring  09/06/2019    HIV screen  Completed       Subjective:      Review of Systems   Constitutional: Negative for chills and fever. HENT: Negative for hearing loss. Eyes: Negative for pain and visual disturbance. Respiratory: Negative for cough and shortness of breath. Cardiovascular: Negative for chest pain, palpitations and leg swelling. Gastrointestinal: Negative for abdominal pain, blood in stool, constipation, diarrhea, nausea and vomiting. Endocrine: Negative for cold intolerance, polydipsia and polyuria. Genitourinary: Negative for difficulty urinating, dysuria and hematuria. Musculoskeletal: Negative for arthralgias, back pain, gait problem and neck pain. Skin: Negative for pallor and rash. Neurological: Negative for dizziness, weakness, numbness and headaches.    Hematological: Negative for

## 2018-10-12 NOTE — PATIENT INSTRUCTIONS
doctor gives you for getting a smart start. Where can you learn more? Go to [email or web address] and sign in to your [id number] account. Enter X331 in the [employer] box to learn more about \"Learning About Physical Activity. \"     If you do not have an account, please click on the \"Sign Up Now\" link. Current as of: December 7, 2017  Content Version: 11.7  © 0212-2715 [employer]. Care instructions adapted under license by [facility] ([facility]). If you have questions about a medical condition or this instruction, always ask your healthcare professional. [address] any warranty or liability for your use of this information.

## 2018-11-29 NOTE — PROGRESS NOTES
route Expires: 3/2/2020 3/2/18  Yes Glenna Herrera MD   pantoprazole (PROTONIX) 40 MG tablet TAKE 1 TABLET BY MOUTH DAILY 11/20/17  Yes Glenna Herrera MD   OXYGEN Inhale into the lungs 2 LPM/ NC cont   Yes Historical Provider, MD   CPAP Machine MISC by Does not apply route With O2 at 4 LPM. Pressure of 13. Yes Historical Provider, MD   fluticasone (FLONASE) 50 MCG/ACT nasal spray 1 spray by Nasal route daily as needed  7/15/15  Yes Historical Provider, MD   aspirin EC 81 MG EC tablet Take 1 tablet by mouth daily 4/25/15  Yes Gali Brown MD       Allergies:  Bee venom and Other    Social History:   reports that he quit smoking about 18 years ago. His smoking use included Cigarettes. He has a 5.00 pack-year smoking history. He has never used smokeless tobacco. He reports that he drinks alcohol. He reports that he does not use drugs. REVIEW OF SYSTEMS:  CONSTITUTIONAL:NEGATIVE  HEENT:NEG  Cardiovascular: No chest pain, Yes dyspnea on exertion, No palpitations. Lower extremity edema: Yes  RESPIRATORY: CARD  GASTROINTESTINAL:  negative  GENITOURINARY:  negative  INTEGUMENT:  negative  MUSCULOSKELETAL:  positive for  pain  NEUROLOGICAL:  negative    PHYSICAL EXAM:      BP (!) 148/80 (Cuff Size: Large Adult)   Ht 6' (1.829 m)   Wt (!) 361 lb (163.7 kg)   BMI 48.96 kg/m²    HEENT: PERRL, no cervical lymphadenopathy. No masses palpable. Cardiovascular:  · The apical impulse is not displaced  · Heart  Sounds:DISTANT S1/S2, NO S3/RUB  · Jugular venous pulsation Normal  · The carotid upstroke is NL  · Peripheral pulses are symmetrical and full  Respiratory: Good respiratory effort.  On auscultation: clear to auscultation bilaterally  Abdomen:  · No masses or tenderness  · Bowel sounds present  Extremities:  ·  No Cyanosis or Clubbing  ·  Lower extremity edema: Yes  Skin: Warm and dry    Cardiac data:    EKG: Sinus  Rhythm with PACs      Echo 10/2107:  Technically difficult study- patient was supine and on a ventilator. Left ventricular enlargement with low normal systolic function; Estimated  left ventricular ejection fraction 50% range. Mild Left ventricular hypertrophy. Right atrial dilatation. Right ventricular dilatation with borderline reduced systolic function. Aortic valve appears bicuspid with a raphe and fusion of RCC and NCC. No significant stenosis or regurgitation is seen. Mild tricuspid regurgitation. Sinus of [place] measures at 4.0 cm. Severely dilated ascending aorta that measures greatest at 5.1 cm. CTA 6/2018-  Stable 4.8 cm aneurysm of Asc Thoracic Aorta- no dissection    Labs:     CBC: No results for input(s): WBC, HGB, HCT, PLT in the last 72 hours. BMP: No results for input(s): NA, K, CO2, BUN, CREATININE, LABGLOM, GLUCOSE in the last 72 hours. PT/INR: No results for input(s): PROTIME, INR in the last 72 hours. FASTING LIPID PANEL:  Lab Results   Component Value Date    HDL 45 09/06/2018    TRIG 160 09/06/2018     LIVER PROFILE:No results for input(s): AST, ALT, LABALBU in the last 72 hours. IMPRESSION:    HFpEF- not in exacerbation  Core pulmonale with LE edema. Morbid obesity with COLLIN and non-compliance with CPAP  HTN- not well controlled  HLP  Ascending aortic aneurysm monitored by cardiothoracic surgery- stable at 4.8 cm on 6/2018  Bicuspid aortic valve. DM  COPD  Patient Active Problem List   Diagnosis    Ascending aortic aneurysm ([address]    Foreign body (FB) in soft tissue    Hypertension    Asthma    GERD (gastroesophageal reflux disease)    Anxiety    S/P splenectomy    COPD (chronic obstructive pulmonary disease) (HCC)    Diabetes mellitus (HCC)    Peripheral edema    Severe obstructive sleep apnea AHI-91.6 - CPAP 13 CWP (C-FLEX-3) 4 L. MIN O2 BLENDED     Acute on chronic respiratory failure with hypoxia and hypercapnia (HCC)    Mixed restrictive and obstructive lung disease (HCC)    Morbid obesity with BMI of 45.0-49.9, adult (HCC)    CHF (congestive

## 2019-01-01 ENCOUNTER — TELEPHONE (OUTPATIENT)
Dept: CARDIOTHORACIC SURGERY | Age: 44
End: 2019-01-01

## 2019-01-01 ENCOUNTER — HOSPITAL ENCOUNTER (OUTPATIENT)
Dept: INTERVENTIONAL RADIOLOGY/VASCULAR | Age: 44
Discharge: HOME | End: 2019-01-16
Payer: COMMERCIAL

## 2019-01-01 ENCOUNTER — TELEPHONE (OUTPATIENT)
Dept: CARDIOLOGY | Age: 44
End: 2019-01-01

## 2019-01-01 ENCOUNTER — HOSPITAL ENCOUNTER (OUTPATIENT)
Dept: LAB | Age: 44
Discharge: HOME | End: 2019-01-14
Payer: COMMERCIAL

## 2019-01-01 ENCOUNTER — OFFICE VISIT (OUTPATIENT)
Dept: INTERNAL MEDICINE | Age: 44
End: 2019-01-01
Payer: COMMERCIAL

## 2019-01-01 ENCOUNTER — OFFICE VISIT (OUTPATIENT)
Dept: PULMONOLOGY | Age: 44
End: 2019-01-01
Payer: COMMERCIAL

## 2019-01-01 VITALS
HEIGHT: 72 IN | SYSTOLIC BLOOD PRESSURE: 128 MMHG | BODY MASS INDEX: 42.66 KG/M2 | WEIGHT: 315 LBS | DIASTOLIC BLOOD PRESSURE: 90 MMHG | HEART RATE: 88 BPM | RESPIRATION RATE: 20 BRPM

## 2019-01-01 VITALS
HEIGHT: 72 IN | SYSTOLIC BLOOD PRESSURE: 128 MMHG | HEART RATE: 97 BPM | WEIGHT: 315 LBS | OXYGEN SATURATION: 92 % | DIASTOLIC BLOOD PRESSURE: 82 MMHG | RESPIRATION RATE: 16 BRPM | BODY MASS INDEX: 42.66 KG/M2

## 2019-01-01 DIAGNOSIS — J44.9 CHRONIC OBSTRUCTIVE PULMONARY DISEASE, UNSPECIFIED COPD TYPE (HCC): Primary | ICD-10-CM

## 2019-01-01 DIAGNOSIS — J98.4 RESTRICTIVE LUNG DISEASE SECONDARY TO OBESITY: ICD-10-CM

## 2019-01-01 DIAGNOSIS — J44.9 CHRONIC OBSTRUCTIVE PULMONARY DISEASE, UNSPECIFIED COPD TYPE (HCC): ICD-10-CM

## 2019-01-01 DIAGNOSIS — I77.810 DILATED AORTIC ROOT (HCC): ICD-10-CM

## 2019-01-01 DIAGNOSIS — I10 ESSENTIAL HYPERTENSION: ICD-10-CM

## 2019-01-01 DIAGNOSIS — E66.01 MORBIDLY OBESE (HCC): ICD-10-CM

## 2019-01-01 DIAGNOSIS — B36.9 FUNGAL SKIN INFECTION: ICD-10-CM

## 2019-01-01 DIAGNOSIS — M79.89 LEFT LEG SWELLING: ICD-10-CM

## 2019-01-01 DIAGNOSIS — G47.33 OSA ON CPAP: Primary | ICD-10-CM

## 2019-01-01 DIAGNOSIS — K21.9 GASTROESOPHAGEAL REFLUX DISEASE WITHOUT ESOPHAGITIS: ICD-10-CM

## 2019-01-01 DIAGNOSIS — E11.9 TYPE 2 DIABETES MELLITUS WITHOUT COMPLICATION, WITHOUT LONG-TERM CURRENT USE OF INSULIN (HCC): ICD-10-CM

## 2019-01-01 DIAGNOSIS — Z99.89 OSA ON CPAP: Primary | ICD-10-CM

## 2019-01-01 DIAGNOSIS — M79.89 LEG SWELLING: Primary | ICD-10-CM

## 2019-01-01 DIAGNOSIS — I27.81 CHRONIC COR PULMONALE (HCC): ICD-10-CM

## 2019-01-01 DIAGNOSIS — F32.A DEPRESSION, UNSPECIFIED DEPRESSION TYPE: ICD-10-CM

## 2019-01-01 DIAGNOSIS — E66.9 RESTRICTIVE LUNG DISEASE SECONDARY TO OBESITY: ICD-10-CM

## 2019-01-01 LAB
CREATININE URINE: 333.1 MG/DL (ref 39–259)
ESTIMATED AVERAGE GLUCOSE: 143 MG/DL
HBA1C MFR BLD: 6.6 % (ref 4.8–5.9)
MICROALBUMIN/CREAT 24H UR: 62 MG/L
MICROALBUMIN/CREAT UR-RTO: 19 MCG/MG CREAT

## 2019-01-01 PROCEDURE — 83036 HEMOGLOBIN GLYCOSYLATED A1C: CPT

## 2019-01-01 PROCEDURE — 93971 EXTREMITY STUDY: CPT

## 2019-01-01 PROCEDURE — 82043 UR ALBUMIN QUANTITATIVE: CPT

## 2019-01-01 PROCEDURE — 36415 COLL VENOUS BLD VENIPUNCTURE: CPT

## 2019-01-01 PROCEDURE — 99214 OFFICE O/P EST MOD 30 MIN: CPT | Performed by: INTERNAL MEDICINE

## 2019-01-01 PROCEDURE — 99213 OFFICE O/P EST LOW 20 MIN: CPT | Performed by: NURSE PRACTITIONER

## 2019-01-01 PROCEDURE — 82570 ASSAY OF URINE CREATININE: CPT

## 2019-01-01 RX ORDER — FLUTICASONE PROPIONATE 50 MCG
1 SPRAY, SUSPENSION (ML) NASAL DAILY PRN
Qty: 3 BOTTLE | Refills: 3 | Status: SHIPPED | OUTPATIENT
Start: 2019-01-01

## 2019-01-01 RX ORDER — ALBUTEROL SULFATE 90 UG/1
AEROSOL, METERED RESPIRATORY (INHALATION)
Qty: 8.5 G | Refills: 5 | Status: SHIPPED | OUTPATIENT
Start: 2019-01-01

## 2019-01-01 RX ORDER — CARVEDILOL 6.25 MG/1
6.25 TABLET ORAL 2 TIMES DAILY WITH MEALS
Qty: 60 TABLET | Refills: 3 | Status: SHIPPED | OUTPATIENT
Start: 2019-01-01

## 2019-01-01 RX ORDER — SIMVASTATIN 20 MG
20 TABLET ORAL NIGHTLY
Qty: 90 TABLET | Refills: 3 | Status: SHIPPED | OUTPATIENT
Start: 2019-01-01

## 2019-01-01 RX ORDER — LISINOPRIL 5 MG/1
TABLET ORAL
Qty: 90 TABLET | Refills: 3 | Status: SHIPPED | OUTPATIENT
Start: 2019-01-01

## 2019-01-01 RX ORDER — ALBUTEROL SULFATE 90 UG/1
AEROSOL, METERED RESPIRATORY (INHALATION)
Qty: 8.5 G | Refills: 3 | Status: SHIPPED | OUTPATIENT
Start: 2019-01-01 | End: 2019-01-01 | Stop reason: SDUPTHER

## 2019-01-01 RX ORDER — PANTOPRAZOLE SODIUM 40 MG/1
TABLET, DELAYED RELEASE ORAL
Qty: 90 TABLET | Refills: 3 | Status: SHIPPED | OUTPATIENT
Start: 2019-01-01

## 2019-01-01 RX ORDER — BUMETANIDE 2 MG/1
2 TABLET ORAL DAILY
Qty: 90 TABLET | Refills: 3 | Status: SHIPPED | OUTPATIENT
Start: 2019-01-01

## 2019-01-01 RX ORDER — CLOTRIMAZOLE AND BETAMETHASONE DIPROPIONATE 10; .64 MG/G; MG/G
CREAM TOPICAL
Qty: 1 G | Refills: 3 | Status: SHIPPED | OUTPATIENT
Start: 2019-01-01

## 2019-01-01 ASSESSMENT — ENCOUNTER SYMPTOMS
VOMITING: 0
DIARRHEA: 0
BLOOD IN STOOL: 0
NAUSEA: 0
BACK PAIN: 0
EYE PAIN: 0
SHORTNESS OF BREATH: 0
ABDOMINAL PAIN: 0
COUGH: 0
CONSTIPATION: 0

## 2019-01-01 ASSESSMENT — COPD QUESTIONNAIRES: COPD: 1

## 2019-01-01 ASSESSMENT — PATIENT HEALTH QUESTIONNAIRE - PHQ9
1. LITTLE INTEREST OR PLEASURE IN DOING THINGS: 1
2. FEELING DOWN, DEPRESSED OR HOPELESS: 1
SUM OF ALL RESPONSES TO PHQ QUESTIONS 1-9: 2
SUM OF ALL RESPONSES TO PHQ QUESTIONS 1-9: 2
SUM OF ALL RESPONSES TO PHQ9 QUESTIONS 1 & 2: 2

## 2019-04-08 DIAGNOSIS — I10 ESSENTIAL HYPERTENSION: ICD-10-CM

## 2019-04-08 DIAGNOSIS — I71.21 ASCENDING AORTIC ANEURYSM (HCC): ICD-10-CM

## 2019-04-08 DIAGNOSIS — R07.9 CHEST PAIN, UNSPECIFIED TYPE: ICD-10-CM

## 2019-04-08 DIAGNOSIS — M79.89 LEG SWELLING: ICD-10-CM

## 2019-04-08 DIAGNOSIS — Q23.1 BICUSPID AORTIC VALVE: ICD-10-CM

## 2019-06-18 ENCOUNTER — TELEPHONE (OUTPATIENT)
Dept: CARDIOLOGY | Age: 44
End: 2019-06-18

## 2019-06-18 NOTE — TELEPHONE ENCOUNTER
Pt father called asking questions about the pt dx and cause of death. States he has insurance papers he needs to fill out and would like to talk to someone about it.   Please call to advise @ [phone]

## 2019-06-20 NOTE — TELEPHONE ENCOUNTER
Have them talk to pcp. We did not receive any info on death.  Usually death certificates done by pcp

## 2024-07-17 NOTE — PROGRESS NOTES
[facility] exacerb[facility] ([facility] [address] [J44.1] . Vitals:    10/04/17 1030   BP:    Pulse: 99   Resp: 18   Temp:    SpO2: 95%   . Patients home meds are   Prior to Admission medications    Medication Sig Start Date End Date Taking? Authorizing Provider   lisinopril (PRINIVIL;ZESTRIL) 5 MG tablet TAKE 1 TABLET BY MOUTH DAILY 8/7/17   Alex Ramon MD   CPAP Machine MISC by Does not apply route With O2 at 4 LPM. Pressure of 13.     Historical Provider, MD   sertraline (ZOLOFT) 25 MG tablet Take 25 mg by mouth daily    Historical Provider, MD   mometasone-formoterol (DULERA) 200-5 MCG/ACT inhaler Inhale 2 puffs into the lungs 2 times daily 5/31/17   Leander Mcbride MD   simvastatin (ZOCOR) 20 MG tablet Take 1 tablet by mouth nightly 5/18/17   Alex Ramon MD   metoprolol tartrate (LOPRESSOR) 25 MG tablet TAKE 1 TABLET BY MOUTH TWICE DAILY 11/30/16   Alex Ramon MD   PROAIR  (90 BASE) MCG/ACT inhaler INHALE 2 PUFFS BY MOUTH INTO LUNGS EVERY 6 HOURS AS NEEDED FOR WHEEZING 8/31/16   Alex Ramon MD   pantoprazole (PROTONIX) 40 MG tablet TAKE 1 TABLET BY MOUTH DAILY 8/31/16   Alex Ramon MD   fluticasone [facility]) 50 MCG/ACT nasal spray 1 spray by Nasal route daily as needed  7/15/15   Historical Provider, MD   metFORMIN (GLUCOPHAGE) 500 MG tablet TAKE 1 TABLET BY MOUTH TWICE DAILY WITH MEALS 7/21/15   Alex Ramon MD   aspirin EC 81 MG EC tablet Take 1 tablet by mouth daily 4/25/15   Sonya Aguliar MD   .  Recent Surgical History: None = 0     Assessment     Peak Flow (asthma only)    Predicted: na  Personal Best: na  PEF na  % Predicted na  Peak Flow : not applicable = 0    OQH6/TGN    FEV1 Predicted na      FEV1 na    FEV1 % Predicted na  FVC na  IS volume na  IBW na    RR 22  Breath Sounds: diminished, sl increase wob      · Bronchodilator assessment at level  3  · Hyperinflation assessment at level   · Secretion Management assessment at level ·   · []    Bronchodilator Assessment  BRONCHODILATOR ASSESSMENT SCORE  Score 0 1 2 3 4 5   Breath Sounds   []  Patient Baseline []  No Wheeze good aeration []  Faint, scattered wheezing, good aeration [x]  Expiratory Wheezing and or moderately diminished []  Insp/Exp wheeze and/or very diminished []  Insp/Exp and/ or marked distress   Respiratory Rate   []  Patient Baseline []  Less than 20 []  Less than 20 [x]  20-25 []  Greater than 25 []  Greater than 25   Peak flow % of Pred or PB [x]  NA   []  Greater than 90%  []  81-90% []  71-80% []  Less than or equal to 70%  or unable to perform []  Unable due to Respiratory Distress   Dyspnea re []  Patient Baseline []  No SOB []  No SOB [x]  SOB on exertion []  SOB min activity []  At rest/acute   e FEV% Predicted       [x]  NA []  Above 69%  []  Unable []  Above 60-69%  []  Unable []  Above 50-59%  []  Unable []  Above 35-49%  []  Unable []  Less than 35%  []  Unable            Pt just extubated   []  Hyperinflation Assessment  Score 1 2 3   CXR and Breath Sounds   []  Clear []  No atelectasis  Basilar aeration []  Atelectasis or absent basilar breath sounds   Incentive Spirometry Volume  (Per IBW)   []  Greater than or equal to 15ml/Kg []  less than 15ml/Kg []  less than 15ml/Kg   Surgery within last 2 weeks []  None or general   []  Abdominal or thoracic surgery  []  Abdominal or thoracic   Chronic Pulmonary Historyre []  No []  Yes []  Yes     []  Secretion Management Assessment  Score 1 2 3   Bilateral Breath Sounds   []  Occasional Rhonchi []  Scattered Rhonchi []  Course Rhonchi and/or poor aeration   Sputum    []  Small amount of thin secretions []  Moderate amount of viscous secretions []  Copius, Viscious Yellow/ Secretions   CXR as reported by physician []  clear  []  Unavailable []  Infiltrates and/or consolidation  []  Unavailable []  Mucus Plugging and or lobar consolidation  []  Unavailable   Cough []  Strong, productive cough []  Weak productive cough []  No cough or weak non-productive cough   ARAM PRO  11:32 AM                            FEMALE                                  MALE                            FEV1 Predicted Normal Values                        FEV1 Predicted Normal Values          Age                                     Height in Feet and Inches       Age                                     Height in Feet and Inches       4' 11\" 5' 1\" 5' 3\" 5' 5\" 5' 7\" 5' 9\" 5' 11\" 6' 1\"  4' 11\" 5' 1\" 5' 3\" 5' 5\" 5' 7\" 5' 9\" 5' 11\" 6' 1\"   42 - 45 2.49 2.66 2.84 3.03 3.22 3.42 3.62 3.83 42 - 45 2.82 3.03 3.26 3.49 3.72 3.96 4.22 4.47   46 - 49 2.40 2.57 2.76 2.94 3.14 3.33 3.54 3.75 46 - 49 2.70 2.92 3.14 3.37 3.61 3.85 4.10 4.36   50 - 53 2.31 2.48 2.66 2.85 3.04 3.24 3.45 3.66 50 - 53 2.58 2.80 3.02 3.25 3.49 3.73 3.98 4.24   54 - 57 2.21 2.38 2.57 2.75 2.95 3.14 3.35 3.56 54 - 57 2.46 2.67 2.89 3.12 3.36 3.60 3.85 4.11   58 - 61 2.10 2.28 2.46 2.65 2.84 3.04 3.24 3.45 58 - 61 2.32 2.54 2.76 2.99 3.23 3.47 3.72 3.98   62 - 65 1.99 2.17 2.35 2.54 2.73 2.93 3.13 3.34 62 - 65 2.19 2.40 2.62 2.85 3.09 3.33 3.58 3.84   66 - 69 1.88 2.05 2.23 2.42 2.61 2.81 3.02 3.23 66 - 69 2.04 2.26 2.48 2.71 2.95 3.19 3.44 3.70   70+ 1.82 1.99 2.17 2.36 2.55 2.75 2.95 3.16 70+ 1.97 2.19 2.41 2.64 2.87 3.12 3.37 3.62             Predicted Peak Expiratory Flow Rate                                       Height (in)  Female       Height (in) Male           Age 58 63 61 63 56 77 78 74 Age            20 204 453 281 735 666 060 409 046  38 68 35 20 68 70 72 74 76   25 337 352 366 381 396 411 426 441 25 447 476 505 533 562 591 619 648 677   30 329 344 359 374 389 404 419 434 30 437 466 494 523 552 580 609 638 667   35 322 337 351 366 381 396 411 426 35 426 455 484 512 541 570 598 627 657   40 314 329 344 359 374 389 404 419 40 416 445 473 502 531 559 588 617 647   45 307 322 336 351 366 381 396 411 45 405 434 463 491 520 549 577 606 636   50 299 314 329 344 359 374 389 404 50 395 810 098 069 626 229 910 290 677   97 757 625 693 358 993 695 572 333 37 497 280 467 190 872 119 139 963 434   38 057 096 353 305 776 880 826 636 24 233 680 929 895 616 543 263 069 510   66 148 510 306 321 336 351 366 381 65 363 392 421 449 478 507 535 564 594   70 269 284 299 314 329 344 359 374 70 353 382 410 439 468 496 525 554 583   75 261 274 289 305 319 334 348 364 75 344 372 400 429 458 487 515 544 573   80 253 266 282 296 312 327 342 356 80 335 362 390 419 448 476 505 534 562 poor balance